# Patient Record
Sex: FEMALE | Race: WHITE
[De-identification: names, ages, dates, MRNs, and addresses within clinical notes are randomized per-mention and may not be internally consistent; named-entity substitution may affect disease eponyms.]

---

## 2017-07-13 NOTE — NUR
CALLED DR. GARLAND AND NOTIFIED HIM OF FENTANYL PATCH ON PT, ALSO CALLED FOR
CLARIFICATION OF 2 MEDS, NEW ORDERS RECEIVED AND TO BE CARRIED OUT.

## 2017-07-13 NOTE — NUR
PT.ASSESSED. PT.AWAKES TO ANSWER QUESTIONS AND IS LOCX3 TO ANSWERS, BUT EYES
IMMEDIATELY CLOSE AND SHE IS ASLEEP.  I FLUSHED,WASTED 10CC AND
LEONOR BLOOD FOR LAB FROM PICC LINE AND FLUSHED AGAIN BOTH LUMEN'S AND PT.WAS
AWAKE AND TALKING TO ME AND FELL ASLEEP DURING AND DIDN'T KNOW I DID IT. B/P
IS 88/46, , 90%02 ON 2L, 98.6T.,R22, CO2 57.8/GENIE OF RESPIRATORY
NOTIFIED  IS HERE AND AWARE OF V/S.   IS ORDERING O2@3L
HUMIDIFIED.

## 2017-07-14 NOTE — NUR
PT ALERT AND ORIENTED X3; SITTING ON SIDE OF BED; DRINKING COFFEE, WATCHING
TV, TALKACTIVE, DENIES PAIN AT THIS TIME; NS INFUSING AT 80 ML/HR WITHOUT
DIFFICULTY, NO SIGNS OF DISTRESS NOTED, SAFETY MEASURES, EXPLAINED PLAN OF
CARE AND MED SCHEDULE, VOICES UNDERSTANDING ENCOURAGED TO CALL IF NEEDED. WILL
CONTINUE TO MONITOR. PM ASSESSMENT COMPLETED AT THIS TIME SEE INTERVENTIONS
FOR DETAILS. CALL KLINE IS AT REACH.

## 2017-07-14 NOTE — NUR
PT JUST COMPLETED A SHOWER. IV SITE FLUSHABLE., NO DISTRESS NOTED. C/O LOOSE
STOOLS. ASSESSMENT IS COMPLETE.

## 2017-07-14 NOTE — NUR
PT. C/O HA 5/10, MEDICATED WITH ORDERED PRN MOTRIN, WILL REASSESS. DENIES
FURTHER NEED. CALL LIGHT IS IN REACH.

## 2017-07-14 NOTE — NUR
PT. RESTING IN BED WITH EYES CLOSED, NO DISTRESS NOTED. RESP EVEN AND
UNLABORED. CALL LIGHT IS IN REACH.

## 2017-07-15 NOTE — NUR
PT APPEARS TO BE SLEEPING WITH EYES CLOSED; AROUSES TO MINIMUM STIMULI; RESP
ARE EVEN AND UNLABORED; VOICES NO COMPLAINTS AT THIS TIME; NS INFUSING AT 80
ML/HR; DOUBLE LUMEN PICC LINE ON LUE IS FREE OF REDNESS OR EDEMA; NO DISTRESS
NOTED; WILL CONTINUE TO MONITOR.

## 2017-07-15 NOTE — NUR
PT RETURNED FROM RADIOLOGY VIA WC. PT UP TO RECLINER CHAIR WITH MINIMAL
ASSISTANCE. WILL CONTINUE TO MONITOR. CALL LIGHT IN REACH.

## 2017-07-15 NOTE — NUR
PT C/O HEADACHE, MEDICATED WITH MOTRIN PER EMAR, WILL CONTINUE TO REASSESS,
CALL BELL IS AT REACH, NO DISTRESS NOTED.

## 2017-07-15 NOTE — NUR
RECEIVED SHIFT REPORT FROM JONG DAILEY. PATIENT LAYING IN BED QUIETLY AND APPEARS
NOT TO BE IN ANY APPARENT ACUTE DISTRESS. NO VOICED COMPLAINTS AT THIS TIME.

## 2017-07-15 NOTE — NUR
REPORT RECIEVED FROM JONG CELAYA. PT SITTING UP IN BED. ASSESSMENT COMPLETED.
VSS. POC DISCUSSED WITH PT. IVF INFUSING AT PRESCRIBED RATE. WILL CONTINUE TO
MONITOR. CALL LIGHT IN REACH.

## 2017-07-16 NOTE — NUR
PATIENT LAYING IN BED WITH EYES CLOSED AND APPEARS TO BE ASLEEP. RESPIRATION
EVEN AND UNLABORED. NO APPARENT ACUTE DISTRESS NOTED.

## 2017-07-16 NOTE — NUR
RECEIVED SHIFT REPORT FROM JONG DAILEY. PATIENT LAYING IN BED AND APPEARS NOT TO
BE IN ANY APPARENT ACUTE DISTRESS OR DISCOMFORT. NO VOICED COMPLAINTS.

## 2017-07-16 NOTE — NUR
DR. GARLAND IN TO SEE PT AT THIS TIME. PLAN OF CARE UPDATED. PT INSTRUCTED ON
CPT, AMBULATING IN HALLWAYS AND REPEAT LABS. PT STATES UNDERSTANDING.

## 2017-07-17 NOTE — NUR
2ND UNIT COMPLETED: IV SITE IS FREE FROM REDNESS OR EDEMA. FAMILY HAS BEEN IN
TO VISIT WITH PT. NO DISTRESS NOTED. CONTINUE TO OSBERVE AND MONITOR.

## 2017-07-17 NOTE — NUR
PATIENT RESTING IN BED AT THIS TIME. C/O GENERALIZED ITCHING AND WAS MEDICATED
WITH BENEDRYL 25MG PO.  PATIENT C/O LEFT KNEE PAIN-10/10 AND WAS MEDICATED
WITH MOTRIN 800MG PO FOR PAIN.  PATIENT MEDICATED FOR ANXIETY AND SLEEP WITH
XANAX 1MG PO AS ORDERED.  SAFETY PRECAUTIONS REINFORCED. CALL LIGHT IN REACH.
WILL CONT TO MONITOR.

## 2017-07-17 NOTE — NUR
PT HELEN TO SIT UP IN THE CHAIR NO DISTRESS NOTED. IV SITE IS FREE FROM REDNESS
OR EDEMA. CONTINUE TO OSBERVE AND MONITOR.

## 2017-07-17 NOTE — NUR
ASSESSMENT IS COMPLETED: IV SITE IS FREE FROM REDNESS OR EDEMA. NEEDING
HEPARIN TO KEEP LINE CLEAR. NO DISTRESS NOTED. CONTINUE TO OBSERVE AND MONTOR.

## 2017-07-17 NOTE — NUR
PATIENT SITTING UP IN THE RECLINER WITH VISITORS AT BESIDE. PATIENT ALERT AND
ORIENTEDX3.  PATIENT WITH DOUBLE LUMEN PICC TO LEFT ARM WITH NS AT KVO RATE.
SITE APPEARS HEALTHY AT THIS TIME.  PATIENT WITH NO O2 AT THIS TIME.  SAFETY
PRECAUTIONS REVIEWED WITH PATIENT. CALL LIGHT IN REACH. WILL CONT TO MONITOR.

## 2017-07-18 NOTE — NUR
DISCHARGE INSTRUCTIONS GIVEN AND PT VERBALIZED UNDERSTANDING. IV SITE FLUSHED
WITH NS AND HEPARIN. CONTINUE TO OSBERVE AND MONITOR.

## 2017-07-18 NOTE — NUR
ASSESSMENT IS COMPLETED : IV SITE IS FREE FROM REDNESS OR EDEMA. ACCU CHECK
PER LAB WAS 64 AND THEN RECHECKED IT WAS 93. CONTINUE TO OSBERVE AND MONITOR.

## 2017-07-18 NOTE — NUR
PATIENT APPEARS SLEEPING AT THIS TIME WITH EYES CLOSED. IVF PATENT AND
INFUSING AT 20CC/HR.  CALL LIGHT IN REACH. WILL CONT TO MONITOR.

## 2017-08-18 NOTE — NUR
OOB TO BSC VOIDING CLEAR YELLOW URINE, THEN BACK TO BED.  ICE PACK APPLIED TO
LEFT KNEE.  CALL LIGHT IN REACH.

## 2017-08-18 NOTE — NUR
BED SIDE REPORT GIVEN BY RICHI FROM PACU.
PT ARRIVED ON UNIT IN BED, ALERT AND ORIENTED X 3, C/O PAIN TO L. KNEE @
12/10, O2 SATS IN LOW 80'S. O2 @ 2L VIA NC PLACED, SETTLED IN ROOM, SURGICAL
DRESSING TO L. KNEE CDI. CALL KLINE IN REACH.

## 2017-08-18 NOTE — NUR
P/O LEFT KNEE ARTHROPLASTY, OOB TO BSC WITH MINIMAL ASSISTANE VOIDING CLEAR
YELLOW URINE, THEN BACK TO BED.  SCD APPLIED TO RIGHT LEG.  C/O PAIN 10/10,
MEDICATED WITH PERCOCET 2TAB AT THIS TIME.  LR INFUSING TO LFA AT 100CC/HR.
ICE PACK TO LEFT KNEE.  ENCOURAGED TO USE CALL LIGHT FOR ASSISTNACE, WILL
CONTINUE TO MONITOR.

## 2017-08-19 NOTE — NUR
PT.MEDICATED FOR PAIN REPORTED 10/10, PT.IS UPRIGHT IN BED STARTING TO EAT
SUPPER TRAY AT THIS TIME; CALL LIGHT W/IN REACH

## 2017-08-19 NOTE — NUR
PATIENT RESTING IN BED AT THIS TIME.  PATIENT C/O SEVERE POST-OP PAIN-10/10.
PATIENT MEDICATED WITH DILAUDID 1MG IVP AS ORDERED FOR SEVERE PAIN.  PATIENT
IS ALERT AND ORIENTEDX3.  PATIENT UP TO THE BSC VOIDING IN SMALL AMTS.
PATIENT WITH DOUBLE LUMEN PICC TO LEFT UPPER ARM.  SITE APPEARS HEALTHY AT
THIS TIME.  GOOD BLOOD RETURN FROM BOTH PORTS.  IVF TO LEFT WRIST AREA AT
100CC/HR.  SITE APPEARS HEALTHY AT THIS TIME.  DRESSING TO LEFT KNEE INTACT
WITH ACE WRAP.  ICE PACK FOR COMFORT.  SAFETY PRECAUTIONS REINFORCED. CALL
LIGHT IN REACH. WILL CONT TO MONITOR.

## 2017-08-19 NOTE — NUR
PT.ASSISTED TO BSC,AMBULATED WELL USING WALKER AND PIVOT METHOD; PT.ASSISTED
BACK TO BED AND REPOSITIONED W/ICE PACKS IN PLACE; PROVIDED GINGER-HELEN AS
REQUESTED OVE ICE

## 2017-08-19 NOTE — NUR
OOB TO BSC WITH MINIMAL ASSISTANCE, VOIDING CLEAR YELLOW URINE.  BACK TO BED.
QUARTER SIZE BLOOD COMMING THROUGH DRESSING.  ICE PACK APPLIED.  WILL CONTINUE
TO MONITOR.

## 2017-08-19 NOTE — NUR
RADIOLOGY AT BEDSIDE FOR CXR TO CONFIRM PICC LINE PLACEMENT. WILL AWAIT FOR
RESULTS. #20 LFA AT THIS TIME REMAINS IN PLACE. CALL LIGHT IN REACH.

## 2017-08-19 NOTE — NUR
Patient was seen and treated today. Patient expressed that the left knee knee
is sore and painful 8/10. Patient also feels sleepy prior to start of
physical therapy session.
 
Physical Therapy Management:
1. Ankle pumps x 15 repetitions
2. Passive stretching of the left knee into full extension  x 5 minutes
3. Bilateral quad-setting x 5 seconds hold x 10 repetitions
4. Bilateral hamstring-setting x 5 seconds hold x 10 repetitions
5. Active range of motion of the right knee
   5.1 Right hip and knee flexion x 15 repetitions
6. Active-assistive range of motion of the left knee
   6.1 Left hip and knee flexion x 15 repetitions
 
Patient education on fall precautions discussed. Patient verbalized
understanding.
 
Patient was able to perform all exercises but was sore throughout physical
therapy session. Patient remained sleepy throughout physical therapy.

## 2017-08-19 NOTE — NUR
PATIENT WITH HS MEDS INCLUDING PERCOCET 10/325MG TABS 2 AND XANAX 1MG PO FOR
SLEEP.  RECIEVED VERBAL COMFIRMATION THAT PICC IS IN THE SVC-IVF HOOKED UP TO
THE PICC LINE AT 100CC/HR AS ORDERED.  IV SITE TO LEFT WRIST D/CARLITOS.  O2 VIA
NASAL CANNULA IN PLACE AT 2LPM.  CALL LIGHT IN REACH. WILL CONT TO MONITOR.

## 2017-08-19 NOTE — NUR
Patient was seen and treated today. Pateint left knee feels sore prior to
start of physical therapy session.
 
1. Ankle pumps x 15 repetitions
2. Bilateral quad setting x 5 seconds hold x 10 repetitions
3. Bilateral hamstring setting x 5 seconds x 10 repetitions
4. Active-assistve range of motion of the left lower extremity
   4.1 Hip and knee flexion x 15 repetitions
   4.2 Hip abduction x 15 repetitions
5. Active range of motion of the right lower extrmity
   5.1 Hip and knee flexion x 15 repetitions
   5.2 Hip abduction x 15 repetitions
6. Supine to sit and sit to supine with minimal assist and verbal cues for
   safety.
7. Sitting balance at the edge of the bed x 3-5 minutes
8. Sit to stand and stand to sit x 3-5 minutes
9. Bed to chair and chair bed transfer 2 repetitions
10. Gait training with rolling walker with minimal assist and with verbal cues
    for safety x 3-5 feet.
 
James was able to perform all therapeutic exercises but felt very tired after
physical therapy session. Fall precautions reviewed. Patient verbalized
understanding.

## 2017-08-19 NOTE — NUR
PT SITTING UP IN BED. REPORT RECIEVED FROM PATRIC SANDOVAL LPN. ASSESSMENT
COMPLETED. VSS. PT C/O LEFT KNEE PAIN. MEDICATED AT THIS TIME. I.S. AT BEDSIDE
AND PT ABLE TO DEMONSTRATE TO WRITER. POC DISCUSSED. ICE PACK TO LEFT KNEE.
WILL CONTINUE TO MONITOR. CALL LIGHT IN REACH.

## 2017-08-19 NOTE — NUR
PT MEDICATED FOR PAIN AT THIS TIME. FAMILY AT BEDSIDE. PT DENIES ANY NEEDS AT
THIS TIME. WILL CONTINUE TO MONITOR. CALL LIGHT IN REACH.

## 2017-08-20 NOTE — NUR
PATIENT UP IN CHAIR WITH MAX A OF 2 PER NSG.  HER BREAKFAST IS STILL IN FRONT
OF HER, WHILE SHE SLEEPS IN THE CHAIR.  WAITED 25 MIN FOR HER TO EAT AND UPON
RETURN SHE HAD NOT MOVED AND WAS STILL SLEEPING.  INCREASED VERBAL STIM TO
MAINTAIN EYES OPEN.  REC'D FUNCTIONAL ACTIVITY/GAIT TRAINING FOR SIT TO STAND
AND AMB WITH RW.  MOD A OF 2 SIT TO STAND. PATIENT UNABLE TO STAND ERECT. HAD
CNA FOLLOW WITH RECLINER DUE TO POOR SAFETY AWARENESS AND ALERTNESS.
INSTRUCTED TO MAINTAIN FOOT BETWEEN HANDS INSIDE OF WALKER. PATIENT DOES NOT
MOVE RIGHT LE AS NOT TO WB ON LEFT.  SHE FELT CHAIR BEHIND HER AND SAT WITH
ASSISTANCE, ABRUPTLY.  PATIENT WAS NOT ABLE TO FOLLOW DIRECTIONS FOR ADVANCING
R LE, USING UE WB'G, OR EVEN TO SLIDE R FOOT FORWARD.  THE PROPER SEQUENCE AND
TECHNIQUE WAS DEMONSTRATED FOR PATIENT TO PROVIDE LESS PRESSURE ON L LE AND
MAINTAIN GOOD BASE OF SUPPORT IN WALKER.  SHE VERBALIZES GOOD UNDERSTANDING OF
SAME.  SHE REQUESTS HER OATMEAL BE HEATED AND IS ALERT ENOUGH NOW TO EAT. WILL
AMB MORE AT P.M. TX LATER TODAY. AAROM IN SITTING AND PATIENT NOT RECLINED IN
ORDER TO STAY AWAKE AND EAT.

## 2017-08-20 NOTE — NUR
REASSESSED O2 LEVELS @90% ON 02@2L, PT.IS UPRIGHT IN RECLINER W/ICE PACK IN
PLACE POPLATEAL OF KNEE, PT.SLEEPING THROUGH SAT CHECK, BREAKFAST IS IN FRONT
OF HER AND I ALREADY AWOKE HER AND REMINDED HER OF HER BREAKFAST, SHE FELL
BACK TO SLEEP

## 2017-08-20 NOTE — NUR
PATIENT STATES NO RELIEF FROM DILAUDID-STILLL WITH PAIN 10/10 ON  PAIN SCALE.
PATIENT MEDICATED WITH PERCOCET 2TABS AS ORDERED.  PATIENT SEEMS TO BE DOZING
OFF INTERMOITANTLY BUT STILL C/O SEVERE PAIN.  PATIENT WITH TREMORS OF HANDS
WHEN HANDED HER WATER TO TAKE HER PILLS.  PATIENT WITH O2 VIA NASAL CANNULA IN
PLACE.  SCD'S IN PLACE.  ICE PACK TO LEFT KNEE.  DRESSING REMAINS CLEAN DRY
AND INTACT WITH ACE WRAP TO SECURE. SAFETY PRECAUTIONS REINFORCED.  CALL LIGHT
IN REACH. WILL CONT TO MONITOR.

## 2017-08-20 NOTE — NUR
PATIENT APPEARS SLEEPING AT THIS TIME IN NO ACUTE DISTRESS WITH O2 VIA NASAL
CANNULA IN PLACE.  CALL LIGHT IN REACH. WILL CONT TO MONITOR.

## 2017-08-20 NOTE — NUR
PATIENT RESTING IN BED AT THIS TIME.  AWAKE ALERT AND ORIENTEDX3.  PATIENT
WITH DRESSING TO LEFT KNEE INTACT WITH ACE WRAP.  SOME BRUISING NOTEDED BELOW
THE KNEE AND SEVERAL SMALL FLUID FILLED BLISTERS NOTED TO THE LEFT HIP/THIGH
AREA-LEFT NAEL.  PATIENT WITH DOUBLE LUMEN PICC TO LEFT UPPER ARM-SITE APPEARS
HEALTHY AT THIS TIME.  SCD IN USE TO RIGHT LE.  USING ICE TO SURGICAL SITE AS
ORDERED FOR COMFORT AND SWELLING.  ENCOURAGED CDB EXERCISE AND USE OF IS Q1H
WHILE AWAKE X10 REPS-DEMONSTRATES ABILITY TO PREFORM.  SAFETY PRECAUTIONS
REINFORCED. CALL LIGHT IN REACH. WILL CONT TO MONITOR.

## 2017-08-20 NOTE — NUR
PT.UP TO BSC, HR ELEVATED AND O2 SATS DROPPED TO 84%, PT.REPLACED ON O2, UP TO
 PT.PROVIDED GARRETT CARE AND REPOSITIONED IN RECLINER FOR BREAKFAST.HR BACK
DOWN AND O2 SATS AT 91%

## 2017-08-20 NOTE — NUR
PATIENT RESTING IN BED AT THIS TIME-C/O SEVERE POST-OP AND BACK PAIN-MEDICATED
WITH DILAUDID 1MG IVP.  PATIENT IS ASKING FOR PERCOCET AS WELL-EXPLAINED TO
THE PATIENT TO SEE IF THE DILAUDID WORKS FIRST-EDUCATED ON THE POSSIBLE SIDE
EFFECTS OF NARCOTICS.  SAFETY PRECAUTIONS REINFORCED.  CALL LIGHT IN REACH.
WILL CONT TO MONITOR,

## 2017-08-20 NOTE — NUR
PATIENT ASSISTED TO THE BSC TO VOID AND BACK TO BED.  PATIENT C/O POST-OP AND
BACK PAIN-10/10.  MEDICATED WITH DILAUDID 1MG AS ORDERED.  O2 VIA NASAL
CANNULA IN PLACE. CALL LIGHT IN REACH. WILL CONT TO MONITOR.

## 2017-08-20 NOTE — NUR
PATIENT PERFORMED SIT TO STAND TRANSFER AT BEDSIDE X 4 WITH MAX ASSIST FROM
THERAPIST. SHE WAS ABLE TO STAND PIVOT TO BEDSIDE COMMODE POSITIONED
PERPENDICULAR TO THE FOOT OF THE BED WITH MAX ASSIST AND MAX VERBAL CUES. SHE
WAS HAVING DIFFICULTY MOVING HER FEET TO COMPLETE THE STAND PIVOT MANEUVER.
PATIENT SAFELY SAT ON THE COMMODE AND TRANSFERRED BACK TO BED IN SHORT SITTING
POSITION. FINALLY, SHE WALKED SIDEWAYS TOWARDS THE HEAD OF BED IN TINY LITTLE
STEPS WITH MAX ASSIST FROM THERAPIST. SHE HAS GREAT DIFFICULTY STANDING USING
THE ROLLING WALKER TODAY. HER PAIN LEVEL WAS STATED AS 40/1O. SHE WAS UNABLE
TO TRANSFER AND WALK TO THE RECLINER. THERAPIST GAVE MAX ASSIST FOR SIT TO
SUPINE TRANSFER AND PATIENT SAFELY GOT INTO SUPINE POSITION.

## 2017-08-21 NOTE — NUR
Pt seen this pm for gait. She moved supine to and from sit with SBA. Gait with
RW 2 x15' with CGA, gait belt and non skid socks in place. Pt returned to bed
with call bell and phone in reach. Pt required cueing for safety skills.

## 2017-08-21 NOTE — NUR
PATIENT ASSISTED OOB TO BS TO VOID YELLOW URINE AND THE ASSISTED BACK TO BED.
PATIENT MEDICATED WITH PERCOCET 10/325MG PO FOR PAIN WITH RESTORIL 30 MG PO
FOR SLEEP.  PATIENT CONT TO BE UNHAPPY WITH CURRENT PAIN MANAGEMENT PLAN-WANTS
MORE PAIN MEDS.  PATIENT DOESN'T SEEM TO BE IN ANY SEVERE DISTRESS AT THIS
TIME EVEN THOUGH PATIENT STATES THAT HER PAIN LEVEL IS 10/10 ON PAIN SCALE.
ATTEMPT TO EDUCATE PATIENT REGUARDING POSSIBLE COMPLICATION OF EXCESSIVE USE
OF NARCOTIC WITH LITTLE SUCCESS.  SAFETY PRECAUTIONS REINFORCED. CALL LIGHT IN
REACH. WILL CONT TO MONITOR.

## 2017-08-21 NOTE — NUR
SITTING UP IN RECLINER AT THIS TIME CONVERSING VIA PHONE, UNHAPPY SHE IS NO
LONGER HAVING DILAUDID FOR PAIN AND STATES HER PAIN STAYS AT 10/10 OR SLIGHTLY
IMPROVED BY 1 POINT, NEEDS ADDRESSED, CALL BELL IN REACH.

## 2017-08-21 NOTE — NUR
PATIENT RESTING IN BED AT THIS TIME APPEARS SLEEPING WITH O2 VIA NASAL CANNULA
IN PLACE.  CALL LIGHT IN REACH. WILL CONT TO MONITOR.

## 2017-08-21 NOTE — NUR
PATIENT RESTING IN BED-ASSISTED TO BSC TO VOID.  PATIENT IS MOVING BETTER WITH
HER WALKER.  ASSISTED BACK TO BED.  PATIENT WITH PICC TO LEFT UPPER ARM-SITE
APPEARS HEALTHY AT THIS TIME.  DRESSING TO LEFT KNEE INTACT WITH ACE WRAP-SOME
SWELLING NOTED TO LLE.  SAFETY PRECAUTIONS REINFORCED. CALL LIGHT IN REACH.
WILL CONT TO MONITOR.

## 2017-08-21 NOTE — NUR
PATIENT ASSISTED TO BSC TO VOID CLEAR YELLOW URINE.  ASSISTED BACK TO BED.
PATIENT C/O SEVERE POST-OP AND BACK PAIN-10/10 ON PAIN SCALE.  PATIENT
MEDICATED WITH DILAUDID 1MG IVP.  MORNING LABS DRAWN FROM LEFT UPPER ARM PICC.
SAFETY PRECAUTIONS REINFORCED.  CALL LIGHT IN REACH.  WILL CONT TO MONITOR.

## 2017-08-21 NOTE — NUR
SHIFT CHANGE REPORT FROM RAI WAHL SLEEPING AT THIS TIME, BREATHING EVEN AND
NON-LABORED, NO SIGN DISCOMFORT AT THIS TIME, CALL BELL IN REACH.

## 2017-08-22 NOTE — NUR
SHIFT CHANGE REPORT FROM RAI WAHL SLEEPING COMFORTABLY, BREATHING EVEN AND
NON-LABORED, NO SIGN DISCOMFORT, CALL BELL IN REACH.

## 2017-08-22 NOTE — NUR
REPORT CALLED TO MICHELLE AT [facility], QUESTIONED ABOUT SCRIPTS FOR
NARCOTICS AND INFORMED MD SENT ONLY 2 SCRIPTS AND STATED PAIN MANAGEMENT
DOCTOR SHOULD ADDRESS OTHER NARCOTICS PT IS TAKING. INFORMED DR ANTONY IS
HER PAIN MANAGEMENT MD AND GIVEN CONTACT NUMBER.

## 2017-08-22 NOTE — NUR
IN BED HAVING MEAL AT THIS TIME, REQUESTED ASSIST TO BSC AND BACK TO BED TO
COMTINUE EATING, REFUSED TO SIT UP IN RECLINER FOR MEAL, CALL BELL IN REACH.

## 2017-08-22 NOTE — NUR
PATIENT APPEARS SLEEPING AT THIS TIME WITH O2 VIA NASAL CANNULA IN PLACE.
CALL LIGHT IN REACH. WILL CONT TOMONITOR.

## 2017-08-22 NOTE — NUR
REPORTS PAIN LEVEL @ 10/10 ALWAYS, ASSISTED TO BSC AND BACK TO BED, INFORMED
OF POST-OP SWELLING WHICH WAS CONCERNING THAT THIS IS A COMMON OCCURANCE AND
WILL RECEDE EVENTUALLY, CALL BELL IN REACH.

## 2017-08-22 NOTE — NUR
Discharge instructions given. Patient verbalizes understanding of same.
[facility] with
*Other. All belongings sent with pt.

## 2017-08-22 NOTE — NUR
PATIENT PERFORMED TOILET TRANSFER TODAY TO THE BEDSIDE COMMODE WITH MIN A AND
GAIT BELT IN PLACE. SHE AMBULATED WITH ROLLING WALKER
ABOUT 30 FEET X 2 OUT INTO THE HALLWAY WITH
CGA. SHE WAS ABLE TO GET BACK INTO THE RECLINER USING THE ROLLING WALKER AND
GAIT BELT. NO UNTOWARD INCIDENT HAPPENED.

## 2017-08-22 NOTE — NUR
Pt. seen this PM for gait training, gait belt and non skid socks applied. Pt.
found sitting at edge of bed and in agreement to participate. Sit to stand
with min. assist of 1. Pt. ambulated into hallway 1x45 feet using RW with CGA
of 1, pt. ambulated with short step on left. Verbal cues for posture
awareness. Pt. returned to resting in bed with mod. assist of 1 to bring left
LE up to bed. Pt. without complaints, call bell and bedside table left within
reach.

## 2017-08-22 NOTE — NUR
PATIENT UP TO BSC TO VOID AND THEN BACK TO BED.  PATIENT MEDICATED FOR 10/10
POST-OP PAIN WITH PERCOCET 10/325MG PO.  BLOOD DRAWN FOR AM LABS FROM PICC
LINE WITHOUT ANY DIFFICULTY.  CALL LIGHT IN REACH.  WILL CONT TO MONITOR.

## 2018-07-13 NOTE — NUR
RECEIVED PT AS A DIRECT ADMIT FROM DR. GARLAND'S OFFICE. VITALS SIGNS TAKEN. 02
VIA NASAL CANULA AT 3 LITERS WITH HUMIDIFER. ROOM ARRANGEMENT DISCUSSED. CALL
LIGHT EXPLAINATION GIVEN. NO RESP. DISTRESS NOTED. NO COMPLAINTS VOICED. PLAN
OF CARE DISCUSSED WITH PATIENT.

## 2018-07-13 NOTE — NUR
PT.ASSESSED. LUNG SOUNDS ARE CLEAR, ABD SOFT/NON-TENDER, NO NOTED EDEMA, SKIN
AND NEURO'S ARE INTACT. PT.DENIES ANY SOB AT THIS TIME. NO S/S OF DISTRESS
NOTED. CALL LIGHT W/IN REACH AND PT ENCOURAGED TO CALL IF ANY NEEDS ARISE.
JOSE IS UP FROM ED TO PLACE IV FOR PT.

## 2018-07-14 NOTE — NUR
MEDICATED PT W/ANTIBIOTIC IV THERAPY AND OTHER PM MEDICATIONS AS ORDERS
PROVIDE. PT.IS REQUESTING PAIN MEDICATION FOR "UNCOMFORTABLENESS" IN HER BACK.
I AM AWAITING PHARMACY TO VERIFY ORDER AND WILL MEDICATE AS SOON AS I CAN.
PT.DENIES ANY SOB AND REPORTS NON-PRODUCTIVE COUGH. UA HAS BEEN COLLECTED AND
SENT TO LAB. PT.AMBULATED TO [facility] W/OUT DIFFICULTY.

## 2018-07-14 NOTE — NUR
PT RESTING IN BED WITH EYES CLOSED, OFFERS NO COMPLAINTS AT THIS TIME. CALL
LIGHT IN REACH, WILL MONITOR.

## 2018-07-14 NOTE — NUR
PT IN [place] WATCHING TV, RESPIRATIONS EVEN AND UNLABORED ON O2 @3L VIA
NC.  DENIES PAIN AT THIS TIME, ADMITS TO PAIN TO RIGHT KNEE WHEN AMBULATING,
AND ADMITS TO RIGHT KNEE REPLACEMENT SCHEDULED IN ONE WEEK.  D5NS INFUSING TO
RFA AT 100CC/HR.  PO FLUIDS IN REACH, BSC AT BED SIDE.  ENCOURAGED TO USE CALL
LIGHT FOR ASSISTANCE.  WILL CONTINUE TO MONITOR.

## 2018-07-14 NOTE — NUR
OOB TO BSC WITH STANDBY ASSISTANCE, VOIDING 150ML CLEAR YELLOW URINE.  BACK TO
BED.  BIPAP APPLIED BY LYNDSEY HDEZ AT THIS TIME.  PT ASKING ABOUT TRAZODONE
STATES SHE USUALLY TAKE 200MG AT BED TIME, NOTIFIED TRAZODONE IS NOT ON MAR
BUT WILL CALL DR. GARLAND, DR. GARLAND STATES MAY HAVE SONATA TONIGHT AND WILL
SPEAK TO PT REGARDING TRAZODONE IN THE MORNING.

## 2018-07-14 NOTE — NUR
PT ARRIVED TO ICU BED# VIA W/C. PT ALERT AND ABLE TO MAKE ALL NEEDS KNOWN.
PERRL, O2@3LPM VIA NC, LS CLEAR THROUGHOUT. BSX4 ACTIVE, PT REPORTS PASSING
GAS. PT DENIES BURNING OR DISCOMFORT WITH URINATION. PT SKIN IS CDI. PT WITH
22G TO LAC SL, FLUSHES WITHOUT DIFFICULTY. 22 G TO RFA WITH INFUSING
AS ORDERED. PT DENIES SOB, CHEST PAIN OR DISTRESS AT THIS TIME. PT ORIENTED TO
ROOM, UNIT, CALL LIGHT AND SAFETY. BED IN LOWEST POSITION, CALL LIGHT IN
REACH. WILL MONITOR.

## 2018-07-14 NOTE — NUR
PT RESTING IN BED, SR ON TELEMETRY, IV INFUSING AS ORDERED TO RFA, NO S/S OF
INFILTRATION NOTED AT THIS TIME. O2@3LPM VIA NC, CALL LIGHT IN REACH, WILL
MONITOR.

## 2018-07-14 NOTE — NUR
PT ASSISTED TO BSC, VOIDED WITHOUT DIFFICULTY. PT DENIES SOB, CHEST PAIN OR
DISTRESS AT THIS TIME. PT REMAINS SR ON TELEMETRY. IV INFUSING AS ORDERED AT
[facility], NO S/S OF INFILTRATION NOTED AT SITE. PT STATED SHE WAS FEELING A
LITTLE NAUSEA, CRACKERS OFFERED AND TAKEN WITH SOME RESOLUTION. BED IN LOWEST
POSITION, CALL LIGHT IN REACH, WILL MONITOR.

## 2018-07-14 NOTE — NUR
PT IN [place] WATCHING TV, RESPIRATIONS EVEN AND UNLABORED ON O2 @3L VIA
NC.  DENIES PAIN AT THIS TIME, ADMITS TO PAIN TO RIGHT KNEE WHEN AMBULATING,
AND ADMITS TO RIGHT KNEE REPLACEMENT SCHEDULED IN ONE WEEK.  D5 1/2NS INFUSING
TO RFA AT 100CC/HR.  PO FLUIDS IN REACH, BSC AT BED SIDE.  ENCOURAGED TO USE
CALL LIGHT FOR ASSISTANCE, WILL CONTIUE TO MONITOR.

## 2018-07-15 NOTE — NUR
PT SEEN RESTING IN THE BED, NO DISTRESS.  HER BP DOES LOWER WITH DEEP SLEEP,
NO EVIDENCE OF DISTRESS OTHERWISE.

## 2018-07-15 NOTE — NUR
PT IN BED A/O X3, WATCHING TV, RESPIRATIONS EVEN AND UNLABORED ON O2 @2L VIA
NC, O2 SAT 98%.  C/O BACK AND RIGHT KNEE PAIN 5/10, ROXICODONE PROVIDED PER
MAR.  IV TO RFA IS HL, FLUSHES WELL.  PO FLUIDS IN REACH, WILL CONTINUE TO
MONITOR.

## 2018-07-15 NOTE — NUR
PT TO XRAY AND BACK VIA WHEELCHAIR.  DR GARLAND HAS SEEN PT, TO DC IVF AND KEEP
OVERNIGHT IN ICU ONE MORE NIGHT TO USE CPAP AGAIN.  LUNGS CLEAR, 2 LPM NC.

## 2018-07-15 NOTE — NUR
OOB TO BSC, VOIDING CLEAR YELLOW URINE, THEN BACK TO BED WITH NO ASSISTANCE.
LYNDSEY RT, PLACED PT OF BIPAP AT THIS TIME, O2 SAT 97%.

## 2018-07-15 NOTE — NUR
OOB TO BSC WITH ONE PERSON ASSISTANCE, VOIDING 150ML OF CLEAR YELLOW URINE,
THEN BACK TO BED, BIPAP IN PLACE, O2 SAT 98%.  CALL LIGHT IN REACH.

## 2018-07-15 NOTE — NUR
PT CALLED STAFF TO ROOM, STATES "I DON'T LIKE THIS" REFERING TO BIPAP, AND
REQUESTING TO KEEP IT OFF.  O2 @3L VIA NC APPLIED.  O2 SAT 94%

## 2018-07-16 NOTE — NUR
BIPAP REMOVED BY RT AT THIS TIME, PT VOICES HOW UNCOMFORTABLE SHE WAS DURING
THE NIGHT AND COULDN'T SLEEP WELL WITH BIPAP.  O2 @2L VIA NC APPLIED O2 SATS
96%, CALL LIGHT IN REACH.

## 2018-07-16 NOTE — NUR
PT SITTING UP IN BED WATCHING TV, PT VERBALIZES NO COMPLAINTS, PT A & O X3,
PERRL, HR 76, RESP. 20, BP 81/40, O2 95% ON 2L VIA NC, LUNG SOUNDS CLEAR IN
ALL FIELDS, STRONG RADIAL PULSES, WEAK PEDAL PULSES, ABD DISTENDED & SOFT,
ACTIVE BOWEL SOUNDS, 22G RFA IV, SALINE LOCKED, NO REDNESS OR DRAINAGE AT
SITE, AM ASSESSMENT COMPLETE, SEE INTERVENTIONS, SAFETY MEASURES REINFORCED,
CALL BELL WITHIN REACH

## 2018-07-16 NOTE — NUR
CALL PLACED TO DR GARLAND'S OFFICE, SPOKE WITH JULIANN THE SCHEDULE APPOINTMENTS
THE FOLLOW APPOINTMENTS WERE GIVEN
1) ANTONINO SCAN STRESS TEST 7/24/18 @ 1030
2) ECHO 7/26/18 @ 1230
3)FOLLOW UP WITH DR GARLAND 8/1/18 @ 0089

## 2018-07-16 NOTE — NUR
PT ASSISTED OUT OF BED TO THE RECLINER, PT AMBULATED WITH A SLOW STEADY GAIT,
REMINDED TO CALL FOR ASSISTANCE, CALL BELL WITHIN REACH

## 2018-10-08 NOTE — NUR
MEDICATED PT WITH DILAUDID FOR RIGHT KNEE PAIN 10/10. PT P- 81 AND O2 94% AT
2L/MIN. PT DENIES ANY OTHER NEEDS AT THIS TIME.  IN ROOM. CALL LIGHT IN
REACH.

## 2018-10-08 NOTE — NUR
HEATHER CAME FROM [place] VIA BED BY NIGHAT. PT IS SHAKING AND STATED IS DUE TO
SHE HAS PAIN ON RIGHT KNEE 10/10. PT IS A&O X3.  NIGHAT STATED THAT THEY
MEDICATED HER WITH PAIN MEDICATIONS LESS THAN AN HOUR AGO. ENCOURAGE PT TO
TAKE DEEP BREATHS. PT VERBALIZED UNDERSTADING.  RIGHT KNEE DRESSING IS CDI.
RIGHT LEG IS WARM TO THE TOUCH WITH PULSE.  ICE PACK APPLIED TO RIGHT KNEE.
SCD IN PLACE.  02 AT 2L/MIN VIA NC. PO FLUIDS AND ICE CHIPS PROVIDED.  SAFETY
PRECAUTIONS REINFROCED AND CALL LIGHT IN REACH.

## 2018-10-08 NOTE — NUR
MEDICATED PT WITH XNANX. ENCOURAGE PT TO USE INCENTIVE SPIROMETRY PT
VERBALIZED UNDERSTANDING. INTERNAL JUGULAR/LR 100ML/HR INFUSING WELL. CALL
LIGHT IN REACH.

## 2018-10-08 NOTE — NUR
PT IS MOANING AND STATED THAT HER PAIN IS 10/10 IN HER RIGHT KNEE. MEDICATED
PT WITH PERCOCET  SEE EMAR. REMOVED FENTAYLE 100MCG PATCH. PT STATED THAT
PATCH IS 4 DAYS OLD.  PT DENIES ANY OTHER NEEDS AT THIS TIME. CALL LIGHT IN
REACH.

## 2018-10-08 NOTE — NUR
PATIENT RESTING IN BED-DROWSY BUT STILL COMPLAINING OF SEVERE POST-OP PAIN TO
RIGHT KNEE.  IV SITE TO RIGHT IJ-TLC WITH IVF LR PATENT AND INFUSING AT
100CC/HR.  SITE APPEARS HELATHY AT THIS TIME.  RIGHT KNEE WITH DRESSING CDI
AND SECURED WITH ACE WRAP. ICE PACKS ON AND OFF AS ORDERED TO RIGHT KNEE.
ENCOURAGED USE OF IS Q1H WHILE AWAKE-NEEDS REINFORCEMENT.  SCD IN PLACE.  O2
VIA NASAL CANNULA IN PLACE AT 2LPM.  ENCOURAGED CDB EXERCISE-PATIENT DROWSY
AND NEEDS REINFORCEMENT SAFETY PRECAUTIONS REINFORCED.CALL LIGHT IN REACH.
WILL CONT TO MONITOR.

## 2018-10-09 NOTE — NUR
PATIENT MEDICATED FOR PAIN WITH PERCOCET 10/325MG PO TABS  WITH NO RELIEF.
STATES RIGHT KNEE PAIN IS 10/10 ON PAIN SCALE.  MEDICATED WITH DILAUDID 1MG
IVP FOR PAIN.,   RESTING IN BED.  DRESSING TO RIGHT KNEE INTACT AND SECURED
WITH ACE WRAP.USING ICE PACKS TO RIGHT KNEE THROUGHOUT THE NIGHT ON AND OFF.
IVF PATENT AND INFUSING VIA RIGHT IJ TLC-LR /HR.  STILL ONLY TAKING SIPS
OF PO FLUIDS. SAFETY PRECAUTIONS REINFORCED.  CALL LIGHT IN REACH. WILL CONT
TO MONITOR.

## 2018-10-09 NOTE — NUR
PATIENT RESTING IN BED AT THIS TIME-STATES THAT SHE IS HAVING SEVERE POST-OP
PAIN TO RIGHT KNEE-USING ICE PACKS ON AND OFF.  TOO EARLY FOR PERCOCET OR
DILAUDID-WAS JUST MEDICATED LESS THAN 2HOURS AGO.  PICC LINE TO LEFT UPPER ARM
INTACT.  PATIENT STATES THAT IT TOO PAINFUL TO GET OOB AND CONT TO USE BEDPAN
TO VOID.  ENCOURAGED INCREASED ACTIVITY UTILAZATION OF PHYSICAL THERAPY TO
PREVENT POST-OP COMPLICATIONS. ASKING FOR XANAX AND MEDICATED AS ORDERED.
ENCOURAGED USE OF IS-MUST BE REINFORCED TO USE.  SAFETY PRECAUTIONS
REINFORCED. CALL LIGHT IN REACH. WILL CONT TO MONITOR.

## 2018-10-09 NOTE — NUR
MEDICATED PT WITH PERCOCET 2 COMBO FOR PAIN IN RIGHT KNEE AND XANAX SEE EMAR.
PT IS SITTING IN RECLINER. X2 PERSON ASSIST PT TO BSC BUT PT STATED
UNABLE TO GET TO BSC. PT STATED WANTS TO GO BACK TO BED.  ASSISTED PT TO THE
BED.  PT DENIES ANY OTHER NEEDS AT THIS TIME. CALL LIGHT IN REACH.

## 2018-10-09 NOTE — NUR
LAB WORK DRAWN FROM RIGHT IJ-GOOD BLOOD RETURN.  FLUSHED PER PROTOCOL.  IVF LR
PATENT AND INFUSING AT 100CC/HR.  PATIENT IS MORE ALERT THIS MORNING. NOT AS
DROWSY AS LAST NIGHT.  RIGHT KNEE DRESSING REMAINS CDI AND SECURED WITH ACE
WRAP. ICE PACK TO THE RIGHT KNEE THROUGHOUT THE NIGHT.  SCD IN PLACE.
ENCOURAGED USE OF IS Q1H WHILE AWAKE. ABLE TO DEMONSTRATE PROPER USE OF THE
DEVICE.  O2 VIA NASAL CANNULA IN PLACE AT 2LPM.  SAFETY PRECAUTIONS
REINFORCED.  CALL LIGHT IN REACH. WILL CONT TO MONITOR.

## 2018-10-09 NOTE — NUR
Pt seen this am for treatment. She was in bed reported being sore but did not
give a pain level. Pt performed A/AAROM to R knee in supine and sitting.
Sitting knee ext approx -30 degrees. Supine to sit with SBA, sitting balance
on edge of bed was good. Pt attempted to stand with walker but was unable to.
Stood x2 with mod assist x1 and was able to maintain for 30 sec or so. She
performed stand pivot transfer with mod assist x 1 therapist. Some buckling of
knees were noted. Pt positioned in chair with legs elevated and heels off
loaded. Pt very concerned about IV in neck, nursing notified. Pt in chair with
phone and call bell in reach. Non skid socks and gait belt used during
treatment.

## 2018-10-09 NOTE — NUR
PT IS SLEEPING IN BED WITH NO S/S OF DISTRESS NOTED. SAMIR PICC APPEAR HEALTHY.
CALL LIGHT IN REACH.

## 2018-10-09 NOTE — NUR
REPORT RECEIVED BY CHARLIE. PT IS RESTING IN BED WITH NO S/S OF DISTRESS
NOTED. PT DENIES NEEDS AT THIS TIME. CALL LIGHT IN REACH.

## 2018-10-09 NOTE — NUR
PATIENT RESTING IN BED-C/O RIGHT KNEE PAIN. PATIENT MEDICATED WITH DILAUDID
1MG IVP PER PATIENT REQUEST.  IV SITE TO RIGHT IJ INTACT WITH IVF LR PATENT
AND INFUSING AT 100CC/HR.  SITE APPEARS HEALTHY AT THIS TIME.  VOIDING ON
BEDPAN THROUGHOUT THE NIGHT.  SAFETY PRECAUTIONS REINFORCED. CALL LIGHT IN
REACH. WILL CONT TO MONITOR.

## 2018-10-09 NOTE — NUR
ALEJANDRO CAME AND STATED THAT HE WILL TAKE R INTERNAL JUGULAR TOMORROW. STATED
THAT HE RECEIVED ORDERS FROM DR. GARLAND FOR A PICC LINE FOR PT.

## 2018-10-09 NOTE — NUR
MEDICATED PT WITH PRECOCET 2 COMBO  FOR PAIN 10/10 IN RIGHT KNEE SEE EMAR. ICE
PACK APPLIED TO RIGHT KNEE. PRUNE JUICE GIVEN. CALL LIGHT IN REACH.

## 2018-10-09 NOTE — NUR
ASSESSMENT DONE. RESPS ARE SHALLOW. PT IS A&O X3. MEDICATED PT WITH DILAUDID
SEE EMAR. RIGHT KNEE DCDI AND ICE PACK APPLIED. SCD IN PLACES. ENCOURAGE PT
TO USE INCENTIVE SPIROMETRY. PT VERBALIZED UNDERSTANDING. LR 100ML/HR INFUSING
WELL.  SAFETY PRECAUTIONS REINFORCED AND CALL LIGHT IN REACH.

## 2018-10-09 NOTE — NUR
PM. TX WAS ATTEMPTED, HOWEVER, PT FIRMLY REFUSED STATING SHE WAS IN "A LOT OF
PAIN" AT THE MOMENT AND THAT SHE ONLY WANTED TO TAKE SOME REST. ADVISED PT TO
DO THE EXERCISES AS TAUGHT BY THERAPIST WHEN SHE FEELS BETTER.

## 2018-10-09 NOTE — NUR
PATIENT RESTING IN BED WITH O2 VIA NASAL CANNULA IN PLACE.  C/O POST-OP RIGHT
KNEE PAIN-10/10.  MEDICATED WITH DILAUDID 1MG IVP FOR PAIN.  CALL LIGHT IN
REACH. WILL CONT TO MONITOR,

## 2018-10-10 NOTE — NUR
PT REQUESTING PAIN MEDS AT THIS TIME. PT STATES "SHE IS NOT GETTING ENOUGH
MEDS BECAUSE ITS NOT THE IV ONE". CORNELL SUNSHINE IN TO SPEAK WITH PT.

## 2018-10-10 NOTE — NUR
PT FALLING ASLEEP WHILE TRYING TO EAT SUPPER. AWOKEN WITH VERBAL STIMULI.
DRESSING TO R KNEE REMOVED. INCISION WELL APPROXIMATED, NO DRAINAGE, NO S/S OF
INFECTION, DERMA BOND INTACT. 1 CM FLUID FILLED BLISTER NOTED TO RIGHT LATERAL
LOWER KNEE AREA. INCISION LEFT OPEN TO AIR PER ORDER. PT TOLERATED WELL.
PT EDUCATED REGARDING ABOVE INFO.

## 2018-10-10 NOTE — NUR
PATIENT CALLED REQUESTING PAIN MEDS AGAIN.  ENCOURAGED  UISE OF PERCOCET BUT
PATIENT STATES THAT HER PAIN IS 10/10 AND SHE NEEDS THE DILAUDID-MEDICATED AS
ORDERED.  CONT TO USE ICE PACKS TO RIGHT KNEE ON AND OFF THROUGHOUT THE NIGHT.
SCD'S IN PLACE.  ENCOURAGED USE OF IS.  CALL LIGHT IN REACH. WILL CONT TO
MONITOR.

## 2018-10-10 NOTE — NUR
Pt seen this am for ther ex and gait training. AROM/AAROM ex preformed in
sitting and supine. Pt moved supine to sit with SBA of RLE. Sitting on edge of
bed without LOB noted. Pt moved sit to stand with min assist and was able to
ambulate 8' to  commode with CGA/min assist. Pt ambulated to chair x 8' with
same assist. She was left in room with CNA preparing to do am care. Pt was
able to flex knee approx 80 degrees.

## 2018-10-10 NOTE — NUR
PATIENT RESTING IN BED-CONT TO USE BEDPAN TO URINATE.C/O SEVERE POST-OP RIGHT
KNEE PAIN-10/10 ON PAIN SCALE.  MEDICATED WITH PERCOCET 10/325MG PO TABS 2.
CONT TO USE ICE TO RIGHT KNEE ON AND OFF THROUGHOUT THE NIGHT.  CALL LIGHT IN
REACH. WILL CONT. TO MONITOR.

## 2018-10-10 NOTE — NUR
PT GIVEN PAIN MEDS PER ORDER. OTHER DAILY MEDS GIVEN AT THIS TIME. PT STATES
PAIN IS AN 8. CALL KLINE IN REACH. WILL CONTINUE TO MONITOR.

## 2018-10-10 NOTE — NUR
PT UP TO CHAIR TO FOR LUNCH. PT FALLING ASLEEP WITH CHICKEN IN HER HAND. PT
AWOKEN TO VERBAL STIMULI. PT MEDICATED PER ORDER. CALL BELL IN REACH. WILL
CONTINUE TO MONITOR.

## 2018-10-10 NOTE — NUR
PT REQUESTING PAIN MEDS AT THIS TIME. MEDICATED PER ORDER. PT BACK IN BED.
CALL BELL IN REACH. WILL CONTINUE TO MONITOR.

## 2018-10-10 NOTE — NUR
PT CALLED ASKING FOR PAIN MEDICATION EDUCATED REGARDING D/C OF IV DILAUDID
THIS AM (PT HAS BEEN TOLD THIS MULTIPLE TIMES THIS SHIFT) ALSO EDUCATED
REGARDING LAST MEDICATED FOR PAIN AT 1512 FOR PAIN WTIH 2 PERCOCET 10/325MG
TABLETS, AND THAT SHE IS NOT DUE AT THIS TIME, PT ALSO DURING THIS
CONVERSATION IS DROWSY AND NODS OFF MULTIPLE TIMES REQUIRING LOUD VERBAL
STIMULI TO AROUSE AGAIN. VERBALIZES UNDERSTANDING.

## 2018-10-10 NOTE — NUR
APPEARS SLEEPING WITH EYES CLOSED AND O2 VIA NASAL CANNULA IN PLACE. CALL
LIGHT IN REACH.  WILL CONT TO MONITOR.

## 2018-10-10 NOTE — NUR
PT UP TO RR WITH MINIMAL ASSIST. VOIDED CLEAR YELLOW URINE. CALLIE DRAIN
EMPTIED 30cc OF SANGUINOUS DRANIGE. PT COMPLAINS OF BURNING WITH URINATION.
WILL LET MD KNOW AND COLLECT URINE. PT HAVING LITTLE PAIN BUT DOES NOT WANT A
PAIN PILL. CALL BELL IN REACH WILL CONTINUE TO MONITOR.

## 2018-10-10 NOTE — NUR
PT REMAINS DROWSY. UNABLE TO HOLD A CONVERSATION WITHOUT FALLING ASLEEP. PT
INSTRUCTED TO DRINK MAG CITRATE AS ORDERED AND USE IS. PT STATES "WE WANT TOO
MUCH". WRITER INFORMS PT THAT THESE THINGS ARE PERTINENT TO HER HEALING
PROCESS.

## 2018-10-10 NOTE — NUR
PT BARELY ABLE TO KEEP EYES OPEN AS SHE EATS HER BREAKFAST. AWAKES TO VERBAL
STIMULI. ASSESMENT COMPLETED AT THIS TIME (SEE INTERVENTIONS). LUNG SOUNDS
CLEAR. HEART SOUNDS REGULAR. SCANT EDEMA NOTED TO RIGHT KNEE. PT COMPLAINING
OF PAIN AT A 8. WILL MEDICATE FOR PAIN. CALL KLINE IN REACH WILL CONTINUE TO
MONITOR.

## 2018-10-10 NOTE — NUR
PT RESTING QUIETLY IN CHAIR. PICC FLUSHED PER PROTOCOL. PHYSICAL THERAPY IN TO
ASSIST PT BACK TO BED. PT TOLERATED WELL.

## 2018-10-10 NOTE — NUR
PATIENT RESTING IN BED WITH HOB ELEVATED AND O2 VIA NASAL CANNULA IN PLACE AT
2LPM-APPEARS SLEEPING WITH EYES CLOSED.  CALL LIGHT IN REACH. WILL CONT TO
MONITOR.

## 2018-10-10 NOTE — NUR
DR GARLAND IN TO SPEAK WITH PT ON PLAN OF CARE. PT VERBALIZES UNDERSTANDING.
WISHES TO GO TO REHAB IN [place].

## 2018-10-10 NOTE — NUR
PT ON THE PHONE AT THIS. OOB IN THE RECLINER. TOLERATINING MAG CITRATE WELL.
REQUESTING TO BE PUT BACK INTO BED. CALL BELL IN REACH. WILL CONTINUE TO
MONITOR.

## 2018-10-10 NOTE — NUR
AM. PT WAS SEEN FOR G.T SHE WAS SEEN RESTING IN THE RECLINER, STATES THAT SHE
JUST HAD PAIN MEDS. PT'S ALERTNESS WAS DIMINISHED BUT WAS ABLE TO COMPREHEND
AND CONVERSE. SHE STOOD UP FROM THE RECLINER WITH CGA AND CONSTANT VERBAL
CUES. SHE THEN REQUESTED TO GO TO THE TOILET. SHE AMB. AT LEAST 10 FT. WITH RW
AND CGA TO GET TO THE BATHROOM. VC WAS CONSTANTLY PROVIDED FOR PROPER HAND
PLACEMENT AND WB. SHE WAS THEN ASSISTED TO SIT BACK IN THE RECLINER, WITH RW,
CGA AND VCS. LEFT PT W/ CALL BELL BESIDE HER. NO ADVERSE RXNS NOTED OR
REPORTED.

## 2018-10-11 NOTE — NUR
PATIENT ASISSTED OOB TO BSC TO VOID, NO BM YET EVEN AFTER TAKING STOOL SOFTNER
AND MOST OF A BOTTLE OF MAG CITRATE. PATIENT ASKING FOR PAIN MEDS BUT TOO
EARLY FOR PERCOCET.  ASSISTED BACK TO BED AND MEDICATED WITH XANAX FOR
ANXIETY.  PATIENT WITH SHAKES AT TIMES.  RIGHT KNEE INCISION IS NAEL WITH
DERMABOND IN PLACE.  1 BLISTER NOTED TO THE MIDDLE OF THE INCISION-STILL FLUID
FILLED.  SOME SLIGHT SWELLING AND ECCYMOSIS NOTED TO RIGHT KNEE. USING ICE
PACK TO RIGHT KNEE ON AND OFF THROUGHOUT THE NIGHT.  ENCOURAGED USE OF
IS-DEMONSTRATES USE OF THE DEVICE.  CALL LIGHT IN REACH. WILL CONT TO MONITOR.

## 2018-10-11 NOTE — NUR
Pt seen this pm for bed exercise. She was with family member and they reported
she had gotten out of bed before lunch and just return to bed before
therapist got there. She stated she had use walker and went into BR and then
got into bed. Therapeutic ex and expections for rehab discussed with pt.

## 2018-10-11 NOTE — NUR
PATIENT APPEARS SLEEPING AT THIS TIME WITH O2 VIA NASAL CANNULA IN PLACE.
EYES CLOSED.  RESP ARE EVEN AND SHALLOW.  CALL LIGHT IN REACH.  WILL CONT TO
MONITOR.

## 2018-10-11 NOTE — NUR
FENTANYL PATCH X 2 TO LEFT SIDE OF CHEST REMOVED. PATCHES SENT TO PHARMACY,
RETRIEVED BY FOUZIA. NEW PATCHES X 2 APPLIED TO RIGHT SIDE OF CHEST. PERCOCET
PO 2 TABS ADMINISTERED FOR ACUTE RIGHT KNEE PAIN. ICE PACK APPLIED.

## 2018-10-11 NOTE — NUR
PT ASSISTED TO RESTROOM AND THEN TO CHAIR AT BEDSIDE FOR LUNCH. STAND BY
ASSIST AND WALKER ONLY NEEDED. PT TOLERATED ACTIVITY WELL.

## 2018-10-11 NOTE — NUR
PT ASSISTED TO AMBULATED TO RESTROOM USING WALKER AND 1X STANDBY ASSIST, PT
AMBULATED VERY WELL WITH MINIMAL ASSISTANCE. PT ASSISTED BACK INTO BED AND
POSITIONED FOR COMFORT. PT MEDICATED FOR PAIN REPORTED 10/10 AND PM
MEDICATIONS AS ORDERED. PT REMINDED OF IS USE AND ICE PACK APPLIED AGAIN TO
RIGHT KNEE. POSITIONING OF KNEE FOR STRAIGHTENING DISCUSSED W/PT, SHE IS
WANTING TO PLACE PILLOW UNDER KNEE TO KEEP IT SLIGHTLY BENT. I ASSISTED
POSITIONING WITH PILLOW UNDER FOOT TO COMFORTABLY MAINTAIN THE KNEE STRAIGHT.
PT REPORTS COMFORT WITH THIS POSITION.

## 2018-10-11 NOTE — NUR
PT REPORTS SEVERE PAIN TO RIGHT KNEE. PAIN MEDICATIONS AND SCHEDULE REVIEWED.
PERCOCET 2 TABS PO ADMINISTERED. WILL RE-EVAL FOR EFFECTIVENESS. PLAN OF CARE
DISCUSSED. REPORTING OF CONCERNS ENCOURAGED. CALL LIGHT REVIEWED AND IN REACH.
PT ASSISTED TO CHAIR FOR BREAKFAST W/ STANDBY ASSIST AND WALKER. TOLERATED
ACTIVITY WELL. BOWEL SOUNDS HYPOACTIVE. PT DENIES BM SINCE 10/8. PT STATES
THIS IS NOT UNCOMMON FOR HER. O2 @ 2L VIA NC. BREATH SOUNDS CLEAR AND
DIMINISHED IN BASES.

## 2018-10-11 NOTE — NUR
BEDSIDE REPORT RECEIVED FROM DAY NURSE, PT IS IN HIGH FOWLERS POSITION
WATCHING TV W/LIGHTS ON. I.S. IS AT BEDSIDE W/IN REACH, SCD IS ON UNAFFECTED
LEFT LEG. ICE PACK APPLIED TO RIGHT KNEE. PT IS LOCX3 AND DENIES ANY NEEDS AT
THIS TIME. SHE HAS BEEN ENCOURAGED TO CALL IF ANY NEEDS ARISE AND CALL LIGHT
IS IN HAND.

## 2018-10-11 NOTE — NUR
DR. GARLAND IN TO SEE PT. BLISTER TO EDGE OF DERMABOND DRESSING ON RIGHT KNEE
OPENED AND CX SWABBED BY DR. GARLAND. SPECIMEN SENT TO LAB. GAUZE DRESSING
APPLIED. PT TOLERATED WELL. NO COMPLAINTS. DISCHARGE TO REHAB WHEN BED
AVAILABLE DISCUSSED. PT STATES UNDERSTANDING.

## 2018-10-11 NOTE — NUR
Pt seen this am for treatment. She was sitting in chair stated she had been up
since 7:30 (920 when seen). She performed supine and sitting ex x 20 reps
each. Ambulated 1x 20', 1x25' and then 1'12' with RW and CGA. Pt required min
assist to move sit to stand and to assist RLE into bed. Verbal cues to stand
tall while ambulating. Knee flex was to 90 degree and ext approx -20 degrees.
Pt was cooperative with treatment. Pt left resting in bed with proper
positioning, compression on LLE, call bell/phone and tray in reach.
 Pt requested ice for her knee and coffee, nursing aware.

## 2018-10-11 NOTE — NUR
APPEARS SLEEPING WITH EYES CLOSED AND HOB ELEVATED.  O2 VIA NASAL CANNULA IN
PLACE.  RESP ARE SHALLOW AND EVEN.  CALL LIGHT IN REACH. WILL CONT TO MONITOR.

## 2018-10-11 NOTE — NUR
PATIENT C/O 10/10 POST-OP RIGHT KNEE PAIN-MEDICATED WITH PERCOCET 10/325MG
TABS 2 FOR POST-OP PAIN.  CALL LIGHT IN REACH. WILL CONT TO MONITOR.

## 2018-10-12 NOTE — NUR
The pt. was found resting in bed and agreeable to participate in therapeutic
exercises in bed. Pt. performed AAROM ankle PF/DF, AAROM knee
flexion/extension and AAROM hip abduction to the R LE 2x10 repetitions with
continual verbal encouragement. Pt. refuses ambulation as she is awaiting
transfer to facility. The pt. was left resting comfortably in bed without
questions or concerns. Call light and bedside table left within reach. Nurse
informed of same.

## 2018-10-12 NOTE — NUR
AM TX. PT WAS SEEN RESTING ON BED WITH NRSNG ON BEDSIDE PERFORMING DRESSING
CHANGE. PT THEN AGREED TO PARTICIPATE WITH PT. MOD. INDEP. GETTING OOB WITH
THE USE OF B UE IN SLIDING AND SCOOTING SELF ON EOB. PT THEN DID SIT TO STAND
WITH SBA, CGA AND VERBAL CUES ON HAND PLACEMENT. SHE THEN STARTED AMBULATING
FROM BED TO THE END OF THE ROOM THEN ACROSS THE OTHER ROOM ~30 FT. X 2. THIS
WAS THE FARTHEST DISTANCE PT HAD WALKED SINCE SHE WAS ADMITTED. PT DID NOT
HAVE ANY COMPLAINTS DURING AND AFTER AMB. SHE WENT BACK IN THE ROOM AND SAT IN
THE RECLINER WITH RW, CGA AND VERBAL CUES FOR FALL PREVENTION. NOTED
IMPROVEMENT ON STABILITY, BALANCE AND GAIT PATTERN. NO ADVERSE RXNS NOTED OR
REPORTED AT THE END OF TX.

## 2018-10-12 NOTE — NUR
PT MEDICATED FOR PAIN 10/10. PT IS WATCHING TV AND TALKATIVE. KNEE ICED
W/ICE PACK. PT DENIES ANY OTHER NEEDS. INCISION APPEARS HEALTHY AND DERMABOND
IS INTACT. DRESSING TO DISTAL INCISION CDI.

## 2018-10-12 NOTE — NUR
PT MEDICATED FOR PAIN, NO SIGNS OF DISTRESS NOTED, RESP EVEN AND UNLABORED.
CALL LIGHT IN REACH,CONTINUE TO MONITOR.

## 2018-10-12 NOTE — NUR
Discharge instructions given. Patient verbalizes understanding of same.
Discharged in stable condition [facility]
with
*Other. All belongings sent with pt.

## 2018-10-12 NOTE — NUR
PT ASSISTED TO BATHROOM WITH WALKER AND WRITER AS STAND BY. PT AMBULATED VERY
WELL. PT WAS ASKED IF SHE WOULD LIKE TO GET UP TO THE CHAIR FOR BREAKFAST. PT
STATED" NO THEY KEPT ME UP ALL NIGHT WITH CHECKING MY BLOOD PRESSURE AND IM
COLD I WOULD JUST LIKE TO GO BACK TO BED. PT PLACED BACK IN BED. BREAKFAST
TRAY IN FRONT OF PT. NO COMPLAINTS AT THIS TIME. CALL BELL IN REACH.

## 2018-10-12 NOTE — NUR
AIDE REPORTED PT BP LOW@84/55. PT WAS SLEEPING SOUNDLY, SHE DENIES DIZZINESS
BUT REPORTS "FEELING SLEEPY." I ASSISTED HER IN SELF POSITIONING IN BED,
OBSERVED USE OF IS AND HAD HER DRINK ICE WATER AND SOME GRAPE JUICE/PT
PREFERENCE. MANUAL BP ASSESSED FOLLOWING 95/65,HR 92.  WILL CONTINUE TO
MONITOR.

## 2019-03-26 NOTE — NUR
PT WATCHING TV AT THIS TIME. PT IS A&0 x3. ASSESMENT COMPLETED AT THIS TIME.
BRUSING NOTED TO LEFT THIGH FROM 2 WEEKS AGO AND SCABBED AREA FROM FALL 2 DAYS
AGO. PT INFORMED TO CALL TO NURSING STAFF TO USE THE RESTROOM FOR HER SAFETY.
PT VERBALIZES UNDERSTANDING. NO OTHER NEEDS AT THIS TIME. CALL BELL IN REACH.
WILL CONTINUE TO MONITOR.

## 2019-03-26 NOTE — NUR
PT TO ROOM VIA WC ACCOMPANIED BY VOLUNTEER; MODERATE ASSIST TO BED; PT A/O X4;
NO COMPLAINTS OF PAIN OR DISCOMFORT; PT C/O SOB AND PRODUCTIVE COUGH X2 WEEKS.
O2 2L VIA NC, LUNG SOUNDS CRACKLES IN THE BASES; PT ORIENTED TO ROOM AND CALL
SYSTEM; VS AND WT OBTAINED; CALL BELL WITHIN REACH; WILL CONTINUE TO MONITOR.

## 2019-03-27 NOTE — NUR
PT RESTING IN BED WITH EYES CLOSED. O2 @ 3L. RESP EVEN AND UNLABORED. CALL
KLINE IN REACH. WILL CONTINUE TO MONITOR.

## 2019-03-27 NOTE — NUR
PT C/O OF GENERALIZED PAIN 8/10 STATES IT HURTS FROM THE BOTTOM OF HER FEET TO
THE TOP OF HER HEAD. NO OTHER NEEDS AT THIS TIME. CALL BELL IN REACH. WILL
CONTINUE TO MONITOR.

## 2019-03-27 NOTE — NUR
PT AND SPOUSE NQUIRING ABOUT HOME MEDS SINCE THIS AM, DR GARLAND NOTIFIED OF
CONCERNS AND REMINDED PHARMACY DOES NOT ACCEPT "HOME MEDS AS BEFORE", PHARMACY
ALSO NOTIFIED OF HOME MEDS AND PT & SPOUSE REQUEST, STILL AWAITING PROFILING
OF MEDS AT THIS TIME.

## 2019-03-27 NOTE — NUR
PT RESTING IN BED WITH EYES CLOSED. NO DISTRESS NOTED. CALL LIGHT WITHIN
REACH. WILL CONTINUE TO MONITOR.

## 2019-03-27 NOTE — NUR
SHIFT CHANGE REPORT, PT SLEEPING BUT AROUSES TO VERBAL STIMULI, O2 @ 3L VIA
NC IN PLACE, NO C/O DISCOMFORT AT THIS TIME, CALL BELL IN REACH.

## 2019-03-27 NOTE — NUR
REPORT FROM TISHA SUNSHINE. PT RESTING IN BED. NO DISTRESS NOTED. IV SITE APPEARS
HEALTHY WITH IV FLUIDS INFUSING. PT C/O GENERALIZED PAIN. WILL MEDICATED AS
ORDERED. RESPIRATIONS EVEN AND UNLABORED 02 @ 3L/M VIA NC. DISCUSSED POC. PT
VERBALIZED UNDERSTANDING. CALL LIGHT WITHIN REACH. WILL CONTINUE TO MONITOR.

## 2019-03-28 NOTE — NUR
PT RESTING IN BED, NO SIGNS OF DISTRESS NOTED, RESP EVEN AND UNLABORED. PT
ALERT AND ORIENTED X3. PT HAS A FENTANYL PATCH TO R CHEST. DISCUSSED POC.
MEDICATED PER MAR. ASSESSMENT COMPLETED. CALL LIGHT IN REACH,CONTINUE TO
MONITOR.

## 2019-03-28 NOTE — NUR
DR. GARLAND AT BEDSIDE TO DISCUSS POC WITH PT.  PT IS ALERT BUT DORWSY. PT
DENIES ANY PAIN AT THIS TIME. #22 RW THAT APPEARS HEALTHY. SAFETY PRECAUTIONS
REINFORCED AND CALL LIGHT IN REACH.

## 2019-03-28 NOTE — NUR
PT RESTING IN BED WITH EYES CLOSED SNORING. NO S/S OF DISTRESS NOTED. CALL
LIGHT WITHIN REACH. WILL CONTINUE TO MONITOR.

## 2019-03-28 NOTE — NUR
PT IS SITTING IN RECLINER WITH NO S/S OF DISTRESS NOTED. PT DENIES ANY NEEDS
AT THIS TIME. CALL LIGHT IN REACH.

## 2019-03-28 NOTE — NUR
PT RESTING IN BED, REQUESTING SLEEPING PILL, PT MEDICATED PER MAR. CALL LIGHT
IN REACH,CONTINUE TO MONITOR.

## 2019-03-29 NOTE — NUR
PT RESTING IN BED WITH EYES CLOSED, NO SIGNS OF DISTRESS NOTED, RESP EVEN AND
UNLABORED. CALL LIGHT IN REACH,CONTINUE TO MONITOR.

## 2019-03-29 NOTE — NUR
PT IS IN LOW FOWLERS POSITION W/LIGHTS LOW AND TV ON. SHE IS WATCHING TV.
DENIES ANY PAIN OR NEEDS AT THIS TIME. POC AND MED SCHEDULE DISCUSSED AT THIS
TIME. CALL LIGHT AT BEDSIDE.

## 2019-03-29 NOTE — NUR
PT IS DROWSY. PT STATED SHE IS SLEEPY THAT SHE WOKE UP EARLY TODAY. PT
DENIES ANY NEEDS AT THIS TIME. CALL LIGHT IN REACH.

## 2019-03-29 NOTE — NUR
ASSESSMENT DONE. PT IS A&O X3. PT STATED PAIN IN BACK 5/10 BUT DENIES ANY PAIN
MEDICATION AT THIS TIME. #22 RW THAT APPEARS HEALTHY. SAFETY PRECAUTIONS
REINFORCED AND CALL LIGHT IN REACH.

## 2019-03-30 NOTE — NUR
PT MEDICATED FOR ANXIETY PER REQUEST. PT IN LOW FOWLERS PSOITIONS WATCHING TV
W/LIGHTS OUT. NO S/O DISTRESS. GRAPEJUICE PROVIDED PER REQUEST.

## 2019-03-30 NOTE — NUR
REPORT RECEIVED FROM JONG ADAMS; PT APPEARS TO BE SLEEPING WITH EYES CLOSED;
RESP EVEN AND UNLABORED; CALL KLINE IN REACH; WILL CONTINUE TO MONITOR.

## 2019-03-30 NOTE — NUR
D/C INSTRUCTIONS GIVEN TO PT; PHARMACY ; PT COMING TO HOME; FOLLOW UP
WITH DR GARLAND; VERBALIZE UNDERSTANDING; IV REMOVED, CATH INTACT; WAITING FOR
HER RIDE;

## 2019-03-30 NOTE — NUR
PT CALLED TO REPORT FEELING SOB, 02 IS OFF/PT ASSISTED IN REPLACING 02NC. AIDE
IS IN OBTAINING V/S, PT IS SITTING ON SIDE OF THE BED/ENCOURAGED TO DEEP
BREATHE THROUGH NOSE. RESPIRATORY CALLED FOR BREATHING TREATMENT.

## 2019-08-31 NOTE — NUR
PATIENT TRANSPORTED TO [facility] VIA STRETCHER WITH TELE AND O2 IN PLACE. MARYURI SUNSHINE AT BEDSIDE. CARE RELINQUISHED. BELONGINGS SENT WITH PATIENT, CANE, CLOTHING
AND PURSE

## 2019-08-31 NOTE — NUR
PATIENT REPORTS NON SYNCOPAL FALL X2 DAYS AGO WHILE ON THE TOILET. FELL ONTO
LEFT SIDE HITTING LEFT RIBS ON BATHTUB. PATIENT NOTED TO HAVE ECCHYMOSIS AND
PAIN TO LEFT RIBS AND LEFT HIP. ECCHYMOSIS NOTED UNDER LEFT EYE, STATES FALLING
WEEKS AGO. IS ALERT AND ORIENTED X4, LUNG SOUNDS CLEAR BILATERALLY. REPORTS
BEING ON HOME O2 WHEN NEEDED UPTO 4L/MIN. PATIENT PLACED ON 2L/MIN OF O2 VIA
NASAL CANNULA FOR O2 SAT OF 86%, O2 SAT 92% ON 2L OF O2.

## 2019-08-31 NOTE — NUR
PATIENT UP TO BSC TO VOID 200CC OF ORANGE COLORED URINE-PATIENT IS TAKING AZO
AT HOME. BLADDER SCAN IS 87CC AT THIS TIME. PATIENT CONT TO C/O BURNING AND
FREQUENCY-URINE CULTURE ALREADY DONE. PATIENT WITH IVF NS PATENT AND INFUSING
AT 100CC/HR AS ORDERED.  AZITHROMYCIN 500MG IVPB HUNG AS ORDERED.  PATIENT
MEDICATED WITH XANAX 0.5MG PO FOR ANXIETY, ROXICODONE 10MG PO FOR LEFT RIB AND
LOW BACK PAIN-6/10 ON PAIN SCALE.  SAFETY PRECAUTIONS REINFORCED. CALL LIGHT
IN REACH. WILL CONT TO MONITOR.

## 2019-08-31 NOTE — NUR
PATIENT ADMITTED FROM ER VIA STRETCHER WITH ER STAFF IN ATTENDANCE.  PATIENT
IS ABLE TO STAND AND WALK TO STANDING SCALE AND THEN TO BED.  PATIENT IS AWAKE
ALERT AND ORIENTEDX3. O2 VIA NASAL CANNULA IN PLACE. TELE MONITOR IN PLACE.
SALINE LOCK TO LEFT AC INTACT AND APPEARS HEALTHY AT THIS TIME. PATIENT WITH
DURGESIC PATCH TO RIGHT CHEST-PLACED 08/28/19 PER PATIENT. PATIENT IS BEING
ADMITTED FOR PNEUMONIA, FX LEFT 8TH RIB,PATIENT WITH HISTORY OF FALLS AT HOME.
FELL OFF TOILET ONTO BATHTUB 2 DAYS AGO.BRUISING NOTED TO LEFT SIDE. PATIENT
PROVIDED WITH DINNER TRAY.  ORIENTED TO ROOM AND SURROUDINGS.  INSTRUCTED ON
USE OF NURSE CALL LIGHT, TV REMOTE AND TELEPHONE. SAFETY PRECAUTIONS
REINFROCED. CALL LIGHT IN REACH. WILL CONT TO MONITOR.

## 2019-08-31 NOTE — NUR
PATIENT ASSISTED TO AND FROM BSC, URINE NOTED TO BE RUPINDER IN COLOR. STATES PAIN
A AZO FOR UTI SYMPTOMS.

## 2019-08-31 NOTE — NUR
PATIENT RESTING ON STRETCHER WITH EYES CLOSED O2 SAT 88%, O2 INCREASED TO
4L/MIN AND INSTRUCTED TO TAKE SLOW DEEP BREATHS. O2 SAT 95% AT THIS TIME.

## 2019-09-01 NOTE — NUR
PATIENT SITTING UP IN CHAIR AT THIS TIME WITH O2 VIA NASAL CANNULA IN PLACE AT
3LPM. PATIENT IS AWAKE ALERT AND ORIENTEDX3-PATIENT WITH NOTED TREMORS.
PATIENT C/O SEVERE LEFT RIB AND LOW BACK PAIN-9/10 ON PAIN SCALE. MEDICATED
WITH OXYCODONE 10MG PO GIVEN FOR PAIN. PATIENT WITH IV SITE TO LEFT AC WITH
IVF NS PATENT AND INFUSING AT 100CC/HR. TELE MONITOR IN PLACE. SAFETY
PRECAUTIONS REINFORCED. CALL LIGHT IN REACH. WILL CONT TO MONITOR.

## 2019-09-01 NOTE — NUR
PATIENT RESTING IN BED TALKING IN THE PHONE .  PATIENT STATED THAT THE PAIN
MEDICATION DID HELP SOME IT IS 6/10. PATIENT DENIES ANY NEEDS AT THIS TIME.
CALL LIGHT IN REACH.

## 2019-09-01 NOTE — NUR
APPEARS SLEEPING WITH O2 VIA NASAL CANNULA IN PLACE.  IVF PATENT AND INFUSING
AS ORDERED VIA LEFT AC SITE. TELE MONITOR IN PLACE. CALL LIGHT IN REACH. WILL
CONT TO MONITOR.

## 2019-09-01 NOTE — NUR
REASSESS PT PAIN . PT STATED PAIN IS 10/10 IN LEFT RIB. MEDICATED PT WITH
OXYCODOINE SEE  EMAR. CALL LIGHT IN REACH.

## 2019-09-01 NOTE — NUR
REPORT WAS RECEIVED FROM MAYRURI. PATIENT IS SITTING IN THE SIDE OF THE BED.
PATIENT IS A&O X3. TELE IN PLACE. O2 AT 3L VIA NC. IVF INFUSING WELL. PATIENT
HAS A DRY COUGH. PATIENT TAKE SHALLOW BREATHS. ENCOURAGE PATIENT TO TAKE DEEP
BREATHS. PATIENT STATED PAIN IN LEFT RIB IS 9/10. APPLIED HER FENTANYL
PATCH IN LEFT CHEST FOR HER PAIN. PATIENT STATED SHE WANTED ALL HER PAIN
MEDICATION BECAUSE THE PATCH IS NOT GOING TO HELP  HER.  TOLD PATIENT I WILL
FIRST REASESS HER PAIN THEN GAVE HER MORE PAIN MEDICATION IF NEEDED. SAFETY
PRECAUTIONS REINFORCED AND CALL LIGHT IN REACH.

## 2019-09-01 NOTE — NUR
PATIENT RESTING IN BED AT THIS TIME WITH O2 VIA NASAL CANNULA IN PLACE.
PATIENT CONT WITH TREMORS. MEDICATED WITH XANAX 0.5MG PO FOR ANXIETY, PATIENT
PROVIDED WITH CRANBERRY JUICE. IV SITE TO LEFT AC INTACT WITH IVF NS PATENT
AND INFUSING AS ORDERED.  TELE MONITOR IN PLACE. SAFETY PRECAUTIONS REFORCED.
CALL LIGHT IN REACH. WILL CONT TO MONITOR.

## 2019-09-01 NOTE — NUR
PATIENT ASSIST OOB TO BSC TO VOID 300CC OF ORANGE URINE.  ASSISTED BACK TO
BED. O2 VIA NASAL CANNULA IN PLACE. TELE MONITOR IN PLACE. IVF NS PATENT AND
INFUSING LEFT AC SITE AT 100CC/HR. SITE REMAINS HEALTHY AT THIS TIME. SAFETY
PRECAUTIONS REINFORCED. CALL LIGHT IN REACH. WILL CONT TO MONITOR.

## 2019-09-02 NOTE — NUR
PT AWAKE RESTING IN BED. RESP EVEN AND UNLABORED. HEPLOCK PATENT. PT
ASSESSMENT UNCHANGED. REQUESTING XANAX. MEDICATED WITH XANAX 0.5MG P.O FOR
SLIGHT ANXIETY. CALL BELL WITHIN REACH. TELE SR.

## 2019-09-02 NOTE — NUR
PT OOB IN RECLINER.  RESP EVEN AND UNLABORED. PT HAS BEEN USING INSENTIVE
SPIROMETRY. MEDICATED WITH ROXICODONE 10MG P.O FOR LEFT RIB CAGE DISCOMFORT.
PER DEE DEE KHAN MAY D/C IVF.  IV CONVERTED TO HEPLOCK.  TELE INTACT.  NO
INCREASE TO BRUISING OVER LEFT LATERAL BACK OR LEFT KNEE.  FREQUENT ROUNDS
MADE. CALL BELL WITHIN REACH.

## 2019-09-02 NOTE — NUR
PATIENT RESTING IN BED WITH O2 VIA NASAL CANNULA IN PLACE. TELE MONITOR IN
PLACE. PATIENT CONT TO HAVE MODERATE TREMORS-C/O LEFT RIB AND CHRONIC BACK
PAIN 9/10 ON PAIN SCALE.  MEDICATED WITH OXYCODONE 10MG PO FOR PAIN AND WITH
RESTORIL 15MG PO FOR SLEEP. PATIENT PROVIDED WITH CRANBERRRY JUICE PER PATIENT
REQUEST. SAFETY PRECAUTIONS REINFORCED. CALL LIGHT IN REACH. WILL CONT TO
MONITOR.

## 2019-09-02 NOTE — NUR
PATIENT RESTING IN BED WITH HOB ELEVATED-O2 VIA NASAL CANNULA IN PLACE. TELE
MONITOR IN PLACE. CALL LIGHT IN REACH. WILL CONT TO MONITOR.

## 2019-09-02 NOTE — NUR
PATIENT RESTING IN BED AT THIS TIME WITH O2 VIA NASAL CANNULA IN PLACE AT
3LPM. PATIENT IS AWAKE ALERT AND ORIENTEDX3. TELE MONITOR IN PLACE. SALINE
LOCK TO LEFT AC INTACT AND IS HEALTHY WILL GOOD BLOOD RETURN. REJI GODDARD AS
ORDERED. NO BM TODAY-PROVIDED PATIENT WITH WARM PRUNE JUICE FOR CONSTIPATION.
SAFETY PRECAUTIONS REINFORCED. CALL LIGHT IN REACH. WILL CONT TO MONITOR.

## 2019-09-02 NOTE — NUR
PATIENT RESTING IN BED WITH HOB ELEVATED AND O2 VIA NASAL CANNULA.  IVF NS
PATENT AND INFUSING AS ORDERED. VS TAKEN AND RECORDED. AFEBRILE. TELE MONITOR
IN PLACE. CALL LIGHT IN REACH. WILL CONT TO MONITOR.

## 2019-09-02 NOTE — NUR
PATIENT STATES THAT THE PRUNE JUICE UPSET HER STOMACH WAS HAVING SEVERE
EPIGASTRIC BURNING.  PROVIDED WITH GINGERALE.  MEDICATED WITH HS MEDS AS
ORDERED.  RESTING IN BED WITH O2 VIA NASAL CANNULA IN PLACE.  TELE MONITOR IN
PLACE. SAFETY PRECAUTIONS REINFORCED. CALL LIGHT IN REACH. WILL CONT TO
MONITOR.

## 2019-09-02 NOTE — NUR
RESTING IN RECLINER.  SUZANNE PATENT. TELE SR.  OFFERS NO COMPLAINTS. RESP
EVEN AND UNLABORED. CALL BELL WITHIN REACH.

## 2019-09-02 NOTE — NUR
PT AWAKE RESTING IN RECLINER EATING BREAKFAST. RESP EVEN AND UNLABORED. O2 N/C
ON AT 3L. O2 SAT 97%. LUNGS CLEAR BILAT. ABD SOFT AND NONDISTENDED WITH BOWEL
SOUNDS PRESENT. NO LOWER EXT EDEMA NOTED. PEDAL PULSES PALPATED BILAT. IV SITE
PATENT IN LEFT OUTER ASPECT OF A.C. WITH NSS AT 100CC/HR. TELE SR.  PT OFFERS
NO COMPLAINTS AT THIS TIME.  PT ENCOURAGED TO USE INSENTIVE SPIROMETRY Q 1HR
WHILE AWAKE. PT ALSO INSTRUCTED TO FREQUENTLY DO COUGHING AND DEEP BREATHING
EXERCISES.  PT STATES SHE UNDERSTANDS. FREQUENT ROUNDS MADE. CALL BELL WITHIN
REACH.

## 2019-09-03 NOTE — NUR
PT MEDICATED WITH ROXICODONE 10MG P.O FOR RIB DISCOMFORT. PHYSICAL THERAPY
WITH PT.  CALL KLINE WITHIN REACH.

## 2019-09-03 NOTE — NUR
O2 N/C REMOVED FOR 15 MIN.  O2 SAT 89%. RESP EVEN AND UNLABORED. NO DISTRESS
NOTED.  O2 N/C REAPPLIED AT 3L O2 SAT 94-95%.  PT STATES SHE IS REFUSING TO BE
TRANSFERRED TO ICU AND DOES NOT WANT TO WEAR HOSPITAL CPAP AND LAST TIME SHE
WAS HERE SHE COULD NOT TOLERATE IT.  PT REQUEST DR GARLAND BE CALLED AND
INFORMED SHE IS REFUSING TO GO TO ICU.

## 2019-09-03 NOTE — NUR
PT RETURNED FROM HAVING RT UPPER ARM PICC INSERTED.  RT UPPER ARM PICC SITE NO
REDNESS,BLEEDING OR SWELLING AT SITE. DRESSING IS CLEAN AND DRY AND DATE.
STRONG RADIAL PULSE PALPATED. PT DENIES ANY DISCOMFORT. CALL BELL WITHIN
REACH.

## 2019-09-03 NOTE — NUR
CALL PLACED TO DR GARLAND AND INFORMED HIM PT STATES SHE DOES NOT HAVE A HOME
CPAP THAT HE WANTED HER  TO BRING IN.  NEW ORDER RECEIVED TO TRANSFER
PT TO ICU FOR CPAP AND START SETTING AT 12 WITH SATURATION AND TO CHECK O2 SAT
ON R/A TO SEE IF PT DESATS.  ORDER RECEIVED WITH VERBAL READ BACK CONFIRMATION
AND CHARTED.  SARBJIT RESP THERAPY MADE AWARE OF NEW ORDERS RECEIVED.

## 2019-09-03 NOTE — NUR
PT RESTING IN RECLINER. PICC PATENT. PT ASSESSMENT UNCHANGED. FREQUENT ROUNDS
MADE. CALL BELL WITHIN REACH.

## 2019-09-03 NOTE — NUR
PT WOKE FOR BREAKFAST. PT IS ALERT AND ORIENTED X4. PT IS DROWSY THIS MORNING
STATES SHE DID NOT SLEEP LAST NIGHT .  RESP EVEN AND UNLABORED. O2 N/C ON AT
3L. LUNGS CLEAR IN UPPER LOBES WITH DIMINISHED BREATH SOUNDS IN MID TO LOWER
LOBES. ABD SOFT WITH BOWEL SOUNDS PRESENT. NO LOWER EXT EDEMA NOTED. PEDAL
PULSES PALPATED BILAT. TELE SB PER E.D.  BRUISE ON LEFT BACK AND LEFT KNEE.
HEPLOCK PATENT IN LEFT A.C.  PT DENIES ANY DISCOMFORT AT THIS TIME. INSENTIVE
SPIROMETRY AT BEDSIDE AND PT ENCOURAGED TO USE 10X/HR. CALL BELL WITHIN REACH.

## 2019-09-03 NOTE — NUR
Explained treatment plan to patient this morning, pt. in agreement to
participate in gait training and therapeutic exercises. Sit to stand to RW
done with SBA. Pt. ambulated 2x15 feet using RW with light CGA x1. Forward
flexed posture is noted of which pt. explains has been long standing. Verbal
cues required for pushing RW vs lifting. O2 sats remained in low 90's while
ambulating on room air. Pt. returned to sitting in recliner with pt.
re-applying O2 via NC for seated therapeutic exercises. Pt. instructed on and
performed seated heel and toe raises 2x20 repetitions, seated alternating knee
extensions 2x10 repetitions and seated marching 2x10 repetitions. Pt. left
resting in recliner with call light and bedside table within reach. AM-PAC
score of 16; SNF/IRF. However pt. stated she prefered home health physical
therapy due to difficulty obtaining transportation. Spoke with evaluating PT
regarding same.

## 2019-09-03 NOTE — NUR
PATIENT SITTING UP IN RECLINER AT THIS TIME-AWAKE ALERT AND ORIENTEDX3.
PATIENT ON CONTACT PRECAUTIONS FOR ESBL. USING O2 VIA NASAL CANNULA
PRN-PATIENT ON RA AT THIS TIME-IN NO ACUTE DISTRESS.  LUNG ARE DIMINISHED
THROUGHOUT. ENCOURAGED USE OF IS Q1H WHILE AWAKE AND DEEP BREATHING EXERCISES.
TELE MONITOR IN PLACE. DOUBLE PLICC TO RIGHT UPPER ARM-SITE APPEARS HEALTHY AT
THIS TIME. SAFETY PRECAUTIONS REINFORCED. CALL LIGHT IN REACH. WILL CONT TO
MONITOR.

## 2019-09-03 NOTE — NUR
RESTING IN BED-VOIDED 200CC OF YELLOW URINE-SPEC OBTAINED AND SENT TO LAB.
SAFETY PRECAUTIONS REINFORCED. CALL LIGHT IN REACH. WILL CONT TO MONITOR.

## 2019-09-03 NOTE — NUR
PT AWAKE RESTING IN BED. RESP EVEN AND UNLABORED. PICC PATENT. OFFERS NO
COMPLAINTS. CALL BELL WITHIN REACH.

## 2019-09-03 NOTE — NUR
PATIENT RESTING IN BED AT THIS TIME WITH O2 VIA NASAL CANNULA IN PLACE.
PATIENT C/O LEFT RIB AND LOW BACK PAIN-MEDICATED WITH ROXICODONE 10MG PO FOR
PAIN AND WITH XANAX FOR ANXIETY. PATIENT VOIDED 600CC OF RUPINDER URINE.
INSTRUCTED NEED FOR URINE SPEC WHEN ABLE TO PROVIDE. VERBALIZES UNDERSTANDING.
TELE MONITOR IN PLACE. SAFETY PRECAUTIONS REINFORCED. CALL LIGHT IN REACH.
WILL CONT TO MONITOR.

## 2019-09-03 NOTE — NUR
DR GARLAND HERE TO SEE PT.  DR GARLAND AWARE PT IS POSITIVE FOR ESBL AND DR GARLAND STATES HE IS PUTTING IN ORDERS FOR PICC LINE AND LABS FOR A.M.  CASE
MANAGEMENT PT IS GOING TO BE CHANGED TO IN PT STATUS.

## 2019-09-04 NOTE — NUR
PATIENT RESTING IN BED AT THIS TIME-IVPB MERRUM INFUSING AS ORDERED VIA RIGHT
UPPER ARM PICC-SITE IS HEALTHY WITH GOOD BLOOD RETURN. TELE MONITOR IN PLACE.
NO COMPLAINTS AT THIS TIME.  SAFETY PRECAUTIONS REINFORCED. CALL LIGHT IN
REACH. WILL CONT TO MONITOR.

## 2019-09-04 NOTE — NUR
DR. GARLAND AT [facility] TO ASSESS PATIENT BUT PATIENT IS DROWSY AT THIS TIME.
CALL LIGHT IN REACH.

## 2019-09-04 NOTE — NUR
PATIENT RESTING IN BED-LAB WORK DRAWN FROM RIGHT UPPER ARM PICC-FLUSHED PER
PROTOCOL WITH SALINE AND HEP. EKG BEING DONE.  CALL LIGHT IN REACH. WILL CONT
TO MONITOR.

## 2019-09-04 NOTE — NUR
PATIENT IS SITTING IN RECLINER EATING HER LUNCH. PATIENT STATED PAIN IN RIBS
8/10. TOLD PATIENT PAIN MEDICATION IS NOT DUE YET. PATIENT VERBALIZED
UNDERSTANDING. CALL LIGHT IN REACH.

## 2019-09-04 NOTE — NUR
ASSESSMENT DONE. PATIENT IS A&O X3. PATIENT IS SITTING IN RECLINER. HEATHER
STATED PAIN IN RIBS 8/10.  MEDICATED PATIENT WITH FENTANYL PATCH IN RIGHT
CHEST AND MAG CITRATE. 02 AT 3L VIA NC. TELE IN PLACE. CALL LIGHT IN REACH.

## 2019-09-04 NOTE — NUR
RESTING IN BED WITH EYES CLOSED. RESP EVEN AND UNLABORED. NO DISTRESS NOTED.
O2 N/C ON AT 3L. FREQUENT ROUNDS MADE. CALL BELL WITHIN REACH.

## 2019-09-04 NOTE — NUR
PT AWAKE RESTING IN BED. RESP EVEN AND UNLABORED. O2 N/C ON AT 3L. LUNGS
REVEAL DIMINISHED BREATH SOUNDS. ABD SOFT AND NONDISTENDED WITH BOWEL SOUNDS
PRESENT. PT HAS +1 BILAT LOWER EXT EDEMA NOTED. PEDAL PULSES PALPATED BILAT.
RT UPPER ARM PICC PATENT WITH DRESSING CLEAN AND DRY.  TELE INTACT. PT OFFERS
NO COMPLAINTS AT THIS TIME.  FREQUENT ROUNDS MADE. CALL BELL WITHIN REACH.

## 2019-09-04 NOTE — NUR
MEDICATED WITH RESTORIL 15MG P.O FOR SLEEP PER PTS REQUEST. RESP EVEN AND
UNLABORED. O2 N/C ON AT 3L. NO DISTRESS NOTED. PICC PATENT. FREQUENT ROUNDS
MADE. CALL BELL WITHIN REACH.

## 2019-09-04 NOTE — NUR
Pt was OOB in chair, c/o being very tried. She stated she would ambulated but
too tried to do much. Pt move to stand with SBA and ambulated 1x 40' in room
with RW. Posture was flexed with pt c/o of L rib pain increasing at end of
walk. 02 was in place during treatment. Pt moved to supine with min assist of
LEs. She reported doing LE ex and soing breathing ex indep. Pt left with call
bell/phone and bedside try in reach. O2 stats were 97% and decreased to 91%
after walking.

## 2019-09-05 NOTE — NUR
RESTING IN BED WITH EYES CLOSED. RESP EVEN AND UNLABORED. ASSESSMENT
UNCHANGED. CALL BELL WITHIN REACH.

## 2019-09-05 NOTE — NUR
Clinician entered patient room, pt laying supine and agreed to participate in
occupational therapy. Pt was able to sit edge of bed with VC's, manuevered RW
to bathroom. Pt was able to sit on toilet and complete thai-care. Pt required
Min A to stand from toilet and used RW to stand by sink to perform grooming.
Pt was able to wash her hands, wash her face and comb her hair with SBA. Pt
perfomed functional mobility to recliner required CGA.
 
AMPAC score 20 with recommendations for home with PT

## 2019-09-05 NOTE — NUR
REPORT FROM EVELYN DODD. PT SITTING UP IN RECLINER AT BEDSIDE. NO DISTRESS
NOTED. 02 VIA NC @ 2L/M. PT C/O PAIN LEFT RIB AREA AND GENERALIZED 10/10.
ENCOURAGED REPOSITIONING AND PROVIDED HEAT PACK. WILL MEDICATED WHEN NEXT DOSE
AVAILABLE. DISCUSSED POC. PT VERBALIZED UNDERSTANDING. CALL LIGHT WITHIN
REACH. WILL CONTINUE TO MONITOR.

## 2019-09-05 NOTE — NUR
PT OOB RESTING IN RECLINER, A&O X3 BUT DROWSY;VS OBTAINED AND ASSESSMENT
COMPLETED;PT DENIES ANY CURRENT PAIN OR DISCOMFORTS JUST REPORTS FEELING
"TIRED", PAIN SCALE AND REPORTING EDUCATED;RESPIRATIONS SHALLOW ON O2 @ 3L VIA
NC, DIMINISHED LUNG SOUNDS WITH NON-PRODUCTIVE COUGH NOTED AT TIMES;ABDOMEN
DISTENDED/SOFT ON PALPATION AND ACTIVE IN ALL 4 QUADRANTS;LAST BM 08/30,
OFFERED PT PRUNE JUICE BUT REFUSED AT THIS TIME;WEAK PEDAL PULSES WITH +2
EDEMA NOTED, ENCOURAGED ELEVATION;GENERALIZED BRUISING NOTED TO LEFT SIDE R/T
FALL PTA;TELE MONITORING IN PLACE;FRANKI DOUBLE LUMEN PICC LINE FLUSHED AND
PATENT, GOOD BLOOD RETURN NOTED;ABX HUNG;PT DENIES ANY ADDITIONAL NEEDS
AND IS ENCOURAGED TO CALL FOR ASSISTANCE IF NEEDED;FALL PRECAUTIONS
NOTED WITH CALL LIGHT IN REACH;WILL CONTINUE TO MONITOR

## 2019-09-05 NOTE — NUR
PT MEDICATED FOR ANXIETY UPON REQUEST. PT MILDLY SOB AT THIS TIME. 02 IN
PLACE. ENCOURAGED DEEP BREATHING AND RELAXATION TECHNIQUES. WILL CONTINUE TO
MONITOR.

## 2019-09-05 NOTE — NUR
REPORT RECEIVED FROM JONG PAIZ;PT OOB RESTING IN RECLINER;INTRODUCED SELF TO
PT AND POC DISCUSSED;PT DENIES ANY CURRENT PAIN OR NEEDS;RESPIRATIONS EVEN AND
UNLABORED,SHALLOW ON O2 @ 3L VIA NC;TELE MONITORING IN PLACE;ENCOURAGED PT TO
CALL FOR ASSISTANCE IF NEEDED;FALL PRECAUTIONS IN PLACE WITH CALL LIGHT IN
REACH;CONTACT PRECAUTIONS NOTED FOR ESBL;WILL CONTINUE TO MONITOR

## 2019-09-05 NOTE — NUR
RESTING IN BED WITH EYES CLOSED. ASSESSMENT UNCHANGED. RESP EVEN AND
UNLABORED. NO DISTRESS NOTED. O2 N/C ON AT 3L. PICC PATENT. TELE INTACT.
FREQUENT ROUNDS MADE. CALL BELL WITHIN REACH.

## 2019-09-05 NOTE — NUR
PT RESTING IN BED WITH EYES CLOSED. RESP EVEN AND UNLABORED. NO DISTRESS
NOTED.  FREQUENT ROUNDS MADE. CALL BELL WITHIN REACH.

## 2019-09-05 NOTE — NUR
PT MEDICATED WITH ROXICONDE 15 MG P.O FOR BACK DISCOMFORT.  RESP EVEN AND
UNLABORED. NO DISTRESS NOTED. O2 N/C ON AT 3L. TELE INTACT. FREQUENT ROUNDS
MADE. CALL BELL WITHIN REACH.

## 2019-09-05 NOTE — NUR
Pt seen this am for ther ex and gait training. She was OOb in chair, continues
to c/o fatigue. Pt performed 20 reps of LEs ex in sitting. Gait 2x 40' with RW
in room and O2 in place with SBA/CGA. IV and O2 line management. Gait
continues to be very flexed with reminders to stand erect. AMPAC  remains at
16 (unchanged). Pt left in chair with O2 in place, call bell/phone and bedside
tray in reach.

## 2019-09-05 NOTE — NUR
PT OOB RESTING IN RECLINER;RESPIRATIONS REMAIN EVEN AND UNLABORED,SHALLOW ON
O2 @ 3L VIA NC;PT REPORTS LEFT SIDE PAIN RATING 10/10 ON THE PAIN SCALE AND
REQUESTS PAIN MEDICATION, PT MEDICATED WITH PRN ROXICODONE 15MG PO;TELE
MONITORING IN PLACE;PT DENIES ANY ADDITIONAL NEEDS AT THIS TIME;WILL CONTINUE
TO MONITOR FOR EFFECTIVENESS

## 2019-09-05 NOTE — NUR
PT OOB SLEEPING IN RECLINER;NO S/S OF DISTRESS NOTED;RESPIRATIONS SHALLOW ON
O2 @ 3L VIA NC;TELE MONITORING IN PLACE;RIGHT UPPER ARM PICCLINE
PATENT;ASSESSMENT REMAINS UNCHANGED;FALL PRECAUTIONS REMAIN IN PLACE WITH CALL
LIGHT IN REACH;WILL CONTINUE TO MONITOR

## 2019-09-06 NOTE — NUR
REPORT RECEIVED FROM DAY NURSE, PT APPEARS TO BE SLEEPING WITH EYES CLOSED;
02@3L NC; CALL BELL IN REACH.

## 2019-09-06 NOTE — NUR
Pt was seen for gait training. She was able to stand up from sitting in the
recliner with SBA. Static standing balance was good. Then, she ambulated in
the room x 2 rounds with rolling walker and SBA. Pt's SPO2 on room air was
from 94% on resting to 89% after the activity. No SOB noted during and after
the activity. She returned to sit in the recliner, leg rest elevated. Call
bell on her lap.

## 2019-09-06 NOTE — NUR
PT MEDICATED FOR PAIN UPON REQUEST. PT ASSISTED BACK TO BED FROM RECLINER AT
THIS TIME. NO APPARENT DISTRESS NOTED. CALL LIGHT WITHIN REACH. WILL CONTINUE
TO MONITOR.

## 2019-09-06 NOTE — NUR
PT AMBULATED TO BATHROOM WITH WALKER WITH STAND BY ASSIST. NO DISTRESS NOTED.
ASSISTED BACK TO BED. NO CURRENT WANTS OR NEEDS. CALL LIGHT WITHIN REACH. WILL
CONTINUE TO MONITOR.

## 2019-09-06 NOTE — NUR
PT SITTING UP IN RECLINER; A/O X3; RESP EVEN AND UNLABORED ON ROOM AIR; TELE
IN PLACE; DOUBLE LUMEN PICC NOTED TO FRANKI, FLUSHED WELL; REDNESS NOTED TO LOWER
ABD; MD AWARE OF REDNESS; GENERALIZED EDEMA AND BRUSING NOTED; C/O OF PAIN
9/10; CALL KLINE IN REACH; SAFETY PRECAUTION REINFORCED;.

## 2019-09-06 NOTE — NUR
PT SITING UP IN RECLINER; D/C INSTRUCTIONS GIVEN TO PT, VERBALIZED
UNDERSTANDING; PT REFUSING HELP WITH DRESSING;

## 2019-09-06 NOTE — NUR
MEDICATED PT PER EMAR; ASSISTED PT TO RESTROOM, PT ENCOURAGE TO CALL FOR
ASSISTANCE; VERBALIZE UNDERSTANDING;

## 2019-09-06 NOTE — NUR
PT SITTING UP IN RECLINER WANTING TO KNOW IF SHE'S BEING D/C TODAY, EXPLAINED
TO PT OF WHY SHE'S NOT LEAVING TODAY; CLEAN GOWN PROVIDED TO PT; CALL BELL IN
REACH;.

## 2019-09-07 NOTE — NUR
PT RESTING IN BED WITH EYES CLOSED. NO S/S OF PAIN OR DISCOMFORT NOTED. NO
APPARENT DISTRESS. CALL LIGHT WITHIN REACH. WILL CONTINUE TO MONITOR.

## 2019-09-07 NOTE — NUR
IV ABT INFUSING WITHOUT DIFFICULTY. NO ADVERSE REACTION NOTED. CALL LIGHT
WITHIN REACH. WILL CONTINUE TO MONITOR.

## 2019-09-07 NOTE — NUR
PT RESTING IN RECLINER. CASE MANAGEMENT IS WORKING ON HOME HEALTH
ARRANGEMENTS. PT OFFERS NO COMPLAINTS.  BELONGINGS ALL PACKED FOR DISCHARGED.
FREQUENT ROUNDS MADE. CALL BELL WITHIN REACH.

## 2019-09-07 NOTE — NUR
REVIEWED ALL DISCHARGE INSTRUCTIONS WITH PT. PT IS SET UP WITH [facility] OUTPT IV
THERAPY TO COME IN ONCE A DAY FOR MERREM IV.  PT TO BE DISCHARGED HOME WITH
HOME HEALTH. TEACHING DONE REGARDING PICC LINE. PT TO BE DISCHARGED WITH PICC
LINE IN PLACE FOR DAILY IV THERAPY.  PICC DRESSING IS CLEAN AND DRY AND BOTH
LUMENS FLUSHED WITHOUT ANY DIFFICULTY.  PICC DRESSING CHANGE DUE ON TUESDAY.
PT AWARE TO CALL PCP FOR FOLLOW UP APPT.  PT INSTRUCTED ON DISCHARGE MEDS. PT
STATES SHE HAS NO QUESTIONS REGARDING D/C ORDERS .  PICC LINE CARD GIVEN TO PT
AND PT PLACED IN HER WALLET. SCRIPT GIVEN TO PT FOR IV MERREM QD. FAXED FACE
SHEET TO IV THERAPY WITH ORDER FOR IV MERREM QD.  PT INSTRUCTED TO CALL IV
THERAPY ABOUT 0800 A.M. TO CONFIRM COMING IN AT 1000 FOR IV THERAPY.  PT
STATES SHE UNDERSTANDS AND HAS NO QUESTIONS.

## 2019-09-07 NOTE — NUR
PT RESTING IN RECLINER. RESP EVEN AND UNLABORED. O2 N/C ON AT 3L. PICC PATENT.
OFFERS NO COMPLAINTS. CALL BELL WITHIN REACH.

## 2019-09-07 NOTE — NUR
DR GARLAND IN TO SPEAK WITH PT ABOUT D/C HOME TODAY WITH HOME HEALTH.  DR GARLAND INFORMED PT HOME HEALTH HAS TO BE ARRANGED AND CASE MANAGEMENT WILL
LOOK INTO HOME HEALTH ADMINISTERING HER ANTIBIOTIC DAILY.  PT DOES HAVE O2 AT
HOME.  POSSIBLE D/C HOME THIS AFTERNOON PER DR GARLAND.

## 2019-09-07 NOTE — NUR
PT DISCHARGED VIA WHEELCHAIR WITH HER . ALL BELONGINGS WITH PT. PT HAS
DISCHARGE INSTRUCTION. PT IS IN STABLE CONDITION AT TIME OF D/C.

## 2019-09-07 NOTE — NUR
PT AWAKE SITTING ON BSC EATING HER BREAKFAST.  DUE TO PT RECEIVING LAXATIVES
YESTERDAY PT IS HAVING FREQUENT BOWEL MOVEMENTS.  RESP EVEN AND UNLABORED. O2
N/C ON AT 3L. LUNGS CLEAR BILAT. ABD SOFT AND NONDISTENDED WITH EXCORIATION
NOTED TO ABD DUE TO RECEIVING HEP S.Q THAT HAS BEEN D/CARLITOS. NO LOWER EXT EDEMA
NOTED. PEDAL PULSES PALPATED BILAT. RT UPPER ARM DUAL LUMEN PICC IS PATENT
WITH DRESSING CLEAN AND DRY.  BOTH LUMENS FLUSHED WITHOUT ANY DIFFICULTY.
MEDICATED WITH ROXICODONE 15MG P.O FOR BACK DISCOMFORT.  BRUISE NOTED TO LEFT
MID BACK AREA AND LEFT KNEE.  FREQUENT ROUNDS MADE. CALL BELL WITHIN REACH.

## 2020-02-18 ENCOUNTER — HOSPITAL ENCOUNTER (EMERGENCY)
Age: 70
Discharge: ANOTHER HOSPITAL | End: 2020-02-18
Payer: MEDICARE

## 2020-02-18 DIAGNOSIS — Z96.651: ICD-10-CM

## 2020-02-18 DIAGNOSIS — J44.9: ICD-10-CM

## 2020-02-18 DIAGNOSIS — M97.11XA: ICD-10-CM
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
## 2020-02-18 DIAGNOSIS — E66.01: ICD-10-CM

## 2020-02-18 DIAGNOSIS — E03.9: ICD-10-CM

## 2020-02-18 DIAGNOSIS — I50.9: ICD-10-CM
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
## 2020-02-18 DIAGNOSIS — S72.491A: Primary | ICD-10-CM

## 2020-02-18 DIAGNOSIS — I25.10: ICD-10-CM

## 2020-02-18 LAB
ALBUMIN SERPL-MCNC: 3.2 G/DL (ref 3.2–5)
ALP SERPL-CCNC: 104 U/L (ref 38–126)
ANION GAP SERPL CALCULATED.3IONS-SCNC: 11 MMOL/L
APTT PPP: 28.4 SECONDS (ref 20–32.5)
AST SERPL-CCNC: 25 U/L (ref 9–36)
BASOPHILS NFR BLD AUTO: 0 % (ref 0–3)
BUN SERPL-MCNC: 16 MG/DL (ref 8–23)
BUN/CREAT SERPL: 25
CHLORIDE SERPL-SCNC: 102 MMOL/L (ref 95–108)
CO2 SERPL-SCNC: 28 MMOL/L (ref 22–30)
CREAT SERPL-MCNC: 0.6 MG/DL (ref 0.5–1)
EOSINOPHIL NFR BLD AUTO: 1 % (ref 0–8)
ERYTHROCYTE [DISTWIDTH] IN BLOOD BY AUTOMATED COUNT: 15.9 % (ref 11.5–15.5)
HCT VFR BLD AUTO: 34.9 % (ref 37–47)
HGB BLD-MCNC: 10.9 G/DL (ref 12–16)
IMM GRANULOCYTES NFR BLD: 0.5 % (ref 0–5)
INR PPP: 1.1 RATIO (ref 0.7–1.3)
LYMPHOCYTES NFR BLD: 5 % (ref 15–41)
MCH RBC QN AUTO: 26.4 PG  CALC (ref 26–32)
MCHC RBC AUTO-ENTMCNC: 31.2 G/L CALC (ref 32–36)
MCV RBC AUTO: 84.5 FL  CALC (ref 80–100)
MONOCYTES NFR BLD AUTO: 6 % (ref 2–13)
NEUTROPHILS # BLD AUTO: 13.9 THOU/UL (ref 2–7.15)
NEUTROPHILS NFR BLD AUTO: 88 % (ref 42–76)
PLATELET # BLD AUTO: 137 THOU/UL (ref 130–400)
POTASSIUM SERPL-SCNC: 4 MMOL/L (ref 3.5–5.1)
PROT SERPL-MCNC: 6.2 G/DL (ref 6.3–8.2)
PROTHROMBIN TIME: 11.3 SECONDS (ref 9–12.5)
RBC # BLD AUTO: 4.13 MILL/UL (ref 4.2–5.6)
SODIUM SERPL-SCNC: 138 MMOL/L (ref 137–146)

## 2020-03-16 ENCOUNTER — HOSPITAL ENCOUNTER (OUTPATIENT)
Dept: HOSPITAL 82 - ED | Age: 70
Setting detail: OBSERVATION
LOS: 2 days | Discharge: PSYCHIATRIC OR SUBSTANCE USE FACILITY | End: 2020-03-18
Attending: INTERNAL MEDICINE | Admitting: INTERNAL MEDICINE
Payer: MEDICARE

## 2020-03-16 VITALS — SYSTOLIC BLOOD PRESSURE: 120 MMHG | DIASTOLIC BLOOD PRESSURE: 64 MMHG

## 2020-03-16 VITALS — DIASTOLIC BLOOD PRESSURE: 49 MMHG | SYSTOLIC BLOOD PRESSURE: 87 MMHG

## 2020-03-16 VITALS — BODY MASS INDEX: 35.7 KG/M2 | HEIGHT: 62 IN | WEIGHT: 194.01 LBS

## 2020-03-16 DIAGNOSIS — I25.10: ICD-10-CM
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
## 2020-03-16 DIAGNOSIS — G47.33: ICD-10-CM
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
## 2020-03-16 DIAGNOSIS — Z96.651: ICD-10-CM

## 2020-03-16 DIAGNOSIS — K21.9: ICD-10-CM

## 2020-03-16 DIAGNOSIS — E66.01: ICD-10-CM

## 2020-03-16 DIAGNOSIS — S72.491D: ICD-10-CM

## 2020-03-16 DIAGNOSIS — M19.90: ICD-10-CM

## 2020-03-16 DIAGNOSIS — M97.11XD: ICD-10-CM

## 2020-03-16 DIAGNOSIS — S70.11XA: Primary | ICD-10-CM

## 2020-03-16 DIAGNOSIS — F41.9: ICD-10-CM

## 2020-03-16 DIAGNOSIS — G47.00: ICD-10-CM

## 2020-03-16 DIAGNOSIS — J44.9: ICD-10-CM

## 2020-03-16 DIAGNOSIS — E03.9: ICD-10-CM

## 2020-03-16 DIAGNOSIS — I10: ICD-10-CM

## 2020-03-16 PROCEDURE — G0378 HOSPITAL OBSERVATION PER HR: HCPCS

## 2020-03-16 NOTE — NUR
PT ARRIVED TO THE FLOOR VIA STRETCHER ACCOMPANIED BY ED STAFF. ALERT AND
ORIENTED. PT TRANSFERRED FROM STRETCHER TO BED. RESPIRATIONS EVEN AND
UNLABORED ON RA. LUNGS SOUND CLEAR. PEDAL PULSES STRONG. PT REPORTS PAIN IS
LESS THAN IT WAS RATING IT A 6/10 STATING THE MEDICATION SHE GOT IN THE ER
HELPED. PT ORIENTED TO ROOM AND CALL BELL SYSTEM. SAFETY PRECAUTIONS IN PLACE.
WILL CONTINUE TO MONITOR.

## 2020-03-16 NOTE — NUR
PT ASSESSED.  AO X 3.  SKIN PINK WARM AND DRY.  PT REPORTS UNABLE TO BEAR
WEIGHT ON RIGHT LEG.  COMPLETED REHAB BUT WAS UNABLE TO GET HERSELF TO THE
COMMODE TODAY BEARING WEIGHT ONLY ON THE LEFT LEG

## 2020-03-17 VITALS — SYSTOLIC BLOOD PRESSURE: 92 MMHG | DIASTOLIC BLOOD PRESSURE: 49 MMHG

## 2020-03-17 VITALS — SYSTOLIC BLOOD PRESSURE: 98 MMHG | DIASTOLIC BLOOD PRESSURE: 52 MMHG

## 2020-03-17 VITALS — DIASTOLIC BLOOD PRESSURE: 86 MMHG | SYSTOLIC BLOOD PRESSURE: 129 MMHG

## 2020-03-17 VITALS — DIASTOLIC BLOOD PRESSURE: 57 MMHG | SYSTOLIC BLOOD PRESSURE: 94 MMHG

## 2020-03-17 VITALS — DIASTOLIC BLOOD PRESSURE: 63 MMHG | SYSTOLIC BLOOD PRESSURE: 109 MMHG

## 2020-03-17 LAB
ANION GAP SERPL CALCULATED.3IONS-SCNC: 5 MMOL/L
BASOPHILS NFR BLD AUTO: 1 % (ref 0–3)
BILIRUB UR QL STRIP.AUTO: NEGATIVE
BUN SERPL-MCNC: 10 MG/DL (ref 8–23)
BUN/CREAT SERPL: 19
CHLORIDE SERPL-SCNC: 107 MMOL/L (ref 95–108)
CO2 SERPL-SCNC: 29 MMOL/L (ref 22–30)
COLOR UR AUTO: YELLOW
CREAT SERPL-MCNC: 0.5 MG/DL (ref 0.5–1)
EOSINOPHIL NFR BLD AUTO: 3 % (ref 0–8)
ERYTHROCYTE [DISTWIDTH] IN BLOOD BY AUTOMATED COUNT: 18.8 % (ref 11.5–15.5)
GLUCOSE UR STRIP.AUTO-MCNC: NEGATIVE MG/DL
HCT VFR BLD AUTO: 31.2 % (ref 37–47)
HGB BLD-MCNC: 9.4 G/DL (ref 12–16)
HGB UR QL STRIP.AUTO: NEGATIVE
IMM GRANULOCYTES NFR BLD: 0.5 % (ref 0–5)
KETONES UR STRIP.AUTO-MCNC: NEGATIVE MG/DL
LEUKOCYTE ESTERASE UR QL STRIP.AUTO: NEGATIVE
LYMPHOCYTES NFR BLD: 24 % (ref 15–41)
MCH RBC QN AUTO: 26 PG  CALC (ref 26–32)
MCHC RBC AUTO-ENTMCNC: 30.1 G/L CALC (ref 32–36)
MCV RBC AUTO: 86.4 FL  CALC (ref 80–100)
MONOCYTES NFR BLD AUTO: 12 % (ref 2–13)
NEUTROPHILS # BLD AUTO: 3.57 THOU/UL (ref 2–7.15)
NEUTROPHILS NFR BLD AUTO: 60 % (ref 42–76)
NITRITE UR QL STRIP.AUTO: NEGATIVE
PH UR STRIP.AUTO: 7 [PH] (ref 4.5–8)
PLATELET # BLD AUTO: 259 THOU/UL (ref 130–400)
POTASSIUM SERPL-SCNC: 3.7 MMOL/L (ref 3.5–5.1)
PROT UR QL STRIP.AUTO: NEGATIVE MG/DL
RBC # BLD AUTO: 3.61 MILL/UL (ref 4.2–5.6)
SODIUM SERPL-SCNC: 139 MMOL/L (ref 137–146)
SP GR UR STRIP.AUTO: 1.01
UROBILINOGEN UR QL STRIP.AUTO: 4 E.U./DL

## 2020-03-17 NOTE — NUR
PT AS BEFORE, NEEDED BEDPAN EARLIER TODAY, LARGE BM.  PT HELPFUL IN TURNING
HERSELF TO SIDE.  NO EVIDENCE OF DISTRESS, OCCASIONAL PAIN MED REQUEST.

## 2020-03-17 NOTE — NUR
REPORT RECEIVED FROM ALLEY. PT RESTING IN BED. NO S/S OF DISTRESS AT THIS TIME.
SAFETY PRECAUTIONS IN PLACE. WILL CONTINUE TO MONITOR.

## 2020-03-17 NOTE — NUR
PT RESTING IN BED. RESPIRATIONS EVEN AND UNLABORED. SAFETY PRECAUTIONS IN
PLACE. WILL CONTINUE TO MONITOR.

## 2020-03-17 NOTE — NUR
PT ALERT AND ORIENTED. RESPIRATIONS EVEN AND UNLABORED ON RA. VS OBTAINED AND
ASSESSMENT COMPLETED. LUNGS SOUND CLEAR. PEDAL PULSES ARE STRONG. PT ASSISTED
TO THE BSC AND BACK INTO BED. PT DENIES ANY FURTHER NEEDS AT THIS TIME. SAFETY
PRECAUTIONS IN PLACE. WILL CONTINUE TO MONITOR.

## 2020-03-17 NOTE — NUR
PT SEEN AWAKE, ALERT, ORIENTED X 3.  PT IS NON WEIGHT-BEARING TO RLE,
COMPLIANT WITH SAME.  PT TO BSC TODAY AS NEEDED WITH 2 PERSON ASSIST.  PT
MEDICATED FOR PAIN AS NEEDED.

## 2020-03-17 NOTE — NUR
PT RESTING IN BED, NO S/S OF DISTRESS AT THIS TIME. SAFETY PRECAUTIONS IN
PLACE. WILL CONTINUE TO MONITOR.

## 2020-03-18 VITALS — SYSTOLIC BLOOD PRESSURE: 118 MMHG | DIASTOLIC BLOOD PRESSURE: 70 MMHG

## 2020-03-18 NOTE — NUR
Discharge info given and reviewed with and she verbalize understanding.
IV D/C and end of cath intact. No New skin, Patient did not verbalize pain.
[facility] transport picked patient up via strecther
No issues at time of discharge but patient was concern about home she is going
to move around. Patient stated she could not afford Rehab and her  will
help her if needed.

## 2020-03-18 NOTE — NUR
PT RESTING IN BED, RESPIRATIONS EVEN AND UNLABORED ON O2 @ 2L VIA NC. NO S.S
OF DISTRESS AT THIS TIME. WILL CONTINUE TO MONITOR.

## 2020-03-18 NOTE — NUR
PATIENT PERFORMED LE STRENGTHENING EXERCISES IN SUPINE POSITION DONE FOR 10
REPS X 1 SET ON EACH LE. EXERCISES INCLUDE: SLR, HIP ABDUCTION, HEEL SLIDES.
ANKLE PUMPS X 20 REPS, QUADS MUSCLE SETTING X 6 SECS HOLD X 10 REPS X 1 SET.
TRANSFER/BALANCE TRAINING: SIT-TO-STAND AND STADING AS TOLERATED USING WALKER.
PATIENT TOLERATED 2 MINS OF STANDING, RLE NWB. PT INSTRUCTED PATIENT TO
PERFORM ALL LE STRENGTHENING EXERCISES 2X/DAY.
 
AMPAC = 14

## 2020-03-18 NOTE — NUR
Patient assesed from head to toe completed. Lung sounds clear. ABD soft and
nontender. Patient did expressed pain in lower back reated as a 7. No skin
issues not. No distress noted. will medicate patient for pain and contiune to
monitor patient.

## 2020-03-20 ENCOUNTER — HOSPITAL ENCOUNTER (OUTPATIENT)
Dept: HOSPITAL 82 - ED | Age: 70
Setting detail: OBSERVATION
LOS: 1 days | Discharge: PSYCHIATRIC OR SUBSTANCE USE FACILITY | End: 2020-03-21
Attending: INTERNAL MEDICINE | Admitting: INTERNAL MEDICINE
Payer: MEDICARE

## 2020-03-20 VITALS — HEIGHT: 62 IN | WEIGHT: 194.89 LBS | BODY MASS INDEX: 35.86 KG/M2

## 2020-03-20 VITALS — DIASTOLIC BLOOD PRESSURE: 73 MMHG | SYSTOLIC BLOOD PRESSURE: 131 MMHG

## 2020-03-20 DIAGNOSIS — J44.9: ICD-10-CM

## 2020-03-20 DIAGNOSIS — N39.0: ICD-10-CM

## 2020-03-20 DIAGNOSIS — R11.2: Primary | ICD-10-CM

## 2020-03-20 DIAGNOSIS — E87.6: ICD-10-CM

## 2020-03-20 DIAGNOSIS — F32.9: ICD-10-CM

## 2020-03-20 DIAGNOSIS — F41.9: ICD-10-CM

## 2020-03-20 DIAGNOSIS — E66.01: ICD-10-CM

## 2020-03-20 DIAGNOSIS — K21.9: ICD-10-CM

## 2020-03-20 DIAGNOSIS — I25.10: ICD-10-CM

## 2020-03-20 DIAGNOSIS — Z87.440: ICD-10-CM

## 2020-03-20 DIAGNOSIS — M54.5: ICD-10-CM

## 2020-03-20 DIAGNOSIS — Z87.11: ICD-10-CM

## 2020-03-20 DIAGNOSIS — B96.20: ICD-10-CM

## 2020-03-20 DIAGNOSIS — G89.29: ICD-10-CM

## 2020-03-20 DIAGNOSIS — E03.9: ICD-10-CM

## 2020-03-20 DIAGNOSIS — I10: ICD-10-CM

## 2020-03-20 LAB
ALBUMIN SERPL-MCNC: 3.1 G/DL (ref 3.2–5)
ALP SERPL-CCNC: 134 U/L (ref 38–126)
ANION GAP SERPL CALCULATED.3IONS-SCNC: 7 MMOL/L
AST SERPL-CCNC: 21 U/L (ref 9–36)
BACTERIA #/AREA URNS HPF: (no result) HPF
BASOPHILS NFR BLD AUTO: 0 % (ref 0–3)
BASOPHILS NFR BLD AUTO: 0 % (ref 0–3)
BILIRUB UR QL STRIP.AUTO: NEGATIVE
BUN SERPL-MCNC: 8 MG/DL (ref 8–23)
BUN/CREAT SERPL: 14
CHLORIDE SERPL-SCNC: 105 MMOL/L (ref 95–108)
CO2 SERPL-SCNC: 29 MMOL/L (ref 22–30)
COLOR UR AUTO: YELLOW
CREAT SERPL-MCNC: 0.6 MG/DL (ref 0.5–1)
EOSINOPHIL NFR BLD AUTO: 0 % (ref 0–8)
EOSINOPHIL NFR BLD AUTO: 0 % (ref 0–8)
ERYTHROCYTE [DISTWIDTH] IN BLOOD BY AUTOMATED COUNT: 18.6 % (ref 11.5–15.5)
ERYTHROCYTE [DISTWIDTH] IN BLOOD BY AUTOMATED COUNT: 18.7 % (ref 11.5–15.5)
GLUCOSE UR STRIP.AUTO-MCNC: NEGATIVE MG/DL
HCT VFR BLD AUTO: 34.1 % (ref 37–47)
HCT VFR BLD AUTO: 35.8 % (ref 37–47)
HGB BLD-MCNC: 10.5 G/DL (ref 12–16)
HGB BLD-MCNC: 11 G/DL (ref 12–16)
HGB UR QL STRIP.AUTO: (no result)
IMM GRANULOCYTES NFR BLD: 0.4 % (ref 0–5)
IMM GRANULOCYTES NFR BLD: 0.6 % (ref 0–5)
KETONES UR STRIP.AUTO-MCNC: NEGATIVE MG/DL
LEUKOCYTE ESTERASE UR QL STRIP.AUTO: (no result)
LIPASE SERPL-CCNC: 29 U/L (ref 23–300)
LYMPHOCYTES NFR BLD: 3 % (ref 15–41)
LYMPHOCYTES NFR BLD: 6 % (ref 15–41)
MCH RBC QN AUTO: 26.1 PG  CALC (ref 26–32)
MCH RBC QN AUTO: 26.4 PG  CALC (ref 26–32)
MCHC RBC AUTO-ENTMCNC: 30.7 G/DL CAL (ref 32–36)
MCHC RBC AUTO-ENTMCNC: 30.8 G/DL CAL (ref 32–36)
MCV RBC AUTO: 84.8 FL  CALC (ref 80–100)
MCV RBC AUTO: 85.9 FL  CALC (ref 80–100)
MONOCYTES NFR BLD AUTO: 5 % (ref 2–13)
MONOCYTES NFR BLD AUTO: 6 % (ref 2–13)
NEUTROPHILS # BLD AUTO: 13.6 THOU/UL (ref 2–7.15)
NEUTROPHILS # BLD AUTO: 16.48 THOU/UL (ref 2–7.15)
NEUTROPHILS NFR BLD AUTO: 88 % (ref 42–76)
NEUTROPHILS NFR BLD AUTO: 91 % (ref 42–76)
NITRITE UR QL STRIP.AUTO: NEGATIVE
PH UR STRIP.AUTO: 6.5 [PH] (ref 4.5–8)
PLATELET # BLD AUTO: 184 THOU/UL (ref 130–400)
PLATELET # BLD AUTO: 198 THOU/UL (ref 130–400)
POTASSIUM SERPL-SCNC: 3.3 MMOL/L (ref 3.5–5.1)
PROT SERPL-MCNC: 6.7 G/DL (ref 6.3–8.2)
PROT UR QL STRIP.AUTO: NEGATIVE MG/DL
RBC # BLD AUTO: 3.97 MILL/UL (ref 4.2–5.6)
RBC # BLD AUTO: 4.22 MILL/UL (ref 4.2–5.6)
RBC #/AREA URNS HPF: (no result) RBC/HPF (ref 0–5)
SODIUM SERPL-SCNC: 138 MMOL/L (ref 137–146)
SP GR UR STRIP.AUTO: 1.01
SQUAMOUS URNS QL MICRO: (no result) EPI/HPF
UROBILINOGEN UR QL STRIP.AUTO: 1 E.U./DL
WBC #/AREA URNS HPF: (no result) WBC/HPF (ref 0–5)

## 2020-03-20 PROCEDURE — G0378 HOSPITAL OBSERVATION PER HR: HCPCS

## 2020-03-20 NOTE — NUR
MD IN ROOM TO DISCUSS CLINICAL FINDINGS WITH PT. VERBALIZEWD UNDERSTANDING.
ALSO MAKE HERE AWARE OF ADMISSION.

## 2020-03-20 NOTE — NUR
Admission Note
 
Report Given to:         LEONARDO SUNSHINE
Transported by:           Wheelchair          X Stretcher
 
Transported with:        X Nurse     Transporter   X Patent IV    O2
                          Cardiac Monitor
 
Location:                 ICU      X MS2

## 2020-03-20 NOTE — NUR
PT STATES SHE WAS D/C RECENTLY FOR FROM HOSPITAL FOR UTI TREATMENT AND STATE
SHE HAS BEEN NAUSEOUS WITH DIARRHEA SINCE LEAVING.

## 2020-03-21 VITALS — SYSTOLIC BLOOD PRESSURE: 113 MMHG | DIASTOLIC BLOOD PRESSURE: 63 MMHG

## 2020-03-21 VITALS — DIASTOLIC BLOOD PRESSURE: 66 MMHG | SYSTOLIC BLOOD PRESSURE: 122 MMHG

## 2020-03-21 VITALS — SYSTOLIC BLOOD PRESSURE: 122 MMHG | DIASTOLIC BLOOD PRESSURE: 66 MMHG

## 2020-03-21 LAB
ALBUMIN SERPL-MCNC: 2.5 G/DL (ref 3.2–5)
ALP SERPL-CCNC: 105 U/L (ref 38–126)
ANION GAP SERPL CALCULATED.3IONS-SCNC: 8 MMOL/L
AST SERPL-CCNC: 17 U/L (ref 9–36)
BUN SERPL-MCNC: 7 MG/DL (ref 8–23)
BUN/CREAT SERPL: 14
CHLORIDE SERPL-SCNC: 105 MMOL/L (ref 95–108)
CO2 SERPL-SCNC: 28 MMOL/L (ref 22–30)
CREAT SERPL-MCNC: 0.5 MG/DL (ref 0.5–1)
POTASSIUM SERPL-SCNC: 3.3 MMOL/L (ref 3.5–5.1)
PROT SERPL-MCNC: 5.5 G/DL (ref 6.3–8.2)
SODIUM SERPL-SCNC: 137 MMOL/L (ref 137–146)

## 2020-03-21 NOTE — NUR
PT MEDICATED FOR SLEEP. DENIES ANY OTHER NEEDS. ASKING FOR XANAX, BUT I
EXPLAINED TO HER THAT SHE CANNOT HAVE PAIN, SLEEP AND ANXIETY PILLS ALL AT ONE
TIME. PT APPEARS RELAXED IN THE BED WITH THE TV ON.

## 2020-03-21 NOTE — NUR
PT MEDICATED AS ORDERS PROVIDE AND FOR ANXIETY PER REQUEST. PT APPEARED
RELAXED, LIGHTS OFF, TV ON LOW AND EYES CLOSED, BUT AS SOON AS I ENTERED SHE
REPORTED BEING SHAKY AND "NEEDING HER XANAX." PROVIDED AS ORDERS ALLOW.

## 2020-03-21 NOTE — NUR
PT MEDICATED AS ORDERS PROVIDE. PROVIDED SNACK/GINGERALE PER REQUEST. NO S/O
DISTRESS. PT DENIES N/V AT THIS TIME. LIGHTS ON LOW AND PT WATCHING TV.

## 2020-03-21 NOTE — NUR
PT RESTING IN BED, NO SIGNS OF DISTRESS NOTED, RESP EVEN AND UNLABORED. PT
ALERT AND ORIENTED X3, DISCUSSED POC, PT TEARFUL STATES SHE DOES NOT WANT TO
GO HOME, ASSESSMENT COMPLETED, CALL LIGHT IN REACH,CONTINUE TO MONITOR.

## 2020-05-28 NOTE — NUR
PATIENT RESTING IN BED-HS MEDS GIVEN AS ORDERED.  MEDICATED FOR POST-OP RIGHT
KNEE PAIN WITH PERCOCET 10/325MG PO TABS 2.  PATIENT CONT TO BE DROWSEY MOST
OF THE TIME.  CALL LIGHT IN REACH.  WILL CONT TO MONITOR. - - -

## 2020-08-20 ENCOUNTER — HOSPITAL ENCOUNTER (OUTPATIENT)
Dept: HOSPITAL 82 - ED | Age: 70
Setting detail: OBSERVATION
LOS: 1 days | Discharge: HOME HEALTH | End: 2020-08-21
Attending: INTERNAL MEDICINE | Admitting: INTERNAL MEDICINE
Payer: MEDICARE

## 2020-08-20 VITALS — HEIGHT: 62 IN | BODY MASS INDEX: 38.14 KG/M2 | WEIGHT: 207.23 LBS

## 2020-08-20 VITALS — SYSTOLIC BLOOD PRESSURE: 134 MMHG | DIASTOLIC BLOOD PRESSURE: 71 MMHG

## 2020-08-20 DIAGNOSIS — R00.1: ICD-10-CM

## 2020-08-20 DIAGNOSIS — M25.551: ICD-10-CM

## 2020-08-20 DIAGNOSIS — I95.9: Primary | ICD-10-CM

## 2020-08-20 DIAGNOSIS — Z87.11: ICD-10-CM

## 2020-08-20 DIAGNOSIS — J44.9: ICD-10-CM

## 2020-08-20 DIAGNOSIS — Z98.84: ICD-10-CM

## 2020-08-20 DIAGNOSIS — E03.9: ICD-10-CM

## 2020-08-20 DIAGNOSIS — Z96.653: ICD-10-CM

## 2020-08-20 DIAGNOSIS — I25.10: ICD-10-CM

## 2020-08-20 DIAGNOSIS — E66.01: ICD-10-CM

## 2020-08-20 DIAGNOSIS — I48.0: ICD-10-CM

## 2020-08-20 DIAGNOSIS — Z11.59: ICD-10-CM

## 2020-08-20 DIAGNOSIS — I11.0: ICD-10-CM

## 2020-08-20 DIAGNOSIS — K21.9: ICD-10-CM

## 2020-08-20 DIAGNOSIS — Z87.440: ICD-10-CM

## 2020-08-20 DIAGNOSIS — I50.9: ICD-10-CM

## 2020-08-20 DIAGNOSIS — Z79.01: ICD-10-CM

## 2020-08-20 LAB
ALBUMIN SERPL-MCNC: 3.4 G/DL (ref 3.2–5)
ALP SERPL-CCNC: 92 U/L (ref 38–126)
ANION GAP SERPL CALCULATED.3IONS-SCNC: 7 MMOL/L
AST SERPL-CCNC: 22 U/L (ref 9–36)
BASOPHILS NFR BLD AUTO: 1 % (ref 0–3)
BILIRUB UR QL STRIP.AUTO: NEGATIVE
BUN SERPL-MCNC: 15 MG/DL (ref 8–23)
BUN/CREAT SERPL: 19
CAOX CRY URNS QL MICRO: (no result) LPF
CHLORIDE SERPL-SCNC: 101 MMOL/L (ref 95–108)
CO2 SERPL-SCNC: 32 MMOL/L (ref 22–30)
COLOR UR AUTO: YELLOW
CREAT SERPL-MCNC: 0.8 MG/DL (ref 0.5–1)
EOSINOPHIL NFR BLD AUTO: 6 % (ref 0–8)
ERYTHROCYTE [DISTWIDTH] IN BLOOD BY AUTOMATED COUNT: 16.4 % (ref 11.5–15.5)
GLUCOSE UR STRIP.AUTO-MCNC: NEGATIVE MG/DL
HCT VFR BLD AUTO: 36.2 % (ref 37–47)
HGB BLD-MCNC: 10.9 G/DL (ref 12–16)
HGB UR QL STRIP.AUTO: (no result)
IMM GRANULOCYTES NFR BLD: 0.2 % (ref 0–5)
KETONES UR STRIP.AUTO-MCNC: NEGATIVE MG/DL
LEUKOCYTE ESTERASE UR QL STRIP.AUTO: (no result)
LYMPHOCYTES NFR BLD: 31 % (ref 15–41)
MCH RBC QN AUTO: 25.9 PG  CALC (ref 26–32)
MCHC RBC AUTO-ENTMCNC: 30.1 G/DL CAL (ref 32–36)
MCV RBC AUTO: 86 FL  CALC (ref 80–100)
MONOCYTES NFR BLD AUTO: 21 % (ref 2–13)
MYOGLOBIN SERPL-MCNC: 27 NG/ML (ref 0–62)
NEUTROPHILS # BLD AUTO: 1.67 THOU/UL (ref 2–7.15)
NEUTROPHILS NFR BLD AUTO: 41 % (ref 42–76)
NITRITE UR QL STRIP.AUTO: NEGATIVE
PH UR STRIP.AUTO: 6 [PH] (ref 4.5–8)
PLATELET # BLD AUTO: 116 THOU/UL (ref 130–400)
POTASSIUM SERPL-SCNC: 4.1 MMOL/L (ref 3.5–5.1)
PROT SERPL-MCNC: 6.7 G/DL (ref 6.3–8.2)
PROT UR QL STRIP.AUTO: NEGATIVE MG/DL
RBC # BLD AUTO: 4.21 MILL/UL (ref 4.2–5.6)
RBC #/AREA URNS HPF: (no result) RBC/HPF (ref 0–5)
SODIUM SERPL-SCNC: 136 MMOL/L (ref 137–146)
SP GR UR STRIP.AUTO: >=1.03
SQUAMOUS URNS QL MICRO: (no result) EPI/HPF
UROBILINOGEN UR QL STRIP.AUTO: 1 E.U./DL
WBC #/AREA URNS HPF: (no result) WBC/HPF (ref 0–5)

## 2020-08-20 PROCEDURE — G0378 HOSPITAL OBSERVATION PER HR: HCPCS

## 2020-08-21 VITALS — SYSTOLIC BLOOD PRESSURE: 115 MMHG | DIASTOLIC BLOOD PRESSURE: 59 MMHG

## 2020-08-21 VITALS — SYSTOLIC BLOOD PRESSURE: 121 MMHG | DIASTOLIC BLOOD PRESSURE: 71 MMHG

## 2020-08-21 VITALS — SYSTOLIC BLOOD PRESSURE: 109 MMHG | DIASTOLIC BLOOD PRESSURE: 80 MMHG

## 2020-12-05 ENCOUNTER — HOSPITAL ENCOUNTER (EMERGENCY)
Dept: HOSPITAL 82 - ED | Age: 70
Discharge: HOME | End: 2020-12-05
Payer: MEDICARE

## 2020-12-05 VITALS — SYSTOLIC BLOOD PRESSURE: 108 MMHG | DIASTOLIC BLOOD PRESSURE: 64 MMHG

## 2020-12-05 VITALS — WEIGHT: 220.46 LBS | HEIGHT: 62 IN | BODY MASS INDEX: 40.57 KG/M2

## 2020-12-05 DIAGNOSIS — M25.561: ICD-10-CM

## 2020-12-05 DIAGNOSIS — J11.1: Primary | ICD-10-CM

## 2020-12-05 DIAGNOSIS — I25.10: ICD-10-CM

## 2020-12-05 DIAGNOSIS — Z20.828: ICD-10-CM

## 2020-12-05 DIAGNOSIS — Z87.11: ICD-10-CM

## 2020-12-05 DIAGNOSIS — Z96.651: ICD-10-CM

## 2020-12-05 DIAGNOSIS — F32.9: ICD-10-CM

## 2020-12-05 DIAGNOSIS — E03.9: ICD-10-CM

## 2020-12-05 DIAGNOSIS — I50.9: ICD-10-CM

## 2020-12-05 DIAGNOSIS — Z98.84: ICD-10-CM

## 2020-12-05 DIAGNOSIS — N39.0: ICD-10-CM

## 2020-12-05 DIAGNOSIS — K21.9: ICD-10-CM

## 2020-12-05 DIAGNOSIS — J44.9: ICD-10-CM

## 2020-12-05 DIAGNOSIS — E66.01: ICD-10-CM

## 2020-12-05 LAB
ALBUMIN SERPL-MCNC: 3.7 G/DL (ref 3.2–5)
ALP SERPL-CCNC: 136 U/L (ref 38–126)
ANION GAP SERPL CALCULATED.3IONS-SCNC: 10 MMOL/L
AST SERPL-CCNC: 27 U/L (ref 9–36)
BASOPHILS NFR BLD AUTO: 0 % (ref 0–3)
BILIRUB UR QL STRIP.AUTO: NEGATIVE
BUN SERPL-MCNC: 18 MG/DL (ref 8–23)
BUN/CREAT SERPL: 18
CHLORIDE SERPL-SCNC: 101 MMOL/L (ref 95–108)
CO2 SERPL-SCNC: 33 MMOL/L (ref 22–30)
COLOR UR AUTO: YELLOW
CREAT SERPL-MCNC: 1 MG/DL (ref 0.5–1)
EOSINOPHIL NFR BLD AUTO: 1 % (ref 0–8)
ERYTHROCYTE [DISTWIDTH] IN BLOOD BY AUTOMATED COUNT: 15.2 % (ref 11.5–15.5)
GLUCOSE UR STRIP.AUTO-MCNC: NEGATIVE MG/DL
HCT VFR BLD AUTO: 42.2 % (ref 37–47)
HGB BLD-MCNC: 13.3 G/DL (ref 12–16)
HGB UR QL STRIP.AUTO: (no result)
IMM GRANULOCYTES NFR BLD: 0.1 % (ref 0–5)
KETONES UR STRIP.AUTO-MCNC: NEGATIVE MG/DL
LEUKOCYTE ESTERASE UR QL STRIP.AUTO: NEGATIVE
LYMPHOCYTES NFR BLD: 6 % (ref 15–41)
MCH RBC QN AUTO: 28.1 PG  CALC (ref 26–32)
MCHC RBC AUTO-ENTMCNC: 31.5 G/DL CAL (ref 32–36)
MCV RBC AUTO: 89.2 FL  CALC (ref 80–100)
MONOCYTES NFR BLD AUTO: 9 % (ref 2–13)
MYOGLOBIN SERPL-MCNC: 58 NG/ML (ref 0–62)
NEUTROPHILS # BLD AUTO: 5.77 THOU/UL (ref 2–7.15)
NEUTROPHILS NFR BLD AUTO: 83 % (ref 42–76)
NITRITE UR QL STRIP.AUTO: NEGATIVE
PH UR STRIP.AUTO: 6 [PH] (ref 4.5–8)
PLATELET # BLD AUTO: 94 THOU/UL (ref 130–400)
POTASSIUM SERPL-SCNC: 4.2 MMOL/L (ref 3.5–5.1)
PROT SERPL-MCNC: 7.3 G/DL (ref 6.3–8.2)
PROT UR QL STRIP.AUTO: NEGATIVE MG/DL
RBC # BLD AUTO: 4.73 MILL/UL (ref 4.2–5.6)
RBC #/AREA URNS HPF: (no result) RBC/HPF (ref 0–5)
SODIUM SERPL-SCNC: 140 MMOL/L (ref 137–146)
SP GR UR STRIP.AUTO: >=1.03
SQUAMOUS URNS QL MICRO: (no result) EPI/HPF
UROBILINOGEN UR QL STRIP.AUTO: 0.2 E.U./DL
WBC #/AREA URNS HPF: (no result) WBC/HPF (ref 0–5)

## 2021-03-16 ENCOUNTER — HOSPITAL ENCOUNTER (OUTPATIENT)
Dept: HOSPITAL 82 - ED | Age: 71
Setting detail: OBSERVATION
LOS: 3 days | Discharge: HOME HEALTH | End: 2021-03-19
Attending: INTERNAL MEDICINE | Admitting: INTERNAL MEDICINE
Payer: MEDICARE

## 2021-03-16 VITALS — SYSTOLIC BLOOD PRESSURE: 72 MMHG | DIASTOLIC BLOOD PRESSURE: 44 MMHG

## 2021-03-16 VITALS — WEIGHT: 205 LBS | HEIGHT: 62 IN | BODY MASS INDEX: 37.73 KG/M2

## 2021-03-16 VITALS — DIASTOLIC BLOOD PRESSURE: 56 MMHG | SYSTOLIC BLOOD PRESSURE: 78 MMHG

## 2021-03-16 VITALS — DIASTOLIC BLOOD PRESSURE: 49 MMHG | SYSTOLIC BLOOD PRESSURE: 82 MMHG

## 2021-03-16 VITALS — DIASTOLIC BLOOD PRESSURE: 51 MMHG | SYSTOLIC BLOOD PRESSURE: 84 MMHG

## 2021-03-16 VITALS — DIASTOLIC BLOOD PRESSURE: 52 MMHG | SYSTOLIC BLOOD PRESSURE: 90 MMHG

## 2021-03-16 VITALS — DIASTOLIC BLOOD PRESSURE: 57 MMHG | SYSTOLIC BLOOD PRESSURE: 94 MMHG

## 2021-03-16 VITALS — DIASTOLIC BLOOD PRESSURE: 57 MMHG | SYSTOLIC BLOOD PRESSURE: 89 MMHG

## 2021-03-16 VITALS — DIASTOLIC BLOOD PRESSURE: 54 MMHG | SYSTOLIC BLOOD PRESSURE: 98 MMHG

## 2021-03-16 DIAGNOSIS — Z79.01: ICD-10-CM

## 2021-03-16 DIAGNOSIS — T40.2X5A: ICD-10-CM

## 2021-03-16 DIAGNOSIS — G62.9: ICD-10-CM

## 2021-03-16 DIAGNOSIS — I50.32: ICD-10-CM

## 2021-03-16 DIAGNOSIS — Z98.84: ICD-10-CM

## 2021-03-16 DIAGNOSIS — S82.91XD: ICD-10-CM

## 2021-03-16 DIAGNOSIS — Z87.440: ICD-10-CM

## 2021-03-16 DIAGNOSIS — Z87.11: ICD-10-CM

## 2021-03-16 DIAGNOSIS — E16.2: ICD-10-CM

## 2021-03-16 DIAGNOSIS — J44.9: ICD-10-CM

## 2021-03-16 DIAGNOSIS — G92: ICD-10-CM

## 2021-03-16 DIAGNOSIS — K21.9: ICD-10-CM

## 2021-03-16 DIAGNOSIS — I48.91: ICD-10-CM
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
## 2021-03-16 DIAGNOSIS — I11.0: ICD-10-CM

## 2021-03-16 DIAGNOSIS — E66.01: ICD-10-CM

## 2021-03-16 DIAGNOSIS — I25.10: ICD-10-CM

## 2021-03-16 DIAGNOSIS — Z20.822: ICD-10-CM

## 2021-03-16 DIAGNOSIS — M54.9: ICD-10-CM

## 2021-03-16 DIAGNOSIS — F32.9: ICD-10-CM

## 2021-03-16 DIAGNOSIS — G89.29: ICD-10-CM

## 2021-03-16 DIAGNOSIS — E03.9: ICD-10-CM

## 2021-03-16 DIAGNOSIS — I95.2: Primary | ICD-10-CM

## 2021-03-16 LAB
ALBUMIN SERPL-MCNC: 3.5 G/DL (ref 3.2–5)
ALP SERPL-CCNC: 128 U/L (ref 38–126)
ANION GAP SERPL CALCULATED.3IONS-SCNC: 12 MMOL/L
APTT PPP: 30.8 SECONDS (ref 20–32.5)
AST SERPL-CCNC: 30 U/L (ref 9–36)
BASOPHILS NFR BLD AUTO: 0 % (ref 0–3)
BILIRUB UR QL STRIP.AUTO: NEGATIVE
BUN SERPL-MCNC: 27 MG/DL (ref 8–23)
BUN/CREAT SERPL: 18
CHLORIDE SERPL-SCNC: 99 MMOL/L (ref 95–108)
CO2 SERPL-SCNC: 31 MMOL/L (ref 22–30)
COLOR UR AUTO: YELLOW
CREAT SERPL-MCNC: 1.5 MG/DL (ref 0.5–1)
D DIMER PPP FEU-MCNC: 3.61 MG/L (ref 0.19–0.6)
EOSINOPHIL NFR BLD AUTO: 2 % (ref 0–8)
ERYTHROCYTE [DISTWIDTH] IN BLOOD BY AUTOMATED COUNT: 16.1 % (ref 11.5–15.5)
ETHANOL SERPL-MCNC: 0 MG/DL (ref 0–30)
GLUCOSE UR STRIP.AUTO-MCNC: NEGATIVE MG/DL
HCT VFR BLD AUTO: 38.2 % (ref 37–47)
HGB BLD-MCNC: 11.5 G/DL (ref 12–16)
HGB UR QL STRIP.AUTO: NEGATIVE
IMM GRANULOCYTES NFR BLD: 0.9 % (ref 0–5)
INR PPP: 1.2 RATIO (ref 0.7–1.3)
KETONES UR STRIP.AUTO-MCNC: NEGATIVE MG/DL
LEUKOCYTE ESTERASE UR QL STRIP.AUTO: NEGATIVE
LIPASE SERPL-CCNC: 24 U/L (ref 23–300)
LYMPHOCYTES NFR BLD: 34 % (ref 15–41)
MAGNESIUM SERPL-MCNC: 2 MG/DL (ref 1.6–2.3)
MCH RBC QN AUTO: 26.2 PG  CALC (ref 26–32)
MCHC RBC AUTO-ENTMCNC: 30.1 G/DL CAL (ref 32–36)
MCV RBC AUTO: 87 FL  CALC (ref 80–100)
MONOCYTES NFR BLD AUTO: 12 % (ref 2–13)
NEUTROPHILS # BLD AUTO: 3.38 THOU/UL (ref 2–7.15)
NEUTROPHILS NFR BLD AUTO: 50 % (ref 42–76)
NITRITE UR QL STRIP.AUTO: NEGATIVE
PH UR STRIP.AUTO: 5 [PH] (ref 4.5–8)
PLATELET # BLD AUTO: 164 THOU/UL (ref 130–400)
POTASSIUM SERPL-SCNC: 3.8 MMOL/L (ref 3.5–5.1)
PROT SERPL-MCNC: 7.1 G/DL (ref 6.3–8.2)
PROT UR QL STRIP.AUTO: NEGATIVE MG/DL
PROTHROMBIN TIME: 12.4 SECONDS (ref 9–12.5)
RBC # BLD AUTO: 4.39 MILL/UL (ref 4.2–5.6)
SODIUM SERPL-SCNC: 137 MMOL/L (ref 137–146)
SP GR UR STRIP.AUTO: 1.02
TSH SERPL DL<=0.05 MIU/L-ACNC: 1.03 UIU/ML (ref 0.47–4.68)
UROBILINOGEN UR QL STRIP.AUTO: 0.2 E.U./DL

## 2021-03-16 NOTE — NUR
PT TRANSPORTED FOR ADMISSION TO ICU, REPORT WAS PREVIOUSLY GIVEN BY JONG LUNA TO EVIE RN IN ICU. PT WAS TRANSPORTED VIA STRETCHER WITH CARDIAC
MONITORING AND PORTABLE OXYGEN @ 4L VIA NC. PT WAS TRANSFERRED TO ICU BED.

## 2021-03-16 NOTE — NUR
PT TO ROOM VIA EMS TRANSFER TO STRETCHER VIA STAFF/EMS Patient has decided she would like to do progesterone only OCP instead. Pharmacy queued. Alesha Guthrie RN

## 2021-03-16 NOTE — NUR
IN SUPINE POSITION, ALERT AND ORIENTED X3. RESPS EVEN AND UNLABORED ON O2 VIA
NC. PT REQUESTING LUNCH. MD NOTIFIED.

## 2021-03-16 NOTE — NUR
PT RESTING COMFORTABLY, REQUEST TO CALL HER  TO GIVE HIM AN UPDATE.
VSS, AFEBRILE, SB-SR ON MONITOR.  PT DENIES DISCOMFORT AT THIS TIME, AND HAS
NO OTHER NEEDS.

## 2021-03-16 NOTE — NUR
PT ARRIVED TO FLOOR VIA STRETCHER AT 1915
TRANSFERRED TO BED IN ROOM ICU6.  PATIENT
IS ALERT/ORIENTEDX4, ON 2L NC, SHE IS CURRENTLY BEDBOUD WITH A HARD CAST ON
RLE DUE TO A FRACTURE.  PUREWICK CATHETER IN PLACE, DRAINING CLEAR YELLOW
URINE.  PATIENT STATES PAIN IS COMFORTABLE 5/10 AT THIS TIME, NO SOB OR
DISTRESS OBSERVED.

## 2021-03-16 NOTE — NUR
PT SLEEPING AT THIS TIME. AWOKE PATIENT TO ADJUST BP CUFF.  PATIENT REMAINS
HYPOTENSIVE, SB-SR ON MONITOR WITH RR 9-13.  PT DENIES PAIN AT THIS TIME.  NS
INFUSING AT 75ML/HR.

## 2021-03-17 VITALS — SYSTOLIC BLOOD PRESSURE: 120 MMHG | DIASTOLIC BLOOD PRESSURE: 77 MMHG

## 2021-03-17 VITALS — SYSTOLIC BLOOD PRESSURE: 109 MMHG | DIASTOLIC BLOOD PRESSURE: 80 MMHG

## 2021-03-17 VITALS — SYSTOLIC BLOOD PRESSURE: 107 MMHG | DIASTOLIC BLOOD PRESSURE: 64 MMHG

## 2021-03-17 VITALS — SYSTOLIC BLOOD PRESSURE: 121 MMHG | DIASTOLIC BLOOD PRESSURE: 59 MMHG

## 2021-03-17 VITALS — SYSTOLIC BLOOD PRESSURE: 108 MMHG | DIASTOLIC BLOOD PRESSURE: 54 MMHG

## 2021-03-17 VITALS — SYSTOLIC BLOOD PRESSURE: 105 MMHG | DIASTOLIC BLOOD PRESSURE: 63 MMHG

## 2021-03-17 VITALS — DIASTOLIC BLOOD PRESSURE: 58 MMHG | SYSTOLIC BLOOD PRESSURE: 105 MMHG

## 2021-03-17 VITALS — DIASTOLIC BLOOD PRESSURE: 63 MMHG | SYSTOLIC BLOOD PRESSURE: 118 MMHG

## 2021-03-17 VITALS — DIASTOLIC BLOOD PRESSURE: 73 MMHG | SYSTOLIC BLOOD PRESSURE: 125 MMHG

## 2021-03-17 VITALS — SYSTOLIC BLOOD PRESSURE: 99 MMHG | DIASTOLIC BLOOD PRESSURE: 69 MMHG

## 2021-03-17 VITALS — DIASTOLIC BLOOD PRESSURE: 54 MMHG | SYSTOLIC BLOOD PRESSURE: 106 MMHG

## 2021-03-17 VITALS — SYSTOLIC BLOOD PRESSURE: 78 MMHG | DIASTOLIC BLOOD PRESSURE: 48 MMHG

## 2021-03-17 VITALS — DIASTOLIC BLOOD PRESSURE: 75 MMHG | SYSTOLIC BLOOD PRESSURE: 116 MMHG

## 2021-03-17 VITALS — SYSTOLIC BLOOD PRESSURE: 90 MMHG | DIASTOLIC BLOOD PRESSURE: 54 MMHG

## 2021-03-17 VITALS — DIASTOLIC BLOOD PRESSURE: 55 MMHG | SYSTOLIC BLOOD PRESSURE: 105 MMHG

## 2021-03-17 VITALS — SYSTOLIC BLOOD PRESSURE: 123 MMHG | DIASTOLIC BLOOD PRESSURE: 71 MMHG

## 2021-03-17 VITALS — SYSTOLIC BLOOD PRESSURE: 113 MMHG | DIASTOLIC BLOOD PRESSURE: 59 MMHG

## 2021-03-17 VITALS — DIASTOLIC BLOOD PRESSURE: 61 MMHG | SYSTOLIC BLOOD PRESSURE: 97 MMHG

## 2021-03-17 VITALS — DIASTOLIC BLOOD PRESSURE: 66 MMHG | SYSTOLIC BLOOD PRESSURE: 112 MMHG

## 2021-03-17 VITALS — SYSTOLIC BLOOD PRESSURE: 107 MMHG | DIASTOLIC BLOOD PRESSURE: 62 MMHG

## 2021-03-17 VITALS — SYSTOLIC BLOOD PRESSURE: 115 MMHG | DIASTOLIC BLOOD PRESSURE: 65 MMHG

## 2021-03-17 LAB
ANION GAP SERPL CALCULATED.3IONS-SCNC: 9 MMOL/L
BUN SERPL-MCNC: 19 MG/DL (ref 8–23)
BUN/CREAT SERPL: 20
CHLORIDE SERPL-SCNC: 102 MMOL/L (ref 95–108)
CHOLEST SERPL-MCNC: 108 MG/DL (ref 0–199)
CHOLEST/HDLC SERPL: 2.9 {RATIO}
CO2 SERPL-SCNC: 31 MMOL/L (ref 22–30)
CREAT SERPL-MCNC: 1 MG/DL (ref 0.5–1)
ERYTHROCYTE [DISTWIDTH] IN BLOOD BY AUTOMATED COUNT: 15.7 % (ref 11.5–15.5)
HCT VFR BLD AUTO: 35.1 % (ref 37–47)
HDLC SERPL-MCNC: 37 MG/DL (ref 40–?)
HGB BLD-MCNC: 10.3 G/DL (ref 12–16)
LDLC SERPL CALC-MCNC: 58 MG/DL
MAGNESIUM SERPL-MCNC: 1.8 MG/DL (ref 1.6–2.3)
MCH RBC QN AUTO: 25.8 PG  CALC (ref 26–32)
MCHC RBC AUTO-ENTMCNC: 29.3 G/DL CAL (ref 32–36)
MCV RBC AUTO: 88 FL  CALC (ref 80–100)
PLATELET # BLD AUTO: 155 THOU/UL (ref 130–400)
POTASSIUM SERPL-SCNC: 3.9 MMOL/L (ref 3.5–5.1)
RBC # BLD AUTO: 3.99 MILL/UL (ref 4.2–5.6)
SODIUM SERPL-SCNC: 138 MMOL/L (ref 137–146)
TRIGL SERPL-MCNC: 61 MG/DL (ref 30–149)
VLDLC SERPL CALC-MCNC: 12 MG/DL

## 2021-03-17 NOTE — NUR
PT STATES SHE NEEDS TO HAVE BM BUT AFTER PUTTING HER ON BEDPAN STATES SHE CANT
GET IT OUT.  ORDER FOR MILK OF MAG RECEIVED AND FAXED TO PHARMACY.

## 2021-03-17 NOTE — NUR
PT RESTING IN BED ALERT& APPROPRIATE.  STATES SHE IS "WORN OUT" FROM TRYING TO
PASS HARD STOOL TODAY, AND FEELS "MISERABLE".  VSS, 2LNC, SR ON MONITOR AT 84
AFEBRILE, DENIES PAIN AT THIS TIME.

## 2021-03-17 NOTE — NUR
PT WAITING FOR PT EVALUATION.  PLACED ON BEDPAN PER PT REQUEST.  REMAINS
ALERT/ORIENTED X3.   CALLED AND IS AWARE OF PT GOING TO [facility]
IF AVAILABLE.

## 2021-03-17 NOTE — NUR
PT UP WITH 2 ASSIST TO BEDSIDE COMMODE AFTER SOAP SUDS ENEMA GIVEN.  PT
TOLERATED TRANSFER TO BSC AND SITTING.  PT HAD LARGE BM.  RETURNED TO BED
DINNER WARMED AND STATES NO OTHER NEEDS AT THIS TIME.

## 2021-03-17 NOTE — NUR
CALLED AND SPOKE WITH CASE MANAGEMENT [facility], SHE STATES THAT WE NEED TO HAVE A PT EVALUATION FIRST.

## 2021-03-17 NOTE — NUR
[facility], THAT SHE FEELS TOO WEAK AND
UNSTEADY ON HER LEG WITH HAVING A CAST ON AND HER KNEES TO BE ABLE TO GO BACK
HOME.

## 2021-03-18 VITALS — DIASTOLIC BLOOD PRESSURE: 60 MMHG | SYSTOLIC BLOOD PRESSURE: 112 MMHG

## 2021-03-18 VITALS — SYSTOLIC BLOOD PRESSURE: 123 MMHG | DIASTOLIC BLOOD PRESSURE: 64 MMHG

## 2021-03-18 VITALS — SYSTOLIC BLOOD PRESSURE: 112 MMHG | DIASTOLIC BLOOD PRESSURE: 60 MMHG

## 2021-03-18 VITALS — SYSTOLIC BLOOD PRESSURE: 128 MMHG | DIASTOLIC BLOOD PRESSURE: 63 MMHG

## 2021-03-18 VITALS — DIASTOLIC BLOOD PRESSURE: 66 MMHG | SYSTOLIC BLOOD PRESSURE: 104 MMHG

## 2021-03-18 VITALS — DIASTOLIC BLOOD PRESSURE: 69 MMHG | SYSTOLIC BLOOD PRESSURE: 116 MMHG

## 2021-03-18 VITALS — SYSTOLIC BLOOD PRESSURE: 113 MMHG | DIASTOLIC BLOOD PRESSURE: 62 MMHG

## 2021-03-18 VITALS — SYSTOLIC BLOOD PRESSURE: 97 MMHG | DIASTOLIC BLOOD PRESSURE: 57 MMHG

## 2021-03-18 VITALS — SYSTOLIC BLOOD PRESSURE: 101 MMHG | DIASTOLIC BLOOD PRESSURE: 52 MMHG

## 2021-03-18 VITALS — DIASTOLIC BLOOD PRESSURE: 64 MMHG | SYSTOLIC BLOOD PRESSURE: 108 MMHG

## 2021-03-18 VITALS — DIASTOLIC BLOOD PRESSURE: 67 MMHG | SYSTOLIC BLOOD PRESSURE: 127 MMHG

## 2021-03-18 VITALS — DIASTOLIC BLOOD PRESSURE: 56 MMHG | SYSTOLIC BLOOD PRESSURE: 96 MMHG

## 2021-03-18 VITALS — DIASTOLIC BLOOD PRESSURE: 58 MMHG | SYSTOLIC BLOOD PRESSURE: 111 MMHG

## 2021-03-18 VITALS — DIASTOLIC BLOOD PRESSURE: 57 MMHG | SYSTOLIC BLOOD PRESSURE: 111 MMHG

## 2021-03-18 NOTE — NUR
PT RESTING QUIETLY AT THIS TIME, USING PORTABLE PHONE TO TALK TO FAMILY.  CASE
MANAGEMENT HAS NOT FOUND A PLACEMENT FOR HER YET AT THIS TIME.

## 2021-03-18 NOTE — NUR
PT HERE FOR EVALUATION AGAIN.  PT UNABLE TO PUT PRESSURE ON RIGHT LEG DUE TO
KNEE SURGERY AND FRACTURE TO RIGHT ANKLE

## 2021-03-18 NOTE — NUR
PT ALERT/ORIENTED X3, ASKING IF SHE WAS GOING TO BE ABLE TO GO TO NURSING HOME
TODAY.  PT EATING WELL, IV FLUIDS STILL INFUSING.

## 2021-03-18 NOTE — NUR
PT HAS BEEN HEAVY ON THE CALL LIGHT THROUGHOUT THIS SHIFT WITH C/O OF PAIN AND
WANTING "THE MEDICINES THAT SHE TAKES".  PATIENT HAS RECEIVED PRN PAIN
MEDICATION AS WELL AS ANXIETY MEDICATION, IN ADDITION TO A SLEEP AID.
ADMINISTRATION OF MEDICATIONS SPACED OUT APPROPRIATELY TO REDUCE RISK OF
ADVERSE SIDE EFFECTS.  EDUCATED PT THAT SOMETIMES HOME MEDICATIONS ARE NOT
ORDERED.
PTS BEHAVIOR IS OBSESSIVE IN NATURE REGARDING WHAT SHE HAS "AVAILABLE TO
TAKE".  OFFERED PT REPOSITIONING/TURNING MEASURES AS WELL AS APPLIED HEAT TO
ASSIST IN HER COMFORT.  PT DECLINED THOSE OPTIONS AT THIS TIME.

## 2021-03-18 NOTE — NUR
PT SLEEPING SOUNDLY AT THIS TIME. PT IV PUMP OCCLUDING FREQUENTLY.  PLACED 22G
IN RW FOR CONTINUOUS IV MAINTENANCE.

## 2021-03-18 NOTE — NUR
has had many requests, "call the doctor. i need more medicine". request
denied. pt said "i'm afraid i won't have enough for tonight. i'm going home
tomorrow." pt ressured.  requested purewick to be changed. said "it feels
funny." purewick changed. conts to ask for more meds. request denied.

## 2021-03-18 NOTE — NUR
requested this writer "talk to me about what i can have next." pt referring to
meds. xanax 1mg po given per request for anxiety. pt is NOT visibly anxious.
o2 cont per nc. cardiac monitor shows sinus rhythm hr 64. #22 rt wrist. ns
infusing @ 75cchr. po fluids taken well. purewick in place. fall precautions
cont.

## 2021-03-18 NOTE — NUR
PT LAYING IN BED, TALKING ON PHONE.  MARIELENA HAS APPROX 500ML OF URINE
OUTPUT.  PT ALERT/ORIENTED X3, DENIES ANY DISTRESS AT THIS TIME

## 2021-03-19 VITALS — SYSTOLIC BLOOD PRESSURE: 124 MMHG | DIASTOLIC BLOOD PRESSURE: 71 MMHG

## 2021-03-19 VITALS — DIASTOLIC BLOOD PRESSURE: 60 MMHG | SYSTOLIC BLOOD PRESSURE: 104 MMHG

## 2021-03-19 VITALS — DIASTOLIC BLOOD PRESSURE: 58 MMHG | SYSTOLIC BLOOD PRESSURE: 116 MMHG

## 2021-03-19 VITALS — DIASTOLIC BLOOD PRESSURE: 60 MMHG | SYSTOLIC BLOOD PRESSURE: 113 MMHG

## 2021-03-19 VITALS — DIASTOLIC BLOOD PRESSURE: 58 MMHG | SYSTOLIC BLOOD PRESSURE: 109 MMHG

## 2021-03-19 NOTE — NUR
Attempted to see patient- she is being discharged to rehab. She has her HEP
and in fact transferred with assist of CNA to wheelchair

## 2021-03-19 NOTE — NUR
PT HAS BEEN DISCHARGED TO HOME THIS MORNING.  DR REYES WROTE ORDERS FOR HOME
HEALTH, PT VERBALIZED UNDERSTANDING OF DC INSTRUCTIONS, TAKEN BY WHEELCHAIR TO
VEHICLE.  PT LEAVES [facility] IN STABLE CONDITION.

## 2021-03-19 NOTE — NUR
PT AWAKE, ALERT, ORIENTED X 3.  PT PROVIDED PAIN MEDICINE UPON HER REQUEST,
STATES THAT THE MEDICINES ORDERED ARE NOT ENOUGH TO COMPENSATE FOR HER LEVEL
OF DISCOMFORT.  PT STATES THAT SHE WANTS TO GO HOME, WILL TALK WITH DR REYES
WHEN HE ROUNDS.

## 2021-03-19 NOTE — NUR
pt called this writer. pt said "isn't there a doctor that will give me some
tylenol with codeine or something?' instructed pt no meds were due.

## 2021-05-12 ENCOUNTER — HOSPITAL ENCOUNTER (EMERGENCY)
Dept: HOSPITAL 82 - ED | Age: 71
Discharge: ANOTHER HOSPITAL | End: 2021-05-12
Payer: MEDICARE

## 2021-05-12 VITALS — HEIGHT: 62 IN | WEIGHT: 200.4 LBS | BODY MASS INDEX: 36.88 KG/M2

## 2021-05-12 VITALS — SYSTOLIC BLOOD PRESSURE: 110 MMHG | DIASTOLIC BLOOD PRESSURE: 82 MMHG

## 2021-05-12 DIAGNOSIS — E03.9: ICD-10-CM

## 2021-05-12 DIAGNOSIS — J44.9: ICD-10-CM

## 2021-05-12 DIAGNOSIS — K21.9: ICD-10-CM

## 2021-05-12 DIAGNOSIS — F32.9: ICD-10-CM

## 2021-05-12 DIAGNOSIS — R00.1: ICD-10-CM

## 2021-05-12 DIAGNOSIS — Z87.01: ICD-10-CM

## 2021-05-12 DIAGNOSIS — Z87.440: ICD-10-CM

## 2021-05-12 DIAGNOSIS — I50.9: ICD-10-CM

## 2021-05-12 DIAGNOSIS — E66.9: ICD-10-CM

## 2021-05-12 DIAGNOSIS — Z87.11: ICD-10-CM

## 2021-05-12 DIAGNOSIS — Z98.84: ICD-10-CM

## 2021-05-12 DIAGNOSIS — I95.9: Primary | ICD-10-CM

## 2021-05-12 DIAGNOSIS — I25.10: ICD-10-CM

## 2021-05-12 LAB
ALBUMIN SERPL-MCNC: 2.8 G/DL (ref 3.2–5)
ALP SERPL-CCNC: 78 U/L (ref 38–126)
ANION GAP SERPL CALCULATED.3IONS-SCNC: 7 MMOL/L
AST SERPL-CCNC: 17 U/L (ref 9–36)
BASOPHILS NFR BLD AUTO: 0 % (ref 0–3)
BILIRUB UR QL STRIP.AUTO: NEGATIVE
BUN SERPL-MCNC: 14 MG/DL (ref 8–23)
BUN/CREAT SERPL: 18
CHLORIDE SERPL-SCNC: 99 MMOL/L (ref 95–108)
CO2 SERPL-SCNC: 33 MMOL/L (ref 22–30)
COLOR UR AUTO: YELLOW
CREAT SERPL-MCNC: 0.8 MG/DL (ref 0.5–1)
EOSINOPHIL NFR BLD AUTO: 4 % (ref 0–8)
ERYTHROCYTE [DISTWIDTH] IN BLOOD BY AUTOMATED COUNT: 17.6 % (ref 11.5–15.5)
GLUCOSE UR STRIP.AUTO-MCNC: NEGATIVE MG/DL
HCT VFR BLD AUTO: 33.9 % (ref 37–47)
HGB BLD-MCNC: 10.1 G/DL (ref 12–16)
HGB UR QL STRIP.AUTO: NEGATIVE
IMM GRANULOCYTES NFR BLD: 0.4 % (ref 0–5)
KETONES UR STRIP.AUTO-MCNC: NEGATIVE MG/DL
LEUKOCYTE ESTERASE UR QL STRIP.AUTO: NEGATIVE
LYMPHOCYTES NFR BLD: 29 % (ref 15–41)
MCH RBC QN AUTO: 24.7 PG  CALC (ref 26–32)
MCHC RBC AUTO-ENTMCNC: 29.8 G/DL CAL (ref 32–36)
MCV RBC AUTO: 82.9 FL  CALC (ref 80–100)
MONOCYTES NFR BLD AUTO: 12 % (ref 2–13)
NEUTROPHILS # BLD AUTO: 3.03 THOU/UL (ref 2–7.15)
NEUTROPHILS NFR BLD AUTO: 55 % (ref 42–76)
NITRITE UR QL STRIP.AUTO: NEGATIVE
PH UR STRIP.AUTO: 5.5 [PH] (ref 4.5–8)
PLATELET # BLD AUTO: 194 THOU/UL (ref 130–400)
POTASSIUM SERPL-SCNC: 3.7 MMOL/L (ref 3.5–5.1)
PROT SERPL-MCNC: 5.8 G/DL (ref 6.3–8.2)
PROT UR QL STRIP.AUTO: NEGATIVE MG/DL
RBC # BLD AUTO: 4.09 MILL/UL (ref 4.2–5.6)
SODIUM SERPL-SCNC: 135 MMOL/L (ref 137–146)
SP GR UR STRIP.AUTO: 1.01
UROBILINOGEN UR QL STRIP.AUTO: 0.2 E.U./DL

## 2021-05-12 PROCEDURE — 02HV33Z INSERTION OF INFUSION DEVICE INTO SUPERIOR VENA CAVA, PERCUTANEOUS APPROACH: ICD-10-PCS | Performed by: FAMILY MEDICINE

## 2021-08-11 ENCOUNTER — HOSPITAL ENCOUNTER (EMERGENCY)
Dept: HOSPITAL 82 - ED | Age: 71
LOS: 1 days | Discharge: ANOTHER HOSPITAL | End: 2021-08-12
Payer: MEDICARE

## 2021-08-11 DIAGNOSIS — E03.9: ICD-10-CM

## 2021-08-11 DIAGNOSIS — Z20.822: ICD-10-CM

## 2021-08-11 DIAGNOSIS — Z98.84: ICD-10-CM

## 2021-08-11 DIAGNOSIS — J44.9: ICD-10-CM
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
## 2021-08-11 DIAGNOSIS — E66.01: ICD-10-CM

## 2021-08-11 DIAGNOSIS — K21.9: ICD-10-CM

## 2021-08-11 DIAGNOSIS — Z87.440: ICD-10-CM

## 2021-08-11 DIAGNOSIS — S82.301A: Primary | ICD-10-CM

## 2021-08-11 DIAGNOSIS — Z87.11: ICD-10-CM

## 2021-08-11 DIAGNOSIS — S82.831A: ICD-10-CM

## 2021-08-11 DIAGNOSIS — I25.10: ICD-10-CM

## 2021-08-11 DIAGNOSIS — F32.9: ICD-10-CM

## 2021-08-11 DIAGNOSIS — I50.9: ICD-10-CM

## 2021-08-11 DIAGNOSIS — Z96.651: ICD-10-CM

## 2021-08-11 PROCEDURE — 2W3QX1Z IMMOBILIZATION OF RIGHT LOWER LEG USING SPLINT: ICD-10-PCS | Performed by: FAMILY MEDICINE

## 2021-08-12 VITALS — DIASTOLIC BLOOD PRESSURE: 55 MMHG | SYSTOLIC BLOOD PRESSURE: 116 MMHG

## 2021-09-05 ENCOUNTER — HOSPITAL ENCOUNTER (EMERGENCY)
Dept: HOSPITAL 82 - ED | Age: 71
LOS: 1 days | Discharge: ANOTHER HOSPITAL | End: 2021-09-06
Payer: MEDICARE

## 2021-09-05 VITALS — BODY MASS INDEX: 34.89 KG/M2 | HEIGHT: 62 IN | WEIGHT: 189.6 LBS

## 2021-09-05 DIAGNOSIS — F32.9: ICD-10-CM

## 2021-09-05 DIAGNOSIS — Z87.440: ICD-10-CM

## 2021-09-05 DIAGNOSIS — Z87.01: ICD-10-CM

## 2021-09-05 DIAGNOSIS — I25.10: ICD-10-CM

## 2021-09-05 DIAGNOSIS — Z98.84: ICD-10-CM

## 2021-09-05 DIAGNOSIS — J18.9: Primary | ICD-10-CM

## 2021-09-05 DIAGNOSIS — Z99.81: ICD-10-CM

## 2021-09-05 DIAGNOSIS — J96.90: ICD-10-CM

## 2021-09-05 DIAGNOSIS — E03.9: ICD-10-CM

## 2021-09-05 DIAGNOSIS — F11.20: ICD-10-CM

## 2021-09-05 DIAGNOSIS — E66.01: ICD-10-CM

## 2021-09-05 DIAGNOSIS — I50.9: ICD-10-CM

## 2021-09-05 DIAGNOSIS — J44.0: ICD-10-CM

## 2021-09-05 DIAGNOSIS — G89.29: ICD-10-CM

## 2021-09-05 DIAGNOSIS — Z20.822: ICD-10-CM

## 2021-09-05 DIAGNOSIS — R79.89: ICD-10-CM

## 2021-09-05 DIAGNOSIS — K21.9: ICD-10-CM

## 2021-09-05 DIAGNOSIS — I95.9: ICD-10-CM

## 2021-09-05 DIAGNOSIS — Z87.11: ICD-10-CM

## 2021-09-05 LAB
ALBUMIN SERPL-MCNC: 3.3 G/DL (ref 3.2–5)
ALP SERPL-CCNC: 112 U/L (ref 38–126)
AMYLASE SERPL-CCNC: 42 U/L (ref 30–110)
ANION GAP SERPL CALCULATED.3IONS-SCNC: 14 MMOL/L
APTT PPP: 31.5 SECONDS (ref 20–32.5)
AST SERPL-CCNC: 30 U/L (ref 9–36)
BASOPHILS NFR BLD AUTO: 1 % (ref 0–3)
BUN SERPL-MCNC: 24 MG/DL (ref 8–23)
BUN/CREAT SERPL: 16
CHLORIDE SERPL-SCNC: 94 MMOL/L (ref 95–108)
CO2 SERPL-SCNC: 24 MMOL/L (ref 22–30)
CREAT SERPL-MCNC: 1.5 MG/DL (ref 0.5–1)
D DIMER PPP FEU-MCNC: 2.19 MG/L (ref 0.19–0.6)
EOSINOPHIL NFR BLD AUTO: 0 % (ref 0–8)
ERYTHROCYTE [DISTWIDTH] IN BLOOD BY AUTOMATED COUNT: 19.1 % (ref 11.5–15.5)
ETHANOL SERPL-MCNC: 0 MG/DL (ref 0–30)
HCT VFR BLD AUTO: 36.1 % (ref 37–47)
HGB BLD-MCNC: 10.7 G/DL (ref 12–16)
IMM GRANULOCYTES NFR BLD: 0.5 % (ref 0–5)
INR PPP: 1.4 RATIO (ref 0.7–1.3)
LIPASE SERPL-CCNC: 13 U/L (ref 23–300)
LYMPHOCYTES NFR BLD: 16 % (ref 15–41)
MCH RBC QN AUTO: 27.3 PG  CALC (ref 26–32)
MCHC RBC AUTO-ENTMCNC: 29.6 G/DL CAL (ref 32–36)
MCV RBC AUTO: 92.1 FL  CALC (ref 80–100)
MONOCYTES NFR BLD AUTO: 3 % (ref 2–13)
NEUTROPHILS # BLD AUTO: 1.76 THOU/UL (ref 2–7.15)
NEUTROPHILS NFR BLD AUTO: 79 % (ref 42–76)
PLATELET # BLD AUTO: 208 THOU/UL (ref 130–400)
POTASSIUM SERPL-SCNC: 4.3 MMOL/L (ref 3.5–5.1)
PROT SERPL-MCNC: 6.6 G/DL (ref 6.3–8.2)
PROTHROMBIN TIME: 14.8 SECONDS (ref 9–12.5)
RBC # BLD AUTO: 3.92 MILL/UL (ref 4.2–5.6)
SODIUM SERPL-SCNC: 128 MMOL/L (ref 137–146)
TSH SERPL DL<=0.05 MIU/L-ACNC: 0.59 UIU/ML (ref 0.47–4.68)

## 2021-09-05 PROCEDURE — 0T9B70Z DRAINAGE OF BLADDER WITH DRAINAGE DEVICE, VIA NATURAL OR ARTIFICIAL OPENING: ICD-10-PCS | Performed by: EMERGENCY MEDICINE

## 2021-09-05 PROCEDURE — 5A09357 ASSISTANCE WITH RESPIRATORY VENTILATION, LESS THAN 24 CONSECUTIVE HOURS, CONTINUOUS POSITIVE AIRWAY PRESSURE: ICD-10-PCS | Performed by: EMERGENCY MEDICINE

## 2021-09-06 VITALS — SYSTOLIC BLOOD PRESSURE: 94 MMHG | DIASTOLIC BLOOD PRESSURE: 47 MMHG

## 2022-02-07 ENCOUNTER — HOSPITAL ENCOUNTER (INPATIENT)
Dept: HOSPITAL 82 - ED | Age: 72
LOS: 5 days | Discharge: HOME HEALTH | DRG: 917 | End: 2022-02-12
Attending: STUDENT IN AN ORGANIZED HEALTH CARE EDUCATION/TRAINING PROGRAM | Admitting: STUDENT IN AN ORGANIZED HEALTH CARE EDUCATION/TRAINING PROGRAM
Payer: MEDICARE

## 2022-02-07 VITALS — BODY MASS INDEX: 33.67 KG/M2 | HEIGHT: 62 IN | WEIGHT: 182.98 LBS

## 2022-02-07 DIAGNOSIS — F11.288: ICD-10-CM

## 2022-02-07 DIAGNOSIS — T42.4X1A: ICD-10-CM

## 2022-02-07 DIAGNOSIS — T45.0X1A: ICD-10-CM

## 2022-02-07 DIAGNOSIS — I48.0: ICD-10-CM

## 2022-02-07 DIAGNOSIS — K21.9: ICD-10-CM

## 2022-02-07 DIAGNOSIS — Z91.51: ICD-10-CM

## 2022-02-07 DIAGNOSIS — M54.9: ICD-10-CM

## 2022-02-07 DIAGNOSIS — E87.6: ICD-10-CM

## 2022-02-07 DIAGNOSIS — Z20.822: ICD-10-CM

## 2022-02-07 DIAGNOSIS — F13.24: ICD-10-CM

## 2022-02-07 DIAGNOSIS — T40.601A: Primary | ICD-10-CM

## 2022-02-07 DIAGNOSIS — E03.9: ICD-10-CM

## 2022-02-07 DIAGNOSIS — J44.9: ICD-10-CM

## 2022-02-07 DIAGNOSIS — F32.A: ICD-10-CM

## 2022-02-07 DIAGNOSIS — J69.0: ICD-10-CM

## 2022-02-07 DIAGNOSIS — I11.0: ICD-10-CM

## 2022-02-07 DIAGNOSIS — G89.29: ICD-10-CM

## 2022-02-07 DIAGNOSIS — E87.2: ICD-10-CM

## 2022-02-07 DIAGNOSIS — F13.280: ICD-10-CM

## 2022-02-07 DIAGNOSIS — G92.8: ICD-10-CM

## 2022-02-07 DIAGNOSIS — F11.24: ICD-10-CM

## 2022-02-07 DIAGNOSIS — I25.10: ICD-10-CM

## 2022-02-07 DIAGNOSIS — J96.90: ICD-10-CM

## 2022-02-07 DIAGNOSIS — Z87.440: ICD-10-CM

## 2022-02-07 DIAGNOSIS — Z91.81: ICD-10-CM

## 2022-02-07 DIAGNOSIS — Z87.11: ICD-10-CM

## 2022-02-07 DIAGNOSIS — Z98.84: ICD-10-CM

## 2022-02-07 DIAGNOSIS — E66.01: ICD-10-CM

## 2022-02-07 DIAGNOSIS — I50.9: ICD-10-CM

## 2022-02-07 PROCEDURE — S0164 INJECTION PANTROPRAZOLE: HCPCS

## 2022-02-08 VITALS — SYSTOLIC BLOOD PRESSURE: 144 MMHG | DIASTOLIC BLOOD PRESSURE: 86 MMHG

## 2022-02-08 VITALS — DIASTOLIC BLOOD PRESSURE: 58 MMHG | SYSTOLIC BLOOD PRESSURE: 114 MMHG

## 2022-02-08 VITALS — SYSTOLIC BLOOD PRESSURE: 140 MMHG | DIASTOLIC BLOOD PRESSURE: 82 MMHG

## 2022-02-08 VITALS — SYSTOLIC BLOOD PRESSURE: 146 MMHG | DIASTOLIC BLOOD PRESSURE: 56 MMHG

## 2022-02-08 VITALS — DIASTOLIC BLOOD PRESSURE: 78 MMHG | SYSTOLIC BLOOD PRESSURE: 135 MMHG

## 2022-02-08 VITALS — DIASTOLIC BLOOD PRESSURE: 78 MMHG | SYSTOLIC BLOOD PRESSURE: 125 MMHG

## 2022-02-08 VITALS — DIASTOLIC BLOOD PRESSURE: 67 MMHG | SYSTOLIC BLOOD PRESSURE: 116 MMHG

## 2022-02-08 VITALS — DIASTOLIC BLOOD PRESSURE: 80 MMHG | SYSTOLIC BLOOD PRESSURE: 148 MMHG

## 2022-02-08 VITALS — SYSTOLIC BLOOD PRESSURE: 108 MMHG | DIASTOLIC BLOOD PRESSURE: 65 MMHG

## 2022-02-08 VITALS — DIASTOLIC BLOOD PRESSURE: 77 MMHG | SYSTOLIC BLOOD PRESSURE: 111 MMHG

## 2022-02-08 VITALS — DIASTOLIC BLOOD PRESSURE: 62 MMHG | SYSTOLIC BLOOD PRESSURE: 113 MMHG

## 2022-02-08 VITALS — SYSTOLIC BLOOD PRESSURE: 120 MMHG | DIASTOLIC BLOOD PRESSURE: 75 MMHG

## 2022-02-08 VITALS — SYSTOLIC BLOOD PRESSURE: 95 MMHG | DIASTOLIC BLOOD PRESSURE: 57 MMHG

## 2022-02-08 VITALS — DIASTOLIC BLOOD PRESSURE: 78 MMHG | SYSTOLIC BLOOD PRESSURE: 120 MMHG

## 2022-02-08 VITALS — SYSTOLIC BLOOD PRESSURE: 106 MMHG | DIASTOLIC BLOOD PRESSURE: 51 MMHG

## 2022-02-08 VITALS — SYSTOLIC BLOOD PRESSURE: 133 MMHG | DIASTOLIC BLOOD PRESSURE: 81 MMHG

## 2022-02-08 VITALS — DIASTOLIC BLOOD PRESSURE: 66 MMHG | SYSTOLIC BLOOD PRESSURE: 132 MMHG

## 2022-02-08 LAB
ALBUMIN SERPL-MCNC: 2.9 G/DL (ref 3.2–5)
ALBUMIN SERPL-MCNC: 3.6 G/DL (ref 3.2–5)
ALP SERPL-CCNC: 69 U/L (ref 38–126)
ALP SERPL-CCNC: 75 U/L (ref 38–126)
ANION GAP SERPL CALCULATED.3IONS-SCNC: 12 MMOL/L
ANION GAP SERPL CALCULATED.3IONS-SCNC: 15 MMOL/L
APTT PPP: 29.7 SECONDS (ref 20–32.5)
AST SERPL-CCNC: 23 U/L (ref 9–36)
AST SERPL-CCNC: 28 U/L (ref 9–36)
BASOPHILS NFR BLD AUTO: 0 % (ref 0–3)
BASOPHILS NFR BLD AUTO: 1 % (ref 0–3)
BILIRUB UR QL STRIP.AUTO: NEGATIVE
BUN SERPL-MCNC: 11 MG/DL (ref 8–23)
BUN SERPL-MCNC: 12 MG/DL (ref 8–23)
BUN/CREAT SERPL: 14
BUN/CREAT SERPL: 14
CHLORIDE SERPL-SCNC: 100 MMOL/L (ref 95–108)
CHLORIDE SERPL-SCNC: 103 MMOL/L (ref 95–108)
CO2 SERPL-SCNC: 24 MMOL/L (ref 22–30)
CO2 SERPL-SCNC: 24 MMOL/L (ref 22–30)
COLOR UR AUTO: YELLOW
CREAT SERPL-MCNC: 0.8 MG/DL (ref 0.5–1)
CREAT SERPL-MCNC: 0.8 MG/DL (ref 0.5–1)
EOSINOPHIL NFR BLD AUTO: 1 % (ref 0–8)
EOSINOPHIL NFR BLD AUTO: 2 % (ref 0–8)
ERYTHROCYTE [DISTWIDTH] IN BLOOD BY AUTOMATED COUNT: 16.4 % (ref 11.5–15.5)
ERYTHROCYTE [DISTWIDTH] IN BLOOD BY AUTOMATED COUNT: 16.5 % (ref 11.5–15.5)
ETHANOL SERPL-MCNC: 0 MG/DL (ref 0–30)
GLUCOSE UR STRIP.AUTO-MCNC: NEGATIVE MG/DL
HCT VFR BLD AUTO: 39.1 % (ref 37–47)
HCT VFR BLD AUTO: 41.8 % (ref 37–47)
HGB BLD-MCNC: 12.1 G/DL (ref 12–16)
HGB BLD-MCNC: 12.8 G/DL (ref 12–16)
HGB UR QL STRIP.AUTO: NEGATIVE
IMM GRANULOCYTES NFR BLD: 0.5 % (ref 0–5)
IMM GRANULOCYTES NFR BLD: 0.8 % (ref 0–5)
INR PPP: 1.1 RATIO (ref 0.7–1.3)
KETONES UR STRIP.AUTO-MCNC: NEGATIVE MG/DL
LEUKOCYTE ESTERASE UR QL STRIP.AUTO: NEGATIVE
LIPASE SERPL-CCNC: 49 U/L (ref 23–300)
LYMPHOCYTES NFR BLD: 20 % (ref 15–41)
LYMPHOCYTES NFR BLD: 22 % (ref 15–41)
MAGNESIUM SERPL-MCNC: 1.8 MG/DL (ref 1.6–2.3)
MCH RBC QN AUTO: 26.2 PG  CALC (ref 26–32)
MCH RBC QN AUTO: 26.3 PG  CALC (ref 26–32)
MCHC RBC AUTO-ENTMCNC: 30.6 G/DL CAL (ref 32–36)
MCHC RBC AUTO-ENTMCNC: 30.9 G/DL CAL (ref 32–36)
MCV RBC AUTO: 85 FL  CALC (ref 80–100)
MCV RBC AUTO: 85.5 FL  CALC (ref 80–100)
MONOCYTES NFR BLD AUTO: 10 % (ref 2–13)
MONOCYTES NFR BLD AUTO: 13 % (ref 2–13)
NEUTROPHILS # BLD AUTO: 5.15 THOU/UL (ref 2–7.15)
NEUTROPHILS # BLD AUTO: 6.21 THOU/UL (ref 2–7.15)
NEUTROPHILS NFR BLD AUTO: 62 % (ref 42–76)
NEUTROPHILS NFR BLD AUTO: 68 % (ref 42–76)
NITRITE UR QL STRIP.AUTO: NEGATIVE
PH UR STRIP.AUTO: 6 [PH] (ref 4.5–8)
PLATELET # BLD AUTO: 205 THOU/UL (ref 130–400)
PLATELET # BLD AUTO: 287 THOU/UL (ref 130–400)
POTASSIUM SERPL-SCNC: 3.1 MMOL/L (ref 3.5–5.1)
POTASSIUM SERPL-SCNC: 4 MMOL/L (ref 3.5–5.1)
PROT SERPL-MCNC: 6.2 G/DL (ref 6.3–8.2)
PROT SERPL-MCNC: 7.5 G/DL (ref 6.3–8.2)
PROT UR QL STRIP.AUTO: NEGATIVE MG/DL
PROTHROMBIN TIME: 11.7 SECONDS (ref 9–12.5)
RBC # BLD AUTO: 4.6 MILL/UL (ref 4.2–5.6)
RBC # BLD AUTO: 4.89 MILL/UL (ref 4.2–5.6)
SODIUM SERPL-SCNC: 135 MMOL/L (ref 137–146)
SODIUM SERPL-SCNC: 136 MMOL/L (ref 137–146)
SP GR UR STRIP.AUTO: 1.02
UROBILINOGEN UR QL STRIP.AUTO: 0.2 E.U./DL

## 2022-02-08 PROCEDURE — 5A1935Z RESPIRATORY VENTILATION, LESS THAN 24 CONSECUTIVE HOURS: ICD-10-PCS | Performed by: EMERGENCY MEDICINE

## 2022-02-08 PROCEDURE — 0T9B70Z DRAINAGE OF BLADDER WITH DRAINAGE DEVICE, VIA NATURAL OR ARTIFICIAL OPENING: ICD-10-PCS | Performed by: EMERGENCY MEDICINE

## 2022-02-08 NOTE — NUR
PT SEEN SEDATED AND VENTILATED, ATIVAN DRIP FOR SEDATION.  PT WITH OCCASIONAL
MOVEMENT IN BED, MORE WITH ACTIVITY IN ROOM.  NO DISTRESS.

## 2022-02-08 NOTE — NUR
71 yr old white female admitted icu8 per stretcher from er. pt remains
intubated. eyes open. assisted x4 to bed. bed weight obtained. cardiac monitor
shows sinus rhythm. #20 lt hand saline lock. #22 rt hand ativan gtt infusing
well. posada cath in place. urine yellow. bilat wrist restraints cont. bilat
scds applied. #16 og placed & attached to lis. history obtained per er & old
record. fall precautions initiated.

## 2022-02-08 NOTE — NUR
awake. eating supper. denies resp difficulty. cardiac monitor shows sinus
rhythm hr 82. #22 rt hand saline lock. #20 lt hand ns infusing @ 125cchr.
posada cath in place. urine clear yellow. fall precautions cont.

## 2022-02-08 NOTE — NUR
PT MET CRITERIA FOR EXTUBATION. ORDER GIVEN BY DR. MOULTON
TO EXTUBATE PATIENT. PT EXTUBATED TO ROOM AIR WITH SATS OF 93%. RT TO CONTINUE
TO EVALUATE NEED FOR O2 THERAPY. PT TOLERATED EXTUBATION WELL. VITAL SIGNS ARE
STABLE, AND PATIENT WAS ABLE TO TELL US HER NAME AND DATE OF BIRTH POST
EXTUBATION.

## 2022-02-08 NOTE — NUR
PT HAS BEEN EXTUBATED SUCCESSFULLY, SEEN DROWSY IN THE BED WITH SATS SEEN MID
90s.  PT ROUSES TO VOICE, OTHERWISE IS SLEEPING.

## 2022-02-09 VITALS — SYSTOLIC BLOOD PRESSURE: 132 MMHG | DIASTOLIC BLOOD PRESSURE: 67 MMHG

## 2022-02-09 VITALS — SYSTOLIC BLOOD PRESSURE: 134 MMHG | DIASTOLIC BLOOD PRESSURE: 67 MMHG

## 2022-02-09 VITALS — SYSTOLIC BLOOD PRESSURE: 133 MMHG | DIASTOLIC BLOOD PRESSURE: 63 MMHG

## 2022-02-09 VITALS — DIASTOLIC BLOOD PRESSURE: 67 MMHG | SYSTOLIC BLOOD PRESSURE: 125 MMHG

## 2022-02-09 VITALS — SYSTOLIC BLOOD PRESSURE: 115 MMHG | DIASTOLIC BLOOD PRESSURE: 76 MMHG

## 2022-02-09 VITALS — SYSTOLIC BLOOD PRESSURE: 119 MMHG | DIASTOLIC BLOOD PRESSURE: 61 MMHG

## 2022-02-09 VITALS — SYSTOLIC BLOOD PRESSURE: 146 MMHG | DIASTOLIC BLOOD PRESSURE: 68 MMHG

## 2022-02-09 VITALS — DIASTOLIC BLOOD PRESSURE: 71 MMHG | SYSTOLIC BLOOD PRESSURE: 124 MMHG

## 2022-02-09 VITALS — SYSTOLIC BLOOD PRESSURE: 132 MMHG | DIASTOLIC BLOOD PRESSURE: 65 MMHG

## 2022-02-09 VITALS — SYSTOLIC BLOOD PRESSURE: 118 MMHG | DIASTOLIC BLOOD PRESSURE: 58 MMHG

## 2022-02-09 VITALS — SYSTOLIC BLOOD PRESSURE: 104 MMHG | DIASTOLIC BLOOD PRESSURE: 67 MMHG

## 2022-02-09 VITALS — DIASTOLIC BLOOD PRESSURE: 67 MMHG | SYSTOLIC BLOOD PRESSURE: 110 MMHG

## 2022-02-09 LAB
ANION GAP SERPL CALCULATED.3IONS-SCNC: 12 MMOL/L
BUN SERPL-MCNC: 8 MG/DL (ref 8–23)
BUN/CREAT SERPL: 11
CHLORIDE SERPL-SCNC: 111 MMOL/L (ref 95–108)
CO2 SERPL-SCNC: 20 MMOL/L (ref 22–30)
CREAT SERPL-MCNC: 0.8 MG/DL (ref 0.5–1)
ERYTHROCYTE [DISTWIDTH] IN BLOOD BY AUTOMATED COUNT: 16.8 % (ref 11.5–15.5)
HCT VFR BLD AUTO: 39.9 % (ref 37–47)
HGB BLD-MCNC: 11.7 G/DL (ref 12–16)
MAGNESIUM SERPL-MCNC: 1.5 MG/DL (ref 1.6–2.3)
MCH RBC QN AUTO: 26.1 PG  CALC (ref 26–32)
MCHC RBC AUTO-ENTMCNC: 29.3 G/DL CAL (ref 32–36)
MCV RBC AUTO: 89.1 FL  CALC (ref 80–100)
PLATELET # BLD AUTO: 192 THOU/UL (ref 130–400)
POTASSIUM SERPL-SCNC: 4 MMOL/L (ref 3.5–5.1)
RBC # BLD AUTO: 4.48 MILL/UL (ref 4.2–5.6)
SODIUM SERPL-SCNC: 139 MMOL/L (ref 137–146)

## 2022-02-09 NOTE — NUR
RECEIVE REPORT FROM [facility] ICU. PATIENT ALERT AND ORIENTED. VERY ANXIOUS IS
OBSERVED. EDUCATED PATIENT ABOUT MEDICATIONS AND NURSING CARE. PATIENT AND
FAMILY REFER UNDERSTAND

## 2022-02-09 NOTE — NUR
PT SEEN AWAKE, ALERT, ORIENTED.  LUNGS CLEAR, RA.  PT APPEARS ANXIOUS THIS
MORNING, ENCOURAGEMENT PROVIDED AS NEEDED.

## 2022-02-09 NOTE — NUR
PT CONTINUES WITH ANXIETY, PROVIDED XANAX AND PERCOCET AS REQUESTED.  PT
WORRIED ABOUT GOING TO REHAB.   CAMILO HAS CALLED AND WAS UPDATED.

## 2022-02-10 VITALS — SYSTOLIC BLOOD PRESSURE: 165 MMHG | DIASTOLIC BLOOD PRESSURE: 89 MMHG

## 2022-02-10 VITALS — SYSTOLIC BLOOD PRESSURE: 132 MMHG | DIASTOLIC BLOOD PRESSURE: 69 MMHG

## 2022-02-10 VITALS — DIASTOLIC BLOOD PRESSURE: 72 MMHG | SYSTOLIC BLOOD PRESSURE: 148 MMHG

## 2022-02-10 VITALS — SYSTOLIC BLOOD PRESSURE: 139 MMHG | DIASTOLIC BLOOD PRESSURE: 67 MMHG

## 2022-02-10 VITALS — DIASTOLIC BLOOD PRESSURE: 87 MMHG | SYSTOLIC BLOOD PRESSURE: 143 MMHG

## 2022-02-10 LAB
ANION GAP SERPL CALCULATED.3IONS-SCNC: 10 MMOL/L
BUN SERPL-MCNC: 4 MG/DL (ref 8–23)
BUN/CREAT SERPL: 6
CHLORIDE SERPL-SCNC: 112 MMOL/L (ref 95–108)
CO2 SERPL-SCNC: 21 MMOL/L (ref 22–30)
CREAT SERPL-MCNC: 0.7 MG/DL (ref 0.5–1)
ERYTHROCYTE [DISTWIDTH] IN BLOOD BY AUTOMATED COUNT: 16.9 % (ref 11.5–15.5)
HCT VFR BLD AUTO: 34.7 % (ref 37–47)
HGB BLD-MCNC: 10.6 G/DL (ref 12–16)
MAGNESIUM SERPL-MCNC: 1.9 MG/DL (ref 1.6–2.3)
MCH RBC QN AUTO: 26.4 PG  CALC (ref 26–32)
MCHC RBC AUTO-ENTMCNC: 30.5 G/DL CAL (ref 32–36)
MCV RBC AUTO: 86.3 FL  CALC (ref 80–100)
PLATELET # BLD AUTO: 184 THOU/UL (ref 130–400)
POTASSIUM SERPL-SCNC: 2.7 MMOL/L (ref 3.5–5.1)
RBC # BLD AUTO: 4.02 MILL/UL (ref 4.2–5.6)
SODIUM SERPL-SCNC: 140 MMOL/L (ref 137–146)

## 2022-02-10 NOTE — NUR
Blood culture results called to [facility] 1/4 vials growing gram (+) cocci.
No new orders at this time.
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
## 2022-02-10 NOTE — NUR
Patient supine as entered room. Agreed to participate in session.  supine>sit
independent.  Sit>stand independent but all energy exercted throough UE.
unsteady on feet, patient has no confidence when standing exerted all enrgy
through UE.  with vebal cues and explanation patient relieve stress through UE
and tsken 2 steps.  Stand>sit independent, sit>supine independant.  as in
semoi-fowlers position patient executed short arc quads, heel slides, hip
abduction and ankle pumps. [facility] 6 score of 13

## 2022-02-10 NOTE — NUR
Patient was sitting up in bed, with lunch in front of her, but she
communicated she was not hungry. Faye presents with barriers secondary to
poor mental health. She was AOx4, able to complete simple self care tasks, but
is perseverating on returning home to pack bags before going to nursing home.
She consistently repeated how confused she was.

## 2022-02-10 NOTE — NUR
MD PRESCRIBED ADRIANA, PT CONTENT, MEDICATED AT THIS TIME, VOICES NO NEEDS OR
COMPLAINTS AT THIS TIME, CALL LIGHT IN REACH,CONTINUE TO MONITOR.

## 2022-02-11 VITALS — DIASTOLIC BLOOD PRESSURE: 66 MMHG | SYSTOLIC BLOOD PRESSURE: 140 MMHG

## 2022-02-11 VITALS — SYSTOLIC BLOOD PRESSURE: 149 MMHG | DIASTOLIC BLOOD PRESSURE: 75 MMHG

## 2022-02-11 VITALS — DIASTOLIC BLOOD PRESSURE: 86 MMHG | SYSTOLIC BLOOD PRESSURE: 169 MMHG

## 2022-02-11 VITALS — DIASTOLIC BLOOD PRESSURE: 92 MMHG | SYSTOLIC BLOOD PRESSURE: 156 MMHG

## 2022-02-11 VITALS — SYSTOLIC BLOOD PRESSURE: 162 MMHG | DIASTOLIC BLOOD PRESSURE: 77 MMHG

## 2022-02-11 LAB
ANION GAP SERPL CALCULATED.3IONS-SCNC: 7 MMOL/L
BUN SERPL-MCNC: 3 MG/DL (ref 8–23)
BUN/CREAT SERPL: 5
CHLORIDE SERPL-SCNC: 113 MMOL/L (ref 95–108)
CO2 SERPL-SCNC: 22 MMOL/L (ref 22–30)
CREAT SERPL-MCNC: 0.6 MG/DL (ref 0.5–1)
ERYTHROCYTE [DISTWIDTH] IN BLOOD BY AUTOMATED COUNT: 17.1 % (ref 11.5–15.5)
HCT VFR BLD AUTO: 33.9 % (ref 37–47)
HGB BLD-MCNC: 10.1 G/DL (ref 12–16)
MAGNESIUM SERPL-MCNC: 1.7 MG/DL (ref 1.6–2.3)
MCH RBC QN AUTO: 26.2 PG  CALC (ref 26–32)
MCHC RBC AUTO-ENTMCNC: 29.8 G/DL CAL (ref 32–36)
MCV RBC AUTO: 88.1 FL  CALC (ref 80–100)
PLATELET # BLD AUTO: 174 THOU/UL (ref 130–400)
POTASSIUM SERPL-SCNC: 3.3 MMOL/L (ref 3.5–5.1)
RBC # BLD AUTO: 3.85 MILL/UL (ref 4.2–5.6)
SODIUM SERPL-SCNC: 139 MMOL/L (ref 137–146)

## 2022-02-11 NOTE — NUR
PT RESTING IN BED WATCHING TV, PT C/O "IMMENSE PAIN" TO LOWER BACK. NO SIGNS
OF DISTRESS NOTED, RESP EVEN AND UNLABORED. CNA AT BEDSIDE REMOVING DINNER
TRAY. OFFERED PT WARM PACKS, PT AGREED. WARM COMPRESSESS APPLIED TO LOWER
BACK, REPOSITIONED WITH PILLOWS. PT STATES SHE TAKES STRONGER PAIN MEDICATIONS
THAN PRESCRIBED. WILL NOTIFY MD, ASSESSMENT REVIEW COMPLETED, CALL LIGHT IN
REACH,CONTINUE TO MONITOR.

## 2022-02-11 NOTE — NUR
PT RESTING WITH EYES CLOSED, EASILY AROUSED TO VERBALIZED UNDERSTANDING.
VOICES NO NEEDS OR COMPLAINTS AT THIS TIME, IV ANTIBIOTIC HUNG, CALL LIGHT IN
REACH,CONTINUE TO MONITOR.

## 2022-02-11 NOTE — NUR
Subjective:  Patient states that she doen't know what is going to happen to
her when she leaves the hospital.
 
Objective:  Patient did the following:
                  Patient did seated B LE AROM exercises:  hip flexion, hip
                  adduction and abduction, hamstring curls, knee extension,
                  gluteal squeezes, and ankle pumps for 2 x 10 reps with 1 to
                  2 rest periods.
 
                  Patient also did bed mobility, transfers, and gait ADLs with
                  min A x 1 covering approximately 10 feet x 2 reps around the
                  room on level surfaces.
 
Assessment:  Patient continues to receive min A x 1 with increased functional
weight bearing ADLs secondary to muscle weakness, decreased endurance, and
inconsistency with proper body mechanics.
 
Plan:  Patient to continue with PT intervention focusing on strengthening
exercises, practice of dynamic balance, and practice fo proper body mechanics
to help patient get back to near PLOF.  Am Pac score at 11 points, discharge
recommendation will be extended care facility placement.

## 2022-02-11 NOTE — NUR
PT RESTING IN BED, MEDICATED WITH ADDITIONAL PERCOCET APPROVED BY MD, PT VERY
THANKFUL AND SHOWING GRATITUDE, NO SIGNS OF DISTRESS NOTED, RESP EVEN AND
UNLABORED. CALL LIGHT IN REACH,CONTINUE TO MONITOR.

## 2022-02-11 NOTE — NUR
REPORT RECEIVE THIS MORNING FROM SYLVIE SUNSHINE. PATIENT STABLE. NO DISTRESS AT
THIS TIME. REPORT PAIN MEDICATIONS IS ADMINISTER. GIORGIO MARTINEZ IS DONE FOR
FALL PRECAUTIONS AND PATIENT SATISFACTION.

## 2022-02-11 NOTE — NUR
PT RESTING IN BED WITH EYES CLOSED, EASILY AROUSED TO VERBAL STIMULI,
PHLEBOTOMIST AT BEDSIDE, CALL LIGHT IN REACH,CONTINUE TO MONITOR.

## 2022-02-12 VITALS — SYSTOLIC BLOOD PRESSURE: 175 MMHG | DIASTOLIC BLOOD PRESSURE: 98 MMHG

## 2022-02-12 VITALS — SYSTOLIC BLOOD PRESSURE: 139 MMHG | DIASTOLIC BLOOD PRESSURE: 66 MMHG

## 2022-02-12 VITALS — DIASTOLIC BLOOD PRESSURE: 70 MMHG | SYSTOLIC BLOOD PRESSURE: 127 MMHG

## 2022-02-12 LAB
ANION GAP SERPL CALCULATED.3IONS-SCNC: 10 MMOL/L
BUN SERPL-MCNC: 3 MG/DL (ref 8–23)
BUN/CREAT SERPL: 5
C DIFF TOX A+B STL QL: NEGATIVE
CHLORIDE SERPL-SCNC: 112 MMOL/L (ref 95–108)
CO2 SERPL-SCNC: 23 MMOL/L (ref 22–30)
CREAT SERPL-MCNC: 0.6 MG/DL (ref 0.5–1)
ERYTHROCYTE [DISTWIDTH] IN BLOOD BY AUTOMATED COUNT: 17.1 % (ref 11.5–15.5)
HCT VFR BLD AUTO: 38.2 % (ref 37–47)
HGB BLD-MCNC: 11.4 G/DL (ref 12–16)
MAGNESIUM SERPL-MCNC: 1.7 MG/DL (ref 1.6–2.3)
MCH RBC QN AUTO: 26.1 PG  CALC (ref 26–32)
MCHC RBC AUTO-ENTMCNC: 29.8 G/DL CAL (ref 32–36)
MCV RBC AUTO: 87.6 FL  CALC (ref 80–100)
PLATELET # BLD AUTO: 179 THOU/UL (ref 130–400)
POTASSIUM SERPL-SCNC: 3.7 MMOL/L (ref 3.5–5.1)
RBC # BLD AUTO: 4.36 MILL/UL (ref 4.2–5.6)
SODIUM SERPL-SCNC: 141 MMOL/L (ref 137–146)

## 2022-02-12 NOTE — NUR
PT IN BED; CUMMINS REMOVED PER . ORDERS. CATHETER INTACT UPON REMOVAL WITH 800
CC OF CLEAR, YELLOW URINE, PT TOLERATED WELL. PT C/O DIARRHEA, SAMPLE
COLLECTED PER DR'S ORDERS. CALL LIGHT WITHIN REACH.

## 2022-02-12 NOTE — NUR
PT RESTING IN BED WATCHING TV, NO SIGNS OF DISTRESS NOTED, RESP EVEN AND
UNLABORED. PT VOICES NO NEEDS OR COMPLAINTS AT THIS TIME, GINGERALE PROVIDED.
CALL LIGHT IN REACH,CONTINUE TO MONITOR.

## 2022-02-12 NOTE — NUR
PT SITTING IN BED. PT GOT HOME MEDS FROM PHARMACY. NO DISTRESS NOTED. PT
STATES SHE IS READY TO GO HOME. IV REMOVED #20G ON LEFT WRIST; CATHETER INTACT
UPON REMOVAL; PT TOLERATED WELL.

## 2022-02-12 NOTE — NUR
PT CALLED REQUESTING A BLANKET AND C/O PAIN TO HER BACK, PT MEDICATED PER MAR,
NO SIGNS OF DISTRESS NOTED, RESP EVEN AND UNLABORED. CALL LIGHT IN
REACH,CONTINUE TO MONITOR.

## 2022-02-12 NOTE — NUR
PT RESTING IN BED EASILY AROUSED TO VERBAL STIMULI, IV ZOSYN HUNG, VOICES NO
NEEDS OR COMPLAINTS AT THIS TIME, CALL LIGHT IN REACH,CONTINUE TO MONITOR.

## 2022-02-12 NOTE — NUR
PT SITTING IN BED IN HIGH BHATT'S POSITION. EVEN AND UNLABORED RESPIRATIONS
UPON AUSCULTATION. ACTIVE BOWEL SOUNDS X4 QUADRANTS. TELEMETRY IN PLACE. CUMMINS
CATHETER IN PLACE WITH CLEAR, YELLOW URINE. PT MEDICATION SENT TO PHARMACY;
PILLS COUNTED WITH PT. SAFETY PRECAUTIONS IN PLACE. CALL LIGHT WITHIN REACH.

## 2022-02-17 ENCOUNTER — HOSPITAL ENCOUNTER (INPATIENT)
Dept: HOSPITAL 82 - ED | Age: 72
LOS: 6 days | Discharge: NURSING, REHAB OR LONG-TERM CARE | DRG: 178 | End: 2022-02-23
Attending: STUDENT IN AN ORGANIZED HEALTH CARE EDUCATION/TRAINING PROGRAM | Admitting: STUDENT IN AN ORGANIZED HEALTH CARE EDUCATION/TRAINING PROGRAM
Payer: MEDICARE

## 2022-02-17 VITALS — SYSTOLIC BLOOD PRESSURE: 96 MMHG | DIASTOLIC BLOOD PRESSURE: 49 MMHG

## 2022-02-17 VITALS — DIASTOLIC BLOOD PRESSURE: 52 MMHG | SYSTOLIC BLOOD PRESSURE: 98 MMHG

## 2022-02-17 VITALS — SYSTOLIC BLOOD PRESSURE: 124 MMHG | DIASTOLIC BLOOD PRESSURE: 105 MMHG

## 2022-02-17 VITALS — DIASTOLIC BLOOD PRESSURE: 52 MMHG | SYSTOLIC BLOOD PRESSURE: 105 MMHG

## 2022-02-17 VITALS — DIASTOLIC BLOOD PRESSURE: 55 MMHG | SYSTOLIC BLOOD PRESSURE: 91 MMHG

## 2022-02-17 VITALS — SYSTOLIC BLOOD PRESSURE: 87 MMHG | DIASTOLIC BLOOD PRESSURE: 44 MMHG

## 2022-02-17 VITALS — DIASTOLIC BLOOD PRESSURE: 57 MMHG | SYSTOLIC BLOOD PRESSURE: 101 MMHG

## 2022-02-17 VITALS — SYSTOLIC BLOOD PRESSURE: 94 MMHG | DIASTOLIC BLOOD PRESSURE: 48 MMHG

## 2022-02-17 VITALS — DIASTOLIC BLOOD PRESSURE: 60 MMHG | SYSTOLIC BLOOD PRESSURE: 113 MMHG

## 2022-02-17 VITALS — BODY MASS INDEX: 36.51 KG/M2 | WEIGHT: 198.42 LBS | HEIGHT: 62 IN

## 2022-02-17 VITALS — SYSTOLIC BLOOD PRESSURE: 98 MMHG | DIASTOLIC BLOOD PRESSURE: 55 MMHG

## 2022-02-17 VITALS — SYSTOLIC BLOOD PRESSURE: 93 MMHG | DIASTOLIC BLOOD PRESSURE: 46 MMHG

## 2022-02-17 VITALS — DIASTOLIC BLOOD PRESSURE: 55 MMHG | SYSTOLIC BLOOD PRESSURE: 98 MMHG

## 2022-02-17 VITALS — DIASTOLIC BLOOD PRESSURE: 96 MMHG | SYSTOLIC BLOOD PRESSURE: 116 MMHG

## 2022-02-17 VITALS — SYSTOLIC BLOOD PRESSURE: 91 MMHG | DIASTOLIC BLOOD PRESSURE: 71 MMHG

## 2022-02-17 VITALS — DIASTOLIC BLOOD PRESSURE: 69 MMHG | SYSTOLIC BLOOD PRESSURE: 93 MMHG

## 2022-02-17 VITALS — SYSTOLIC BLOOD PRESSURE: 134 MMHG | DIASTOLIC BLOOD PRESSURE: 56 MMHG

## 2022-02-17 VITALS — DIASTOLIC BLOOD PRESSURE: 59 MMHG | SYSTOLIC BLOOD PRESSURE: 117 MMHG

## 2022-02-17 VITALS — SYSTOLIC BLOOD PRESSURE: 92 MMHG | DIASTOLIC BLOOD PRESSURE: 50 MMHG

## 2022-02-17 VITALS — DIASTOLIC BLOOD PRESSURE: 57 MMHG | SYSTOLIC BLOOD PRESSURE: 92 MMHG

## 2022-02-17 VITALS — DIASTOLIC BLOOD PRESSURE: 51 MMHG | SYSTOLIC BLOOD PRESSURE: 95 MMHG

## 2022-02-17 VITALS — SYSTOLIC BLOOD PRESSURE: 91 MMHG | DIASTOLIC BLOOD PRESSURE: 49 MMHG

## 2022-02-17 VITALS — SYSTOLIC BLOOD PRESSURE: 117 MMHG | DIASTOLIC BLOOD PRESSURE: 69 MMHG

## 2022-02-17 VITALS — SYSTOLIC BLOOD PRESSURE: 110 MMHG | DIASTOLIC BLOOD PRESSURE: 63 MMHG

## 2022-02-17 VITALS — SYSTOLIC BLOOD PRESSURE: 112 MMHG | DIASTOLIC BLOOD PRESSURE: 78 MMHG

## 2022-02-17 DIAGNOSIS — J69.0: Primary | ICD-10-CM

## 2022-02-17 DIAGNOSIS — Z20.822: ICD-10-CM

## 2022-02-17 DIAGNOSIS — K21.9: ICD-10-CM

## 2022-02-17 DIAGNOSIS — Z87.440: ICD-10-CM

## 2022-02-17 DIAGNOSIS — F41.9: ICD-10-CM

## 2022-02-17 DIAGNOSIS — F13.20: ICD-10-CM

## 2022-02-17 DIAGNOSIS — F32.A: ICD-10-CM

## 2022-02-17 DIAGNOSIS — E03.9: ICD-10-CM

## 2022-02-17 DIAGNOSIS — I25.10: ICD-10-CM

## 2022-02-17 DIAGNOSIS — I10: ICD-10-CM

## 2022-02-17 DIAGNOSIS — G47.30: ICD-10-CM

## 2022-02-17 DIAGNOSIS — Z87.11: ICD-10-CM

## 2022-02-17 DIAGNOSIS — I95.9: ICD-10-CM

## 2022-02-17 DIAGNOSIS — G62.9: ICD-10-CM

## 2022-02-17 DIAGNOSIS — F11.20: ICD-10-CM

## 2022-02-17 DIAGNOSIS — J44.9: ICD-10-CM

## 2022-02-17 DIAGNOSIS — G89.29: ICD-10-CM

## 2022-02-17 DIAGNOSIS — I47.1: ICD-10-CM

## 2022-02-17 DIAGNOSIS — Z98.84: ICD-10-CM

## 2022-02-17 DIAGNOSIS — I48.0: ICD-10-CM

## 2022-02-17 DIAGNOSIS — R09.02: ICD-10-CM

## 2022-02-17 DIAGNOSIS — E66.01: ICD-10-CM

## 2022-02-17 LAB
ALBUMIN SERPL-MCNC: 3.3 G/DL (ref 3.2–5)
ALP SERPL-CCNC: 66 U/L (ref 38–126)
ANION GAP SERPL CALCULATED.3IONS-SCNC: 15 MMOL/L
AST SERPL-CCNC: 23 U/L (ref 9–36)
BASOPHILS NFR BLD AUTO: 1 % (ref 0–3)
BILIRUB UR QL STRIP.AUTO: NEGATIVE
BUN SERPL-MCNC: 12 MG/DL (ref 8–23)
BUN/CREAT SERPL: 17
CHLORIDE SERPL-SCNC: 98 MMOL/L (ref 95–108)
CO2 SERPL-SCNC: 26 MMOL/L (ref 22–30)
COLOR UR AUTO: YELLOW
CREAT SERPL-MCNC: 0.7 MG/DL (ref 0.5–1)
EOSINOPHIL NFR BLD AUTO: 0 % (ref 0–8)
ERYTHROCYTE [DISTWIDTH] IN BLOOD BY AUTOMATED COUNT: 17.4 % (ref 11.5–15.5)
GLUCOSE UR STRIP.AUTO-MCNC: NEGATIVE MG/DL
HCT VFR BLD AUTO: 36.6 % (ref 37–47)
HGB BLD-MCNC: 10.9 G/DL (ref 12–16)
HGB UR QL STRIP.AUTO: NEGATIVE
IMM GRANULOCYTES NFR BLD: 0 % (ref 0–5)
KETONES UR STRIP.AUTO-MCNC: NEGATIVE MG/DL
LEUKOCYTE ESTERASE UR QL STRIP.AUTO: NEGATIVE
LYMPHOCYTES NFR BLD: 6 % (ref 15–41)
MCH RBC QN AUTO: 26.5 PG  CALC (ref 26–32)
MCHC RBC AUTO-ENTMCNC: 29.8 G/DL CAL (ref 32–36)
MCV RBC AUTO: 88.8 FL  CALC (ref 80–100)
MONOCYTES NFR BLD AUTO: 3 % (ref 2–13)
NEUTROPHILS # BLD AUTO: 2.18 THOU/UL (ref 2–7.15)
NEUTROPHILS NFR BLD AUTO: 90 % (ref 42–76)
NITRITE UR QL STRIP.AUTO: NEGATIVE
PH UR STRIP.AUTO: 6 [PH] (ref 4.5–8)
PLATELET # BLD AUTO: 131 THOU/UL (ref 130–400)
POTASSIUM SERPL-SCNC: 3.6 MMOL/L (ref 3.5–5.1)
PROT SERPL-MCNC: 6.7 G/DL (ref 6.3–8.2)
PROT UR QL STRIP.AUTO: NEGATIVE MG/DL
RBC # BLD AUTO: 4.12 MILL/UL (ref 4.2–5.6)
SODIUM SERPL-SCNC: 135 MMOL/L (ref 137–146)
SP GR UR STRIP.AUTO: 1.02
UROBILINOGEN UR QL STRIP.AUTO: 1 E.U./DL

## 2022-02-17 PROCEDURE — 0T9B70Z DRAINAGE OF BLADDER WITH DRAINAGE DEVICE, VIA NATURAL OR ARTIFICIAL OPENING: ICD-10-PCS | Performed by: EMERGENCY MEDICINE

## 2022-02-17 PROCEDURE — 02HV33Z INSERTION OF INFUSION DEVICE INTO SUPERIOR VENA CAVA, PERCUTANEOUS APPROACH: ICD-10-PCS | Performed by: EMERGENCY MEDICINE

## 2022-02-18 VITALS — DIASTOLIC BLOOD PRESSURE: 66 MMHG | SYSTOLIC BLOOD PRESSURE: 148 MMHG

## 2022-02-18 VITALS — SYSTOLIC BLOOD PRESSURE: 118 MMHG | DIASTOLIC BLOOD PRESSURE: 74 MMHG

## 2022-02-18 VITALS — SYSTOLIC BLOOD PRESSURE: 142 MMHG | DIASTOLIC BLOOD PRESSURE: 75 MMHG

## 2022-02-18 VITALS — DIASTOLIC BLOOD PRESSURE: 77 MMHG | SYSTOLIC BLOOD PRESSURE: 146 MMHG

## 2022-02-18 VITALS — DIASTOLIC BLOOD PRESSURE: 68 MMHG | SYSTOLIC BLOOD PRESSURE: 140 MMHG

## 2022-02-18 VITALS — SYSTOLIC BLOOD PRESSURE: 142 MMHG | DIASTOLIC BLOOD PRESSURE: 81 MMHG

## 2022-02-18 VITALS — SYSTOLIC BLOOD PRESSURE: 112 MMHG | DIASTOLIC BLOOD PRESSURE: 67 MMHG

## 2022-02-18 LAB
ANION GAP SERPL CALCULATED.3IONS-SCNC: 11 MMOL/L
BUN SERPL-MCNC: 14 MG/DL (ref 8–23)
BUN/CREAT SERPL: 25
CHLORIDE SERPL-SCNC: 102 MMOL/L (ref 95–108)
CO2 SERPL-SCNC: 28 MMOL/L (ref 22–30)
CREAT SERPL-MCNC: 0.6 MG/DL (ref 0.5–1)
ERYTHROCYTE [DISTWIDTH] IN BLOOD BY AUTOMATED COUNT: 17.6 % (ref 11.5–15.5)
HCT VFR BLD AUTO: 31.5 % (ref 37–47)
HGB BLD-MCNC: 9.5 G/DL (ref 12–16)
MAGNESIUM SERPL-MCNC: 1.6 MG/DL (ref 1.6–2.3)
MCH RBC QN AUTO: 26.3 PG  CALC (ref 26–32)
MCHC RBC AUTO-ENTMCNC: 30.2 G/DL CAL (ref 32–36)
MCV RBC AUTO: 87.3 FL  CALC (ref 80–100)
PLATELET # BLD AUTO: 116 THOU/UL (ref 130–400)
POTASSIUM SERPL-SCNC: 4.2 MMOL/L (ref 3.5–5.1)
RBC # BLD AUTO: 3.61 MILL/UL (ref 4.2–5.6)
SODIUM SERPL-SCNC: 137 MMOL/L (ref 137–146)

## 2022-02-19 VITALS — DIASTOLIC BLOOD PRESSURE: 65 MMHG | SYSTOLIC BLOOD PRESSURE: 165 MMHG

## 2022-02-19 VITALS — DIASTOLIC BLOOD PRESSURE: 83 MMHG | SYSTOLIC BLOOD PRESSURE: 163 MMHG

## 2022-02-19 VITALS — DIASTOLIC BLOOD PRESSURE: 80 MMHG | SYSTOLIC BLOOD PRESSURE: 149 MMHG

## 2022-02-19 VITALS — SYSTOLIC BLOOD PRESSURE: 148 MMHG | DIASTOLIC BLOOD PRESSURE: 81 MMHG

## 2022-02-19 LAB
ANION GAP SERPL CALCULATED.3IONS-SCNC: 8 MMOL/L
BUN SERPL-MCNC: 14 MG/DL (ref 8–23)
BUN/CREAT SERPL: 24
CHLORIDE SERPL-SCNC: 107 MMOL/L (ref 95–108)
CO2 SERPL-SCNC: 28 MMOL/L (ref 22–30)
CREAT SERPL-MCNC: 0.6 MG/DL (ref 0.5–1)
ERYTHROCYTE [DISTWIDTH] IN BLOOD BY AUTOMATED COUNT: 18.2 % (ref 11.5–15.5)
HCT VFR BLD AUTO: 30.3 % (ref 37–47)
HGB BLD-MCNC: 9.1 G/DL (ref 12–16)
MCH RBC QN AUTO: 26.4 PG  CALC (ref 26–32)
MCHC RBC AUTO-ENTMCNC: 30 G/DL CAL (ref 32–36)
MCV RBC AUTO: 87.8 FL  CALC (ref 80–100)
PLATELET # BLD AUTO: 119 THOU/UL (ref 130–400)
POTASSIUM SERPL-SCNC: 3.4 MMOL/L (ref 3.5–5.1)
RBC # BLD AUTO: 3.45 MILL/UL (ref 4.2–5.6)
SODIUM SERPL-SCNC: 140 MMOL/L (ref 137–146)

## 2022-02-20 VITALS — DIASTOLIC BLOOD PRESSURE: 70 MMHG | SYSTOLIC BLOOD PRESSURE: 114 MMHG

## 2022-02-20 VITALS — DIASTOLIC BLOOD PRESSURE: 79 MMHG | SYSTOLIC BLOOD PRESSURE: 125 MMHG

## 2022-02-20 VITALS — DIASTOLIC BLOOD PRESSURE: 87 MMHG | SYSTOLIC BLOOD PRESSURE: 161 MMHG

## 2022-02-20 VITALS — SYSTOLIC BLOOD PRESSURE: 156 MMHG | DIASTOLIC BLOOD PRESSURE: 79 MMHG

## 2022-02-20 VITALS — SYSTOLIC BLOOD PRESSURE: 159 MMHG | DIASTOLIC BLOOD PRESSURE: 91 MMHG

## 2022-02-20 VITALS — SYSTOLIC BLOOD PRESSURE: 142 MMHG | DIASTOLIC BLOOD PRESSURE: 82 MMHG

## 2022-02-20 VITALS — DIASTOLIC BLOOD PRESSURE: 88 MMHG | SYSTOLIC BLOOD PRESSURE: 121 MMHG

## 2022-02-20 VITALS — DIASTOLIC BLOOD PRESSURE: 72 MMHG | SYSTOLIC BLOOD PRESSURE: 108 MMHG

## 2022-02-20 VITALS — DIASTOLIC BLOOD PRESSURE: 68 MMHG | SYSTOLIC BLOOD PRESSURE: 120 MMHG

## 2022-02-20 VITALS — SYSTOLIC BLOOD PRESSURE: 117 MMHG | DIASTOLIC BLOOD PRESSURE: 73 MMHG

## 2022-02-20 VITALS — DIASTOLIC BLOOD PRESSURE: 91 MMHG | SYSTOLIC BLOOD PRESSURE: 137 MMHG

## 2022-02-20 LAB
ANION GAP SERPL CALCULATED.3IONS-SCNC: 9 MMOL/L
BUN SERPL-MCNC: 10 MG/DL (ref 8–23)
BUN/CREAT SERPL: 16
CHLORIDE SERPL-SCNC: 107 MMOL/L (ref 95–108)
CO2 SERPL-SCNC: 29 MMOL/L (ref 22–30)
CREAT SERPL-MCNC: 0.6 MG/DL (ref 0.5–1)
ERYTHROCYTE [DISTWIDTH] IN BLOOD BY AUTOMATED COUNT: 17.7 % (ref 11.5–15.5)
HCT VFR BLD AUTO: 33.1 % (ref 37–47)
HGB BLD-MCNC: 10 G/DL (ref 12–16)
MAGNESIUM SERPL-MCNC: 1.8 MG/DL (ref 1.6–2.3)
MCH RBC QN AUTO: 26.7 PG  CALC (ref 26–32)
MCHC RBC AUTO-ENTMCNC: 30.2 G/DL CAL (ref 32–36)
MCV RBC AUTO: 88.3 FL  CALC (ref 80–100)
PLATELET # BLD AUTO: 181 THOU/UL (ref 130–400)
POTASSIUM SERPL-SCNC: 4.7 MMOL/L (ref 3.5–5.1)
RBC # BLD AUTO: 3.75 MILL/UL (ref 4.2–5.6)
SODIUM SERPL-SCNC: 140 MMOL/L (ref 137–146)

## 2022-02-21 VITALS — SYSTOLIC BLOOD PRESSURE: 152 MMHG | DIASTOLIC BLOOD PRESSURE: 81 MMHG

## 2022-02-21 VITALS — SYSTOLIC BLOOD PRESSURE: 154 MMHG | DIASTOLIC BLOOD PRESSURE: 69 MMHG

## 2022-02-21 VITALS — SYSTOLIC BLOOD PRESSURE: 160 MMHG | DIASTOLIC BLOOD PRESSURE: 86 MMHG

## 2022-02-21 VITALS — SYSTOLIC BLOOD PRESSURE: 164 MMHG | DIASTOLIC BLOOD PRESSURE: 81 MMHG

## 2022-02-21 VITALS — DIASTOLIC BLOOD PRESSURE: 94 MMHG | SYSTOLIC BLOOD PRESSURE: 153 MMHG

## 2022-02-21 VITALS — SYSTOLIC BLOOD PRESSURE: 136 MMHG | DIASTOLIC BLOOD PRESSURE: 69 MMHG

## 2022-02-21 LAB
ANION GAP SERPL CALCULATED.3IONS-SCNC: 8 MMOL/L
BUN SERPL-MCNC: 7 MG/DL (ref 8–23)
BUN/CREAT SERPL: 12
CHLORIDE SERPL-SCNC: 107 MMOL/L (ref 95–108)
CO2 SERPL-SCNC: 31 MMOL/L (ref 22–30)
CREAT SERPL-MCNC: 0.6 MG/DL (ref 0.5–1)
ERYTHROCYTE [DISTWIDTH] IN BLOOD BY AUTOMATED COUNT: 17.4 % (ref 11.5–15.5)
HCT VFR BLD AUTO: 32.9 % (ref 37–47)
HGB BLD-MCNC: 9.8 G/DL (ref 12–16)
MAGNESIUM SERPL-MCNC: 1.8 MG/DL (ref 1.6–2.3)
MCH RBC QN AUTO: 26.4 PG  CALC (ref 26–32)
MCHC RBC AUTO-ENTMCNC: 29.8 G/DL CAL (ref 32–36)
MCV RBC AUTO: 88.7 FL  CALC (ref 80–100)
PLATELET # BLD AUTO: 178 THOU/UL (ref 130–400)
POTASSIUM SERPL-SCNC: 3.6 MMOL/L (ref 3.5–5.1)
RBC # BLD AUTO: 3.71 MILL/UL (ref 4.2–5.6)
SODIUM SERPL-SCNC: 142 MMOL/L (ref 137–146)

## 2022-02-22 VITALS — DIASTOLIC BLOOD PRESSURE: 52 MMHG | SYSTOLIC BLOOD PRESSURE: 154 MMHG

## 2022-02-22 VITALS — DIASTOLIC BLOOD PRESSURE: 65 MMHG | SYSTOLIC BLOOD PRESSURE: 152 MMHG

## 2022-02-22 VITALS — DIASTOLIC BLOOD PRESSURE: 83 MMHG | SYSTOLIC BLOOD PRESSURE: 163 MMHG

## 2022-02-22 VITALS — SYSTOLIC BLOOD PRESSURE: 155 MMHG | DIASTOLIC BLOOD PRESSURE: 85 MMHG

## 2022-02-22 VITALS — DIASTOLIC BLOOD PRESSURE: 75 MMHG | SYSTOLIC BLOOD PRESSURE: 119 MMHG

## 2022-02-22 VITALS — DIASTOLIC BLOOD PRESSURE: 94 MMHG | SYSTOLIC BLOOD PRESSURE: 149 MMHG

## 2022-02-22 LAB
ANION GAP SERPL CALCULATED.3IONS-SCNC: 8 MMOL/L
BUN SERPL-MCNC: 8 MG/DL (ref 8–23)
BUN/CREAT SERPL: 12
CHLORIDE SERPL-SCNC: 104 MMOL/L (ref 95–108)
CO2 SERPL-SCNC: 32 MMOL/L (ref 22–30)
CREAT SERPL-MCNC: 0.6 MG/DL (ref 0.5–1)
ERYTHROCYTE [DISTWIDTH] IN BLOOD BY AUTOMATED COUNT: 17.3 % (ref 11.5–15.5)
HCT VFR BLD AUTO: 30.5 % (ref 37–47)
HGB BLD-MCNC: 9.1 G/DL (ref 12–16)
MCH RBC QN AUTO: 26.1 PG  CALC (ref 26–32)
MCHC RBC AUTO-ENTMCNC: 29.8 G/DL CAL (ref 32–36)
MCV RBC AUTO: 87.6 FL  CALC (ref 80–100)
PLATELET # BLD AUTO: 166 THOU/UL (ref 130–400)
POTASSIUM SERPL-SCNC: 3.8 MMOL/L (ref 3.5–5.1)
RBC # BLD AUTO: 3.48 MILL/UL (ref 4.2–5.6)
SODIUM SERPL-SCNC: 140 MMOL/L (ref 137–146)

## 2022-02-23 VITALS — SYSTOLIC BLOOD PRESSURE: 95 MMHG | DIASTOLIC BLOOD PRESSURE: 60 MMHG

## 2022-02-23 VITALS — DIASTOLIC BLOOD PRESSURE: 82 MMHG | SYSTOLIC BLOOD PRESSURE: 132 MMHG

## 2022-02-23 VITALS — DIASTOLIC BLOOD PRESSURE: 71 MMHG | SYSTOLIC BLOOD PRESSURE: 148 MMHG

## 2022-02-23 VITALS — DIASTOLIC BLOOD PRESSURE: 68 MMHG | SYSTOLIC BLOOD PRESSURE: 106 MMHG

## 2022-04-30 ENCOUNTER — HOSPITAL ENCOUNTER (INPATIENT)
Dept: HOSPITAL 82 - ED | Age: 72
LOS: 3 days | Discharge: HOME | DRG: 917 | End: 2022-05-03
Attending: INTERNAL MEDICINE | Admitting: STUDENT IN AN ORGANIZED HEALTH CARE EDUCATION/TRAINING PROGRAM
Payer: MEDICARE

## 2022-04-30 VITALS — SYSTOLIC BLOOD PRESSURE: 121 MMHG | DIASTOLIC BLOOD PRESSURE: 74 MMHG

## 2022-04-30 VITALS — DIASTOLIC BLOOD PRESSURE: 40 MMHG | SYSTOLIC BLOOD PRESSURE: 75 MMHG

## 2022-04-30 VITALS — DIASTOLIC BLOOD PRESSURE: 70 MMHG | SYSTOLIC BLOOD PRESSURE: 132 MMHG

## 2022-04-30 VITALS — SYSTOLIC BLOOD PRESSURE: 82 MMHG | DIASTOLIC BLOOD PRESSURE: 47 MMHG

## 2022-04-30 VITALS — DIASTOLIC BLOOD PRESSURE: 75 MMHG | SYSTOLIC BLOOD PRESSURE: 131 MMHG

## 2022-04-30 VITALS — DIASTOLIC BLOOD PRESSURE: 78 MMHG | SYSTOLIC BLOOD PRESSURE: 141 MMHG

## 2022-04-30 VITALS — SYSTOLIC BLOOD PRESSURE: 163 MMHG | DIASTOLIC BLOOD PRESSURE: 88 MMHG

## 2022-04-30 VITALS — SYSTOLIC BLOOD PRESSURE: 146 MMHG | DIASTOLIC BLOOD PRESSURE: 80 MMHG

## 2022-04-30 VITALS — DIASTOLIC BLOOD PRESSURE: 75 MMHG | SYSTOLIC BLOOD PRESSURE: 139 MMHG

## 2022-04-30 VITALS — SYSTOLIC BLOOD PRESSURE: 118 MMHG | DIASTOLIC BLOOD PRESSURE: 75 MMHG

## 2022-04-30 VITALS — SYSTOLIC BLOOD PRESSURE: 127 MMHG | DIASTOLIC BLOOD PRESSURE: 75 MMHG

## 2022-04-30 VITALS — DIASTOLIC BLOOD PRESSURE: 36 MMHG | SYSTOLIC BLOOD PRESSURE: 63 MMHG

## 2022-04-30 VITALS — SYSTOLIC BLOOD PRESSURE: 66 MMHG | DIASTOLIC BLOOD PRESSURE: 37 MMHG

## 2022-04-30 VITALS — DIASTOLIC BLOOD PRESSURE: 74 MMHG | SYSTOLIC BLOOD PRESSURE: 123 MMHG

## 2022-04-30 VITALS — DIASTOLIC BLOOD PRESSURE: 74 MMHG | SYSTOLIC BLOOD PRESSURE: 142 MMHG

## 2022-04-30 VITALS — DIASTOLIC BLOOD PRESSURE: 75 MMHG | SYSTOLIC BLOOD PRESSURE: 120 MMHG

## 2022-04-30 VITALS — DIASTOLIC BLOOD PRESSURE: 70 MMHG | SYSTOLIC BLOOD PRESSURE: 113 MMHG

## 2022-04-30 VITALS — SYSTOLIC BLOOD PRESSURE: 169 MMHG | DIASTOLIC BLOOD PRESSURE: 85 MMHG

## 2022-04-30 VITALS — DIASTOLIC BLOOD PRESSURE: 66 MMHG | SYSTOLIC BLOOD PRESSURE: 111 MMHG

## 2022-04-30 VITALS — SYSTOLIC BLOOD PRESSURE: 115 MMHG | DIASTOLIC BLOOD PRESSURE: 75 MMHG

## 2022-04-30 VITALS — SYSTOLIC BLOOD PRESSURE: 108 MMHG | DIASTOLIC BLOOD PRESSURE: 61 MMHG

## 2022-04-30 VITALS — DIASTOLIC BLOOD PRESSURE: 74 MMHG | SYSTOLIC BLOOD PRESSURE: 140 MMHG

## 2022-04-30 VITALS — DIASTOLIC BLOOD PRESSURE: 63 MMHG | SYSTOLIC BLOOD PRESSURE: 109 MMHG

## 2022-04-30 VITALS — DIASTOLIC BLOOD PRESSURE: 81 MMHG | SYSTOLIC BLOOD PRESSURE: 143 MMHG

## 2022-04-30 VITALS — DIASTOLIC BLOOD PRESSURE: 75 MMHG | SYSTOLIC BLOOD PRESSURE: 134 MMHG

## 2022-04-30 VITALS — DIASTOLIC BLOOD PRESSURE: 78 MMHG | SYSTOLIC BLOOD PRESSURE: 133 MMHG

## 2022-04-30 VITALS — DIASTOLIC BLOOD PRESSURE: 104 MMHG | SYSTOLIC BLOOD PRESSURE: 184 MMHG

## 2022-04-30 VITALS — SYSTOLIC BLOOD PRESSURE: 54 MMHG | DIASTOLIC BLOOD PRESSURE: 33 MMHG

## 2022-04-30 VITALS — SYSTOLIC BLOOD PRESSURE: 131 MMHG | DIASTOLIC BLOOD PRESSURE: 75 MMHG

## 2022-04-30 VITALS — DIASTOLIC BLOOD PRESSURE: 89 MMHG | SYSTOLIC BLOOD PRESSURE: 183 MMHG

## 2022-04-30 VITALS — DIASTOLIC BLOOD PRESSURE: 32 MMHG | SYSTOLIC BLOOD PRESSURE: 63 MMHG

## 2022-04-30 VITALS — SYSTOLIC BLOOD PRESSURE: 146 MMHG | DIASTOLIC BLOOD PRESSURE: 77 MMHG

## 2022-04-30 VITALS — DIASTOLIC BLOOD PRESSURE: 116 MMHG | SYSTOLIC BLOOD PRESSURE: 148 MMHG

## 2022-04-30 VITALS — DIASTOLIC BLOOD PRESSURE: 73 MMHG | SYSTOLIC BLOOD PRESSURE: 130 MMHG

## 2022-04-30 VITALS — DIASTOLIC BLOOD PRESSURE: 74 MMHG | SYSTOLIC BLOOD PRESSURE: 98 MMHG

## 2022-04-30 VITALS — WEIGHT: 194.01 LBS | HEIGHT: 62 IN | BODY MASS INDEX: 35.7 KG/M2

## 2022-04-30 VITALS — SYSTOLIC BLOOD PRESSURE: 111 MMHG | DIASTOLIC BLOOD PRESSURE: 67 MMHG

## 2022-04-30 VITALS — SYSTOLIC BLOOD PRESSURE: 123 MMHG | DIASTOLIC BLOOD PRESSURE: 74 MMHG

## 2022-04-30 VITALS — DIASTOLIC BLOOD PRESSURE: 70 MMHG | SYSTOLIC BLOOD PRESSURE: 125 MMHG

## 2022-04-30 VITALS — DIASTOLIC BLOOD PRESSURE: 77 MMHG | SYSTOLIC BLOOD PRESSURE: 124 MMHG

## 2022-04-30 VITALS — SYSTOLIC BLOOD PRESSURE: 133 MMHG | DIASTOLIC BLOOD PRESSURE: 73 MMHG

## 2022-04-30 VITALS — DIASTOLIC BLOOD PRESSURE: 72 MMHG | SYSTOLIC BLOOD PRESSURE: 131 MMHG

## 2022-04-30 VITALS — DIASTOLIC BLOOD PRESSURE: 42 MMHG | SYSTOLIC BLOOD PRESSURE: 76 MMHG

## 2022-04-30 VITALS — SYSTOLIC BLOOD PRESSURE: 84 MMHG | DIASTOLIC BLOOD PRESSURE: 50 MMHG

## 2022-04-30 VITALS — DIASTOLIC BLOOD PRESSURE: 79 MMHG | SYSTOLIC BLOOD PRESSURE: 120 MMHG

## 2022-04-30 VITALS — SYSTOLIC BLOOD PRESSURE: 128 MMHG | DIASTOLIC BLOOD PRESSURE: 74 MMHG

## 2022-04-30 VITALS — SYSTOLIC BLOOD PRESSURE: 125 MMHG | DIASTOLIC BLOOD PRESSURE: 70 MMHG

## 2022-04-30 VITALS — DIASTOLIC BLOOD PRESSURE: 107 MMHG | SYSTOLIC BLOOD PRESSURE: 131 MMHG

## 2022-04-30 VITALS — SYSTOLIC BLOOD PRESSURE: 168 MMHG | DIASTOLIC BLOOD PRESSURE: 145 MMHG

## 2022-04-30 VITALS — DIASTOLIC BLOOD PRESSURE: 73 MMHG | SYSTOLIC BLOOD PRESSURE: 110 MMHG

## 2022-04-30 VITALS — DIASTOLIC BLOOD PRESSURE: 76 MMHG | SYSTOLIC BLOOD PRESSURE: 129 MMHG

## 2022-04-30 VITALS — SYSTOLIC BLOOD PRESSURE: 143 MMHG | DIASTOLIC BLOOD PRESSURE: 80 MMHG

## 2022-04-30 VITALS — DIASTOLIC BLOOD PRESSURE: 70 MMHG | SYSTOLIC BLOOD PRESSURE: 129 MMHG

## 2022-04-30 VITALS — SYSTOLIC BLOOD PRESSURE: 110 MMHG | DIASTOLIC BLOOD PRESSURE: 61 MMHG

## 2022-04-30 VITALS — DIASTOLIC BLOOD PRESSURE: 62 MMHG | SYSTOLIC BLOOD PRESSURE: 111 MMHG

## 2022-04-30 VITALS — DIASTOLIC BLOOD PRESSURE: 91 MMHG | SYSTOLIC BLOOD PRESSURE: 156 MMHG

## 2022-04-30 VITALS — SYSTOLIC BLOOD PRESSURE: 127 MMHG | DIASTOLIC BLOOD PRESSURE: 71 MMHG

## 2022-04-30 VITALS — SYSTOLIC BLOOD PRESSURE: 109 MMHG | DIASTOLIC BLOOD PRESSURE: 77 MMHG

## 2022-04-30 VITALS — DIASTOLIC BLOOD PRESSURE: 84 MMHG | SYSTOLIC BLOOD PRESSURE: 154 MMHG

## 2022-04-30 VITALS — SYSTOLIC BLOOD PRESSURE: 128 MMHG | DIASTOLIC BLOOD PRESSURE: 76 MMHG

## 2022-04-30 VITALS — DIASTOLIC BLOOD PRESSURE: 79 MMHG | SYSTOLIC BLOOD PRESSURE: 140 MMHG

## 2022-04-30 VITALS — SYSTOLIC BLOOD PRESSURE: 116 MMHG | DIASTOLIC BLOOD PRESSURE: 72 MMHG

## 2022-04-30 VITALS — DIASTOLIC BLOOD PRESSURE: 70 MMHG | SYSTOLIC BLOOD PRESSURE: 135 MMHG

## 2022-04-30 VITALS — DIASTOLIC BLOOD PRESSURE: 85 MMHG | SYSTOLIC BLOOD PRESSURE: 157 MMHG

## 2022-04-30 VITALS — SYSTOLIC BLOOD PRESSURE: 129 MMHG | DIASTOLIC BLOOD PRESSURE: 79 MMHG

## 2022-04-30 VITALS — DIASTOLIC BLOOD PRESSURE: 74 MMHG | SYSTOLIC BLOOD PRESSURE: 147 MMHG

## 2022-04-30 VITALS — SYSTOLIC BLOOD PRESSURE: 90 MMHG | DIASTOLIC BLOOD PRESSURE: 57 MMHG

## 2022-04-30 VITALS — DIASTOLIC BLOOD PRESSURE: 87 MMHG | SYSTOLIC BLOOD PRESSURE: 163 MMHG

## 2022-04-30 VITALS — SYSTOLIC BLOOD PRESSURE: 136 MMHG | DIASTOLIC BLOOD PRESSURE: 82 MMHG

## 2022-04-30 VITALS — DIASTOLIC BLOOD PRESSURE: 84 MMHG | SYSTOLIC BLOOD PRESSURE: 140 MMHG

## 2022-04-30 VITALS — SYSTOLIC BLOOD PRESSURE: 135 MMHG | DIASTOLIC BLOOD PRESSURE: 75 MMHG

## 2022-04-30 VITALS — DIASTOLIC BLOOD PRESSURE: 32 MMHG | SYSTOLIC BLOOD PRESSURE: 62 MMHG

## 2022-04-30 VITALS — SYSTOLIC BLOOD PRESSURE: 83 MMHG | DIASTOLIC BLOOD PRESSURE: 37 MMHG

## 2022-04-30 VITALS — DIASTOLIC BLOOD PRESSURE: 73 MMHG | SYSTOLIC BLOOD PRESSURE: 124 MMHG

## 2022-04-30 VITALS — SYSTOLIC BLOOD PRESSURE: 140 MMHG | DIASTOLIC BLOOD PRESSURE: 75 MMHG

## 2022-04-30 VITALS — DIASTOLIC BLOOD PRESSURE: 68 MMHG | SYSTOLIC BLOOD PRESSURE: 116 MMHG

## 2022-04-30 VITALS — SYSTOLIC BLOOD PRESSURE: 143 MMHG | DIASTOLIC BLOOD PRESSURE: 81 MMHG

## 2022-04-30 VITALS — SYSTOLIC BLOOD PRESSURE: 147 MMHG | DIASTOLIC BLOOD PRESSURE: 91 MMHG

## 2022-04-30 VITALS — SYSTOLIC BLOOD PRESSURE: 79 MMHG | DIASTOLIC BLOOD PRESSURE: 56 MMHG

## 2022-04-30 VITALS — SYSTOLIC BLOOD PRESSURE: 113 MMHG | DIASTOLIC BLOOD PRESSURE: 74 MMHG

## 2022-04-30 VITALS — DIASTOLIC BLOOD PRESSURE: 102 MMHG | SYSTOLIC BLOOD PRESSURE: 123 MMHG

## 2022-04-30 VITALS — SYSTOLIC BLOOD PRESSURE: 117 MMHG | DIASTOLIC BLOOD PRESSURE: 72 MMHG

## 2022-04-30 DIAGNOSIS — I25.10: ICD-10-CM

## 2022-04-30 DIAGNOSIS — J69.0: ICD-10-CM

## 2022-04-30 DIAGNOSIS — K21.9: ICD-10-CM

## 2022-04-30 DIAGNOSIS — G47.30: ICD-10-CM

## 2022-04-30 DIAGNOSIS — J96.01: ICD-10-CM

## 2022-04-30 DIAGNOSIS — Z87.11: ICD-10-CM

## 2022-04-30 DIAGNOSIS — G89.29: ICD-10-CM

## 2022-04-30 DIAGNOSIS — I48.91: ICD-10-CM

## 2022-04-30 DIAGNOSIS — Z20.822: ICD-10-CM

## 2022-04-30 DIAGNOSIS — Z98.84: ICD-10-CM

## 2022-04-30 DIAGNOSIS — F32.A: ICD-10-CM

## 2022-04-30 DIAGNOSIS — G93.41: ICD-10-CM

## 2022-04-30 DIAGNOSIS — T40.601A: Primary | ICD-10-CM

## 2022-04-30 DIAGNOSIS — N17.9: ICD-10-CM

## 2022-04-30 DIAGNOSIS — E03.9: ICD-10-CM

## 2022-04-30 DIAGNOSIS — Z87.440: ICD-10-CM

## 2022-04-30 DIAGNOSIS — I95.2: ICD-10-CM

## 2022-04-30 DIAGNOSIS — F11.20: ICD-10-CM

## 2022-04-30 DIAGNOSIS — F41.9: ICD-10-CM

## 2022-04-30 DIAGNOSIS — J44.0: ICD-10-CM

## 2022-04-30 DIAGNOSIS — Z79.01: ICD-10-CM

## 2022-04-30 DIAGNOSIS — G62.9: ICD-10-CM

## 2022-04-30 DIAGNOSIS — I10: ICD-10-CM

## 2022-04-30 LAB
ALBUMIN SERPL-MCNC: 2.9 G/DL (ref 3.2–5)
ALP SERPL-CCNC: 86 U/L (ref 38–126)
ANION GAP SERPL CALCULATED.3IONS-SCNC: 10 MMOL/L
AST SERPL-CCNC: 35 U/L (ref 9–36)
BASOPHILS NFR BLD AUTO: 0 % (ref 0–3)
BILIRUB UR QL STRIP.AUTO: NEGATIVE
BUN SERPL-MCNC: 21 MG/DL (ref 8–23)
BUN/CREAT SERPL: 17
CHLORIDE SERPL-SCNC: 98 MMOL/L (ref 95–108)
CO2 SERPL-SCNC: 26 MMOL/L (ref 22–30)
COLOR UR AUTO: YELLOW
CREAT SERPL-MCNC: 1.2 MG/DL (ref 0.5–1)
EOSINOPHIL NFR BLD AUTO: 1 % (ref 0–8)
ERYTHROCYTE [DISTWIDTH] IN BLOOD BY AUTOMATED COUNT: 16.7 % (ref 11.5–15.5)
GLUCOSE UR STRIP.AUTO-MCNC: NEGATIVE MG/DL
HCT VFR BLD AUTO: 34.2 % (ref 37–47)
HGB BLD-MCNC: 10.4 G/DL (ref 12–16)
HGB UR QL STRIP.AUTO: NEGATIVE
IMM GRANULOCYTES NFR BLD: 0.2 % (ref 0–5)
KETONES UR STRIP.AUTO-MCNC: NEGATIVE MG/DL
LEUKOCYTE ESTERASE UR QL STRIP.AUTO: NEGATIVE
LYMPHOCYTES NFR BLD: 5 % (ref 15–41)
MCH RBC QN AUTO: 26.4 PG  CALC (ref 26–32)
MCHC RBC AUTO-ENTMCNC: 30.4 G/DL CAL (ref 32–36)
MCV RBC AUTO: 86.8 FL  CALC (ref 80–100)
MONOCYTES NFR BLD AUTO: 7 % (ref 2–13)
NEUTROPHILS # BLD AUTO: 4.93 THOU/UL (ref 2–7.15)
NEUTROPHILS NFR BLD AUTO: 87 % (ref 42–76)
NITRITE UR QL STRIP.AUTO: NEGATIVE
PH UR STRIP.AUTO: 5.5 [PH] (ref 4.5–8)
PLATELET # BLD AUTO: 164 THOU/UL (ref 130–400)
POTASSIUM SERPL-SCNC: 3.4 MMOL/L (ref 3.5–5.1)
PROT SERPL-MCNC: 5.9 G/DL (ref 6.3–8.2)
PROT UR QL STRIP.AUTO: NEGATIVE MG/DL
RBC # BLD AUTO: 3.94 MILL/UL (ref 4.2–5.6)
SODIUM SERPL-SCNC: 131 MMOL/L (ref 137–146)
SP GR UR STRIP.AUTO: 1.02
UROBILINOGEN UR QL STRIP.AUTO: 0.2 E.U./DL

## 2022-04-30 PROCEDURE — S0164 INJECTION PANTROPRAZOLE: HCPCS

## 2022-04-30 PROCEDURE — 06HY33Z INSERTION OF INFUSION DEVICE INTO LOWER VEIN, PERCUTANEOUS APPROACH: ICD-10-PCS | Performed by: FAMILY MEDICINE

## 2022-04-30 PROCEDURE — 0T9B70Z DRAINAGE OF BLADDER WITH DRAINAGE DEVICE, VIA NATURAL OR ARTIFICIAL OPENING: ICD-10-PCS | Performed by: FAMILY MEDICINE

## 2022-04-30 PROCEDURE — 5A1935Z RESPIRATORY VENTILATION, LESS THAN 24 CONSECUTIVE HOURS: ICD-10-PCS | Performed by: FAMILY MEDICINE

## 2022-04-30 PROCEDURE — 0BH17EZ INSERTION OF ENDOTRACHEAL AIRWAY INTO TRACHEA, VIA NATURAL OR ARTIFICIAL OPENING: ICD-10-PCS | Performed by: FAMILY MEDICINE

## 2022-05-01 VITALS — DIASTOLIC BLOOD PRESSURE: 63 MMHG | SYSTOLIC BLOOD PRESSURE: 109 MMHG

## 2022-05-01 VITALS — DIASTOLIC BLOOD PRESSURE: 69 MMHG | SYSTOLIC BLOOD PRESSURE: 111 MMHG

## 2022-05-01 VITALS — DIASTOLIC BLOOD PRESSURE: 68 MMHG | SYSTOLIC BLOOD PRESSURE: 122 MMHG

## 2022-05-01 VITALS — SYSTOLIC BLOOD PRESSURE: 129 MMHG | DIASTOLIC BLOOD PRESSURE: 67 MMHG

## 2022-05-01 VITALS — SYSTOLIC BLOOD PRESSURE: 108 MMHG | DIASTOLIC BLOOD PRESSURE: 61 MMHG

## 2022-05-01 VITALS — SYSTOLIC BLOOD PRESSURE: 124 MMHG | DIASTOLIC BLOOD PRESSURE: 63 MMHG

## 2022-05-01 VITALS — DIASTOLIC BLOOD PRESSURE: 65 MMHG | SYSTOLIC BLOOD PRESSURE: 112 MMHG

## 2022-05-01 VITALS — DIASTOLIC BLOOD PRESSURE: 66 MMHG | SYSTOLIC BLOOD PRESSURE: 117 MMHG

## 2022-05-01 VITALS — DIASTOLIC BLOOD PRESSURE: 64 MMHG | SYSTOLIC BLOOD PRESSURE: 120 MMHG

## 2022-05-01 VITALS — SYSTOLIC BLOOD PRESSURE: 106 MMHG | DIASTOLIC BLOOD PRESSURE: 58 MMHG

## 2022-05-01 VITALS — SYSTOLIC BLOOD PRESSURE: 98 MMHG | DIASTOLIC BLOOD PRESSURE: 65 MMHG

## 2022-05-01 VITALS — DIASTOLIC BLOOD PRESSURE: 58 MMHG | SYSTOLIC BLOOD PRESSURE: 115 MMHG

## 2022-05-01 VITALS — DIASTOLIC BLOOD PRESSURE: 60 MMHG | SYSTOLIC BLOOD PRESSURE: 98 MMHG

## 2022-05-01 VITALS — SYSTOLIC BLOOD PRESSURE: 128 MMHG | DIASTOLIC BLOOD PRESSURE: 64 MMHG

## 2022-05-01 VITALS — SYSTOLIC BLOOD PRESSURE: 123 MMHG | DIASTOLIC BLOOD PRESSURE: 71 MMHG

## 2022-05-01 VITALS — SYSTOLIC BLOOD PRESSURE: 102 MMHG | DIASTOLIC BLOOD PRESSURE: 60 MMHG

## 2022-05-01 VITALS — SYSTOLIC BLOOD PRESSURE: 133 MMHG | DIASTOLIC BLOOD PRESSURE: 76 MMHG

## 2022-05-01 VITALS — SYSTOLIC BLOOD PRESSURE: 104 MMHG | DIASTOLIC BLOOD PRESSURE: 56 MMHG

## 2022-05-01 VITALS — DIASTOLIC BLOOD PRESSURE: 76 MMHG | SYSTOLIC BLOOD PRESSURE: 132 MMHG

## 2022-05-01 VITALS — SYSTOLIC BLOOD PRESSURE: 115 MMHG | DIASTOLIC BLOOD PRESSURE: 67 MMHG

## 2022-05-01 VITALS — SYSTOLIC BLOOD PRESSURE: 114 MMHG | DIASTOLIC BLOOD PRESSURE: 67 MMHG

## 2022-05-01 VITALS — SYSTOLIC BLOOD PRESSURE: 109 MMHG | DIASTOLIC BLOOD PRESSURE: 66 MMHG

## 2022-05-01 VITALS — SYSTOLIC BLOOD PRESSURE: 144 MMHG | DIASTOLIC BLOOD PRESSURE: 75 MMHG

## 2022-05-01 VITALS — DIASTOLIC BLOOD PRESSURE: 78 MMHG | SYSTOLIC BLOOD PRESSURE: 135 MMHG

## 2022-05-01 VITALS — SYSTOLIC BLOOD PRESSURE: 93 MMHG | DIASTOLIC BLOOD PRESSURE: 77 MMHG

## 2022-05-01 VITALS — DIASTOLIC BLOOD PRESSURE: 70 MMHG | SYSTOLIC BLOOD PRESSURE: 127 MMHG

## 2022-05-01 VITALS — SYSTOLIC BLOOD PRESSURE: 121 MMHG | DIASTOLIC BLOOD PRESSURE: 62 MMHG

## 2022-05-01 VITALS — SYSTOLIC BLOOD PRESSURE: 120 MMHG | DIASTOLIC BLOOD PRESSURE: 62 MMHG

## 2022-05-01 VITALS — SYSTOLIC BLOOD PRESSURE: 137 MMHG | DIASTOLIC BLOOD PRESSURE: 73 MMHG

## 2022-05-01 VITALS — DIASTOLIC BLOOD PRESSURE: 66 MMHG | SYSTOLIC BLOOD PRESSURE: 119 MMHG

## 2022-05-01 VITALS — DIASTOLIC BLOOD PRESSURE: 70 MMHG | SYSTOLIC BLOOD PRESSURE: 120 MMHG

## 2022-05-01 VITALS — DIASTOLIC BLOOD PRESSURE: 68 MMHG | SYSTOLIC BLOOD PRESSURE: 118 MMHG

## 2022-05-01 VITALS — SYSTOLIC BLOOD PRESSURE: 124 MMHG | DIASTOLIC BLOOD PRESSURE: 70 MMHG

## 2022-05-01 VITALS — DIASTOLIC BLOOD PRESSURE: 76 MMHG | SYSTOLIC BLOOD PRESSURE: 126 MMHG

## 2022-05-01 VITALS — DIASTOLIC BLOOD PRESSURE: 72 MMHG | SYSTOLIC BLOOD PRESSURE: 138 MMHG

## 2022-05-01 VITALS — DIASTOLIC BLOOD PRESSURE: 61 MMHG | SYSTOLIC BLOOD PRESSURE: 105 MMHG

## 2022-05-01 VITALS — SYSTOLIC BLOOD PRESSURE: 135 MMHG | DIASTOLIC BLOOD PRESSURE: 82 MMHG

## 2022-05-01 VITALS — DIASTOLIC BLOOD PRESSURE: 66 MMHG | SYSTOLIC BLOOD PRESSURE: 121 MMHG

## 2022-05-01 VITALS — DIASTOLIC BLOOD PRESSURE: 56 MMHG | SYSTOLIC BLOOD PRESSURE: 100 MMHG

## 2022-05-01 VITALS — DIASTOLIC BLOOD PRESSURE: 71 MMHG | SYSTOLIC BLOOD PRESSURE: 118 MMHG

## 2022-05-01 VITALS — SYSTOLIC BLOOD PRESSURE: 142 MMHG | DIASTOLIC BLOOD PRESSURE: 82 MMHG

## 2022-05-01 VITALS — SYSTOLIC BLOOD PRESSURE: 107 MMHG | DIASTOLIC BLOOD PRESSURE: 69 MMHG

## 2022-05-01 VITALS — DIASTOLIC BLOOD PRESSURE: 58 MMHG | SYSTOLIC BLOOD PRESSURE: 101 MMHG

## 2022-05-01 VITALS — DIASTOLIC BLOOD PRESSURE: 66 MMHG | SYSTOLIC BLOOD PRESSURE: 131 MMHG

## 2022-05-01 VITALS — DIASTOLIC BLOOD PRESSURE: 67 MMHG | SYSTOLIC BLOOD PRESSURE: 115 MMHG

## 2022-05-01 VITALS — SYSTOLIC BLOOD PRESSURE: 121 MMHG | DIASTOLIC BLOOD PRESSURE: 68 MMHG

## 2022-05-01 VITALS — SYSTOLIC BLOOD PRESSURE: 118 MMHG | DIASTOLIC BLOOD PRESSURE: 68 MMHG

## 2022-05-01 VITALS — DIASTOLIC BLOOD PRESSURE: 79 MMHG | SYSTOLIC BLOOD PRESSURE: 134 MMHG

## 2022-05-01 VITALS — SYSTOLIC BLOOD PRESSURE: 106 MMHG | DIASTOLIC BLOOD PRESSURE: 73 MMHG

## 2022-05-01 VITALS — SYSTOLIC BLOOD PRESSURE: 108 MMHG | DIASTOLIC BLOOD PRESSURE: 59 MMHG

## 2022-05-01 VITALS — SYSTOLIC BLOOD PRESSURE: 113 MMHG | DIASTOLIC BLOOD PRESSURE: 89 MMHG

## 2022-05-01 VITALS — DIASTOLIC BLOOD PRESSURE: 67 MMHG | SYSTOLIC BLOOD PRESSURE: 114 MMHG

## 2022-05-01 LAB
ANION GAP SERPL CALCULATED.3IONS-SCNC: 11 MMOL/L
BUN SERPL-MCNC: 18 MG/DL (ref 8–23)
BUN/CREAT SERPL: 20
CHLORIDE SERPL-SCNC: 104 MMOL/L (ref 95–108)
CO2 SERPL-SCNC: 24 MMOL/L (ref 22–30)
CREAT SERPL-MCNC: 0.9 MG/DL (ref 0.5–1)
ERYTHROCYTE [DISTWIDTH] IN BLOOD BY AUTOMATED COUNT: 16.7 % (ref 11.5–15.5)
HCT VFR BLD AUTO: 35.2 % (ref 37–47)
HGB BLD-MCNC: 10.9 G/DL (ref 12–16)
MAGNESIUM SERPL-MCNC: 1.4 MG/DL (ref 1.6–2.3)
MCH RBC QN AUTO: 25.9 PG  CALC (ref 26–32)
MCHC RBC AUTO-ENTMCNC: 31 G/DL CAL (ref 32–36)
MCV RBC AUTO: 83.6 FL  CALC (ref 80–100)
PLATELET # BLD AUTO: 154 THOU/UL (ref 130–400)
POTASSIUM SERPL-SCNC: 3.5 MMOL/L (ref 3.5–5.1)
RBC # BLD AUTO: 4.21 MILL/UL (ref 4.2–5.6)
SODIUM SERPL-SCNC: 135 MMOL/L (ref 137–146)

## 2022-05-02 VITALS — SYSTOLIC BLOOD PRESSURE: 141 MMHG | DIASTOLIC BLOOD PRESSURE: 77 MMHG

## 2022-05-02 VITALS — SYSTOLIC BLOOD PRESSURE: 138 MMHG | DIASTOLIC BLOOD PRESSURE: 65 MMHG

## 2022-05-02 VITALS — DIASTOLIC BLOOD PRESSURE: 57 MMHG | SYSTOLIC BLOOD PRESSURE: 98 MMHG

## 2022-05-02 VITALS — DIASTOLIC BLOOD PRESSURE: 70 MMHG | SYSTOLIC BLOOD PRESSURE: 127 MMHG

## 2022-05-02 VITALS — DIASTOLIC BLOOD PRESSURE: 73 MMHG | SYSTOLIC BLOOD PRESSURE: 144 MMHG

## 2022-05-02 VITALS — SYSTOLIC BLOOD PRESSURE: 112 MMHG | DIASTOLIC BLOOD PRESSURE: 68 MMHG

## 2022-05-02 VITALS — SYSTOLIC BLOOD PRESSURE: 125 MMHG | DIASTOLIC BLOOD PRESSURE: 70 MMHG

## 2022-05-02 VITALS — DIASTOLIC BLOOD PRESSURE: 82 MMHG | SYSTOLIC BLOOD PRESSURE: 161 MMHG

## 2022-05-02 VITALS — DIASTOLIC BLOOD PRESSURE: 67 MMHG | SYSTOLIC BLOOD PRESSURE: 131 MMHG

## 2022-05-02 LAB
ANION GAP SERPL CALCULATED.3IONS-SCNC: 7 MMOL/L
BUN SERPL-MCNC: 17 MG/DL (ref 8–23)
BUN/CREAT SERPL: 20
CHLORIDE SERPL-SCNC: 109 MMOL/L (ref 95–108)
CO2 SERPL-SCNC: 24 MMOL/L (ref 22–30)
CREAT SERPL-MCNC: 0.9 MG/DL (ref 0.5–1)
ERYTHROCYTE [DISTWIDTH] IN BLOOD BY AUTOMATED COUNT: 17.3 % (ref 11.5–15.5)
HCT VFR BLD AUTO: 31.1 % (ref 37–47)
HGB BLD-MCNC: 9.8 G/DL (ref 12–16)
MAGNESIUM SERPL-MCNC: 2.2 MG/DL (ref 1.6–2.3)
MCH RBC QN AUTO: 26.3 PG  CALC (ref 26–32)
MCHC RBC AUTO-ENTMCNC: 31.5 G/DL CAL (ref 32–36)
MCV RBC AUTO: 83.4 FL  CALC (ref 80–100)
PLATELET # BLD AUTO: 169 THOU/UL (ref 130–400)
POTASSIUM SERPL-SCNC: 3.5 MMOL/L (ref 3.5–5.1)
RBC # BLD AUTO: 3.73 MILL/UL (ref 4.2–5.6)
SODIUM SERPL-SCNC: 136 MMOL/L (ref 137–146)

## 2022-05-03 VITALS — DIASTOLIC BLOOD PRESSURE: 78 MMHG | SYSTOLIC BLOOD PRESSURE: 140 MMHG

## 2022-05-03 VITALS — DIASTOLIC BLOOD PRESSURE: 70 MMHG | SYSTOLIC BLOOD PRESSURE: 144 MMHG

## 2022-05-03 VITALS — DIASTOLIC BLOOD PRESSURE: 78 MMHG | SYSTOLIC BLOOD PRESSURE: 157 MMHG

## 2022-05-03 LAB
ANION GAP SERPL CALCULATED.3IONS-SCNC: 6 MMOL/L
BASOPHILS NFR BLD AUTO: 0 % (ref 0–3)
BUN SERPL-MCNC: 12 MG/DL (ref 8–23)
BUN/CREAT SERPL: 16
CHLORIDE SERPL-SCNC: 114 MMOL/L (ref 95–108)
CO2 SERPL-SCNC: 24 MMOL/L (ref 22–30)
CREAT SERPL-MCNC: 0.8 MG/DL (ref 0.5–1)
EOSINOPHIL NFR BLD AUTO: 2 % (ref 0–8)
ERYTHROCYTE [DISTWIDTH] IN BLOOD BY AUTOMATED COUNT: 17.4 % (ref 11.5–15.5)
HCT VFR BLD AUTO: 29.4 % (ref 37–47)
HGB BLD-MCNC: 9.2 G/DL (ref 12–16)
IMM GRANULOCYTES NFR BLD: 0.6 % (ref 0–5)
LYMPHOCYTES NFR BLD: 12 % (ref 15–41)
MAGNESIUM SERPL-MCNC: 1.9 MG/DL (ref 1.6–2.3)
MCH RBC QN AUTO: 26.5 PG  CALC (ref 26–32)
MCHC RBC AUTO-ENTMCNC: 31.3 G/DL CAL (ref 32–36)
MCV RBC AUTO: 84.7 FL  CALC (ref 80–100)
MONOCYTES NFR BLD AUTO: 7 % (ref 2–13)
NEUTROPHILS # BLD AUTO: 6.87 THOU/UL (ref 2–7.15)
NEUTROPHILS NFR BLD AUTO: 78 % (ref 42–76)
PLATELET # BLD AUTO: 154 THOU/UL (ref 130–400)
POTASSIUM SERPL-SCNC: 3.3 MMOL/L (ref 3.5–5.1)
RBC # BLD AUTO: 3.47 MILL/UL (ref 4.2–5.6)
SODIUM SERPL-SCNC: 141 MMOL/L (ref 137–146)

## 2023-03-02 ENCOUNTER — HOSPITAL ENCOUNTER (INPATIENT)
Dept: HOSPITAL 82 - ED | Age: 73
LOS: 4 days | Discharge: PSYCHIATRIC OR SUBSTANCE USE FACILITY | DRG: 871 | End: 2023-03-06
Attending: INTERNAL MEDICINE | Admitting: INTERNAL MEDICINE
Payer: MEDICARE

## 2023-03-02 VITALS — SYSTOLIC BLOOD PRESSURE: 102 MMHG | DIASTOLIC BLOOD PRESSURE: 59 MMHG

## 2023-03-02 VITALS — SYSTOLIC BLOOD PRESSURE: 96 MMHG | DIASTOLIC BLOOD PRESSURE: 58 MMHG

## 2023-03-02 VITALS — SYSTOLIC BLOOD PRESSURE: 93 MMHG | DIASTOLIC BLOOD PRESSURE: 61 MMHG

## 2023-03-02 VITALS — DIASTOLIC BLOOD PRESSURE: 73 MMHG | SYSTOLIC BLOOD PRESSURE: 94 MMHG

## 2023-03-02 VITALS — SYSTOLIC BLOOD PRESSURE: 91 MMHG | DIASTOLIC BLOOD PRESSURE: 64 MMHG

## 2023-03-02 VITALS — DIASTOLIC BLOOD PRESSURE: 43 MMHG | SYSTOLIC BLOOD PRESSURE: 69 MMHG

## 2023-03-02 VITALS — SYSTOLIC BLOOD PRESSURE: 105 MMHG | DIASTOLIC BLOOD PRESSURE: 65 MMHG

## 2023-03-02 VITALS — SYSTOLIC BLOOD PRESSURE: 65 MMHG | DIASTOLIC BLOOD PRESSURE: 33 MMHG

## 2023-03-02 VITALS — SYSTOLIC BLOOD PRESSURE: 111 MMHG | DIASTOLIC BLOOD PRESSURE: 61 MMHG

## 2023-03-02 VITALS — DIASTOLIC BLOOD PRESSURE: 52 MMHG | SYSTOLIC BLOOD PRESSURE: 104 MMHG

## 2023-03-02 VITALS — DIASTOLIC BLOOD PRESSURE: 49 MMHG | SYSTOLIC BLOOD PRESSURE: 123 MMHG

## 2023-03-02 VITALS — DIASTOLIC BLOOD PRESSURE: 61 MMHG | SYSTOLIC BLOOD PRESSURE: 93 MMHG

## 2023-03-02 VITALS — DIASTOLIC BLOOD PRESSURE: 61 MMHG | SYSTOLIC BLOOD PRESSURE: 99 MMHG

## 2023-03-02 VITALS — SYSTOLIC BLOOD PRESSURE: 88 MMHG | DIASTOLIC BLOOD PRESSURE: 53 MMHG

## 2023-03-02 VITALS — SYSTOLIC BLOOD PRESSURE: 113 MMHG | DIASTOLIC BLOOD PRESSURE: 66 MMHG

## 2023-03-02 VITALS — SYSTOLIC BLOOD PRESSURE: 86 MMHG | DIASTOLIC BLOOD PRESSURE: 59 MMHG

## 2023-03-02 VITALS — DIASTOLIC BLOOD PRESSURE: 54 MMHG | SYSTOLIC BLOOD PRESSURE: 101 MMHG

## 2023-03-02 VITALS — DIASTOLIC BLOOD PRESSURE: 48 MMHG | SYSTOLIC BLOOD PRESSURE: 113 MMHG

## 2023-03-02 VITALS — SYSTOLIC BLOOD PRESSURE: 113 MMHG | DIASTOLIC BLOOD PRESSURE: 73 MMHG

## 2023-03-02 VITALS — DIASTOLIC BLOOD PRESSURE: 47 MMHG | SYSTOLIC BLOOD PRESSURE: 105 MMHG

## 2023-03-02 VITALS — SYSTOLIC BLOOD PRESSURE: 98 MMHG | DIASTOLIC BLOOD PRESSURE: 71 MMHG

## 2023-03-02 VITALS — SYSTOLIC BLOOD PRESSURE: 100 MMHG | DIASTOLIC BLOOD PRESSURE: 55 MMHG

## 2023-03-02 VITALS — SYSTOLIC BLOOD PRESSURE: 116 MMHG | DIASTOLIC BLOOD PRESSURE: 56 MMHG

## 2023-03-02 VITALS — SYSTOLIC BLOOD PRESSURE: 110 MMHG | DIASTOLIC BLOOD PRESSURE: 77 MMHG

## 2023-03-02 VITALS — SYSTOLIC BLOOD PRESSURE: 102 MMHG | DIASTOLIC BLOOD PRESSURE: 66 MMHG

## 2023-03-02 VITALS — SYSTOLIC BLOOD PRESSURE: 88 MMHG | DIASTOLIC BLOOD PRESSURE: 57 MMHG

## 2023-03-02 VITALS — DIASTOLIC BLOOD PRESSURE: 52 MMHG | SYSTOLIC BLOOD PRESSURE: 94 MMHG

## 2023-03-02 VITALS — DIASTOLIC BLOOD PRESSURE: 59 MMHG | SYSTOLIC BLOOD PRESSURE: 98 MMHG

## 2023-03-02 VITALS — DIASTOLIC BLOOD PRESSURE: 58 MMHG | SYSTOLIC BLOOD PRESSURE: 98 MMHG

## 2023-03-02 VITALS — SYSTOLIC BLOOD PRESSURE: 131 MMHG | DIASTOLIC BLOOD PRESSURE: 47 MMHG

## 2023-03-02 VITALS — SYSTOLIC BLOOD PRESSURE: 95 MMHG | DIASTOLIC BLOOD PRESSURE: 57 MMHG

## 2023-03-02 VITALS — DIASTOLIC BLOOD PRESSURE: 63 MMHG | SYSTOLIC BLOOD PRESSURE: 113 MMHG

## 2023-03-02 VITALS — DIASTOLIC BLOOD PRESSURE: 57 MMHG | SYSTOLIC BLOOD PRESSURE: 86 MMHG

## 2023-03-02 VITALS — DIASTOLIC BLOOD PRESSURE: 66 MMHG | SYSTOLIC BLOOD PRESSURE: 102 MMHG

## 2023-03-02 VITALS — SYSTOLIC BLOOD PRESSURE: 98 MMHG | DIASTOLIC BLOOD PRESSURE: 56 MMHG

## 2023-03-02 VITALS — SYSTOLIC BLOOD PRESSURE: 101 MMHG | DIASTOLIC BLOOD PRESSURE: 76 MMHG

## 2023-03-02 VITALS — SYSTOLIC BLOOD PRESSURE: 101 MMHG | DIASTOLIC BLOOD PRESSURE: 64 MMHG

## 2023-03-02 VITALS — DIASTOLIC BLOOD PRESSURE: 61 MMHG | SYSTOLIC BLOOD PRESSURE: 91 MMHG

## 2023-03-02 VITALS — SYSTOLIC BLOOD PRESSURE: 112 MMHG | DIASTOLIC BLOOD PRESSURE: 57 MMHG

## 2023-03-02 VITALS — SYSTOLIC BLOOD PRESSURE: 114 MMHG | DIASTOLIC BLOOD PRESSURE: 49 MMHG

## 2023-03-02 VITALS — SYSTOLIC BLOOD PRESSURE: 100 MMHG | DIASTOLIC BLOOD PRESSURE: 42 MMHG

## 2023-03-02 VITALS — SYSTOLIC BLOOD PRESSURE: 83 MMHG | DIASTOLIC BLOOD PRESSURE: 51 MMHG

## 2023-03-02 VITALS — DIASTOLIC BLOOD PRESSURE: 61 MMHG | SYSTOLIC BLOOD PRESSURE: 100 MMHG

## 2023-03-02 VITALS — SYSTOLIC BLOOD PRESSURE: 100 MMHG | DIASTOLIC BLOOD PRESSURE: 58 MMHG

## 2023-03-02 VITALS — DIASTOLIC BLOOD PRESSURE: 50 MMHG | SYSTOLIC BLOOD PRESSURE: 115 MMHG

## 2023-03-02 VITALS — DIASTOLIC BLOOD PRESSURE: 64 MMHG | SYSTOLIC BLOOD PRESSURE: 94 MMHG

## 2023-03-02 VITALS — SYSTOLIC BLOOD PRESSURE: 115 MMHG | DIASTOLIC BLOOD PRESSURE: 63 MMHG

## 2023-03-02 VITALS — SYSTOLIC BLOOD PRESSURE: 99 MMHG | DIASTOLIC BLOOD PRESSURE: 67 MMHG

## 2023-03-02 VITALS — DIASTOLIC BLOOD PRESSURE: 62 MMHG | SYSTOLIC BLOOD PRESSURE: 103 MMHG

## 2023-03-02 VITALS — SYSTOLIC BLOOD PRESSURE: 93 MMHG | DIASTOLIC BLOOD PRESSURE: 55 MMHG

## 2023-03-02 VITALS — SYSTOLIC BLOOD PRESSURE: 101 MMHG | DIASTOLIC BLOOD PRESSURE: 73 MMHG

## 2023-03-02 VITALS — DIASTOLIC BLOOD PRESSURE: 68 MMHG | SYSTOLIC BLOOD PRESSURE: 137 MMHG

## 2023-03-02 VITALS — SYSTOLIC BLOOD PRESSURE: 81 MMHG | DIASTOLIC BLOOD PRESSURE: 50 MMHG

## 2023-03-02 VITALS — DIASTOLIC BLOOD PRESSURE: 76 MMHG | SYSTOLIC BLOOD PRESSURE: 95 MMHG

## 2023-03-02 VITALS — SYSTOLIC BLOOD PRESSURE: 87 MMHG | DIASTOLIC BLOOD PRESSURE: 52 MMHG

## 2023-03-02 VITALS — DIASTOLIC BLOOD PRESSURE: 56 MMHG | SYSTOLIC BLOOD PRESSURE: 77 MMHG

## 2023-03-02 VITALS — SYSTOLIC BLOOD PRESSURE: 95 MMHG | DIASTOLIC BLOOD PRESSURE: 55 MMHG

## 2023-03-02 VITALS — DIASTOLIC BLOOD PRESSURE: 72 MMHG | SYSTOLIC BLOOD PRESSURE: 100 MMHG

## 2023-03-02 VITALS — DIASTOLIC BLOOD PRESSURE: 57 MMHG | SYSTOLIC BLOOD PRESSURE: 97 MMHG

## 2023-03-02 VITALS — SYSTOLIC BLOOD PRESSURE: 99 MMHG | DIASTOLIC BLOOD PRESSURE: 59 MMHG

## 2023-03-02 VITALS — SYSTOLIC BLOOD PRESSURE: 110 MMHG | DIASTOLIC BLOOD PRESSURE: 61 MMHG

## 2023-03-02 VITALS — BODY MASS INDEX: 39.68 KG/M2 | WEIGHT: 215.61 LBS | HEIGHT: 62 IN

## 2023-03-02 VITALS — DIASTOLIC BLOOD PRESSURE: 51 MMHG | SYSTOLIC BLOOD PRESSURE: 68 MMHG

## 2023-03-02 VITALS — SYSTOLIC BLOOD PRESSURE: 123 MMHG | DIASTOLIC BLOOD PRESSURE: 75 MMHG

## 2023-03-02 VITALS — DIASTOLIC BLOOD PRESSURE: 79 MMHG | SYSTOLIC BLOOD PRESSURE: 120 MMHG

## 2023-03-02 VITALS — DIASTOLIC BLOOD PRESSURE: 72 MMHG | SYSTOLIC BLOOD PRESSURE: 94 MMHG

## 2023-03-02 VITALS — SYSTOLIC BLOOD PRESSURE: 102 MMHG | DIASTOLIC BLOOD PRESSURE: 58 MMHG

## 2023-03-02 VITALS — DIASTOLIC BLOOD PRESSURE: 55 MMHG | SYSTOLIC BLOOD PRESSURE: 94 MMHG

## 2023-03-02 VITALS — SYSTOLIC BLOOD PRESSURE: 92 MMHG | DIASTOLIC BLOOD PRESSURE: 57 MMHG

## 2023-03-02 VITALS — SYSTOLIC BLOOD PRESSURE: 68 MMHG | DIASTOLIC BLOOD PRESSURE: 47 MMHG

## 2023-03-02 VITALS — SYSTOLIC BLOOD PRESSURE: 93 MMHG | DIASTOLIC BLOOD PRESSURE: 58 MMHG

## 2023-03-02 VITALS — SYSTOLIC BLOOD PRESSURE: 98 MMHG | DIASTOLIC BLOOD PRESSURE: 62 MMHG

## 2023-03-02 VITALS — SYSTOLIC BLOOD PRESSURE: 103 MMHG | DIASTOLIC BLOOD PRESSURE: 56 MMHG

## 2023-03-02 VITALS — DIASTOLIC BLOOD PRESSURE: 44 MMHG | SYSTOLIC BLOOD PRESSURE: 78 MMHG

## 2023-03-02 VITALS — SYSTOLIC BLOOD PRESSURE: 112 MMHG | DIASTOLIC BLOOD PRESSURE: 75 MMHG

## 2023-03-02 VITALS — DIASTOLIC BLOOD PRESSURE: 73 MMHG | SYSTOLIC BLOOD PRESSURE: 103 MMHG

## 2023-03-02 VITALS — DIASTOLIC BLOOD PRESSURE: 66 MMHG | SYSTOLIC BLOOD PRESSURE: 113 MMHG

## 2023-03-02 VITALS — SYSTOLIC BLOOD PRESSURE: 94 MMHG | DIASTOLIC BLOOD PRESSURE: 67 MMHG

## 2023-03-02 VITALS — DIASTOLIC BLOOD PRESSURE: 65 MMHG | SYSTOLIC BLOOD PRESSURE: 108 MMHG

## 2023-03-02 VITALS — SYSTOLIC BLOOD PRESSURE: 107 MMHG | DIASTOLIC BLOOD PRESSURE: 62 MMHG

## 2023-03-02 VITALS — DIASTOLIC BLOOD PRESSURE: 66 MMHG | SYSTOLIC BLOOD PRESSURE: 96 MMHG

## 2023-03-02 VITALS — DIASTOLIC BLOOD PRESSURE: 69 MMHG | SYSTOLIC BLOOD PRESSURE: 111 MMHG

## 2023-03-02 VITALS — SYSTOLIC BLOOD PRESSURE: 82 MMHG | DIASTOLIC BLOOD PRESSURE: 56 MMHG

## 2023-03-02 VITALS — DIASTOLIC BLOOD PRESSURE: 50 MMHG | SYSTOLIC BLOOD PRESSURE: 67 MMHG

## 2023-03-02 VITALS — DIASTOLIC BLOOD PRESSURE: 54 MMHG | SYSTOLIC BLOOD PRESSURE: 93 MMHG

## 2023-03-02 VITALS — DIASTOLIC BLOOD PRESSURE: 73 MMHG | SYSTOLIC BLOOD PRESSURE: 122 MMHG

## 2023-03-02 VITALS — DIASTOLIC BLOOD PRESSURE: 64 MMHG | SYSTOLIC BLOOD PRESSURE: 86 MMHG

## 2023-03-02 VITALS — SYSTOLIC BLOOD PRESSURE: 86 MMHG | DIASTOLIC BLOOD PRESSURE: 60 MMHG

## 2023-03-02 VITALS — SYSTOLIC BLOOD PRESSURE: 90 MMHG | DIASTOLIC BLOOD PRESSURE: 52 MMHG

## 2023-03-02 VITALS — SYSTOLIC BLOOD PRESSURE: 104 MMHG | DIASTOLIC BLOOD PRESSURE: 62 MMHG

## 2023-03-02 VITALS — DIASTOLIC BLOOD PRESSURE: 49 MMHG | SYSTOLIC BLOOD PRESSURE: 116 MMHG

## 2023-03-02 VITALS — DIASTOLIC BLOOD PRESSURE: 66 MMHG | SYSTOLIC BLOOD PRESSURE: 105 MMHG

## 2023-03-02 DIAGNOSIS — I48.91: ICD-10-CM

## 2023-03-02 DIAGNOSIS — E03.9: ICD-10-CM

## 2023-03-02 DIAGNOSIS — Z87.11: ICD-10-CM

## 2023-03-02 DIAGNOSIS — I11.0: ICD-10-CM

## 2023-03-02 DIAGNOSIS — Z20.822: ICD-10-CM

## 2023-03-02 DIAGNOSIS — F41.9: ICD-10-CM

## 2023-03-02 DIAGNOSIS — J18.9: ICD-10-CM

## 2023-03-02 DIAGNOSIS — Z87.440: ICD-10-CM

## 2023-03-02 DIAGNOSIS — Z99.81: ICD-10-CM

## 2023-03-02 DIAGNOSIS — A41.9: Primary | ICD-10-CM

## 2023-03-02 DIAGNOSIS — I95.9: ICD-10-CM

## 2023-03-02 DIAGNOSIS — F32.A: ICD-10-CM

## 2023-03-02 DIAGNOSIS — Z79.01: ICD-10-CM

## 2023-03-02 DIAGNOSIS — I50.9: ICD-10-CM

## 2023-03-02 DIAGNOSIS — G89.29: ICD-10-CM

## 2023-03-02 DIAGNOSIS — Z98.84: ICD-10-CM

## 2023-03-02 DIAGNOSIS — I25.10: ICD-10-CM

## 2023-03-02 DIAGNOSIS — J44.0: ICD-10-CM

## 2023-03-02 DIAGNOSIS — E66.01: ICD-10-CM

## 2023-03-02 DIAGNOSIS — D64.9: ICD-10-CM

## 2023-03-02 DIAGNOSIS — Z87.01: ICD-10-CM

## 2023-03-02 DIAGNOSIS — G62.9: ICD-10-CM

## 2023-03-02 DIAGNOSIS — G93.41: ICD-10-CM

## 2023-03-02 DIAGNOSIS — J96.01: ICD-10-CM

## 2023-03-02 DIAGNOSIS — R65.20: ICD-10-CM

## 2023-03-02 DIAGNOSIS — F11.20: ICD-10-CM

## 2023-03-02 LAB
ALBUMIN SERPL-MCNC: 2.4 G/DL (ref 3.2–5)
ALP SERPL-CCNC: 106 U/L (ref 38–126)
ANION GAP SERPL CALCULATED.3IONS-SCNC: 7 MMOL/L
AST SERPL-CCNC: 54 U/L (ref 9–36)
BASOPHILS NFR BLD AUTO: 0.1 % (ref 0–3)
BILIRUB UR QL STRIP.AUTO: (no result)
BUN SERPL-MCNC: 23 MG/DL (ref 8–23)
BUN/CREAT SERPL: 28
CHLORIDE SERPL-SCNC: 104 MMOL/L (ref 95–108)
CO2 SERPL-SCNC: 31 MMOL/L (ref 22–30)
COLOR UR AUTO: (no result)
CREAT SERPL-MCNC: 0.8 MG/DL (ref 0.5–1)
EOSINOPHIL NFR BLD AUTO: 0 % (ref 0–8)
ERYTHROCYTE [DISTWIDTH] IN BLOOD BY AUTOMATED COUNT: 18.2 % (ref 11.5–15.5)
GLUCOSE UR STRIP.AUTO-MCNC: NEGATIVE MG/DL
HCT VFR BLD AUTO: 30.6 % (ref 37–47)
HGB BLD-MCNC: 8.6 G/DL (ref 12–16)
HGB UR QL STRIP.AUTO: NEGATIVE
IMM GRANULOCYTES NFR BLD: 0.2 % (ref 0–5)
KETONES UR STRIP.AUTO-MCNC: NEGATIVE MG/DL
LEUKOCYTE ESTERASE UR QL STRIP.AUTO: NEGATIVE
LYMPHOCYTES NFR BLD: 2.5 % (ref 15–41)
MCH RBC QN AUTO: 23.1 PG  CALC (ref 26–32)
MCHC RBC AUTO-ENTMCNC: 28.1 G/DL CAL (ref 32–36)
MCV RBC AUTO: 82.3 FL  CALC (ref 80–100)
MONOCYTES NFR BLD AUTO: 6.8 % (ref 2–13)
NEUTROPHILS # BLD AUTO: 15.82 THOU/UL (ref 2–7.15)
NEUTROPHILS NFR BLD AUTO: 90.4 % (ref 42–76)
NITRITE UR QL STRIP.AUTO: NEGATIVE
PH UR STRIP.AUTO: 5.5 [PH] (ref 4.5–8)
PLATELET # BLD AUTO: 168 THOU/UL (ref 130–400)
POTASSIUM SERPL-SCNC: 3.5 MMOL/L (ref 3.5–5.1)
PROT SERPL-MCNC: 4.8 G/DL (ref 6.3–8.2)
PROT UR QL STRIP.AUTO: (no result) MG/DL
RBC # BLD AUTO: 3.72 MILL/UL (ref 4.2–5.6)
SODIUM SERPL-SCNC: 138 MMOL/L (ref 137–146)
SP GR UR STRIP.AUTO: >=1.03
UROBILINOGEN UR QL STRIP.AUTO: 1 E.U./DL

## 2023-03-02 PROCEDURE — 02HV33Z INSERTION OF INFUSION DEVICE INTO SUPERIOR VENA CAVA, PERCUTANEOUS APPROACH: ICD-10-PCS | Performed by: FAMILY MEDICINE

## 2023-03-02 NOTE — NUR
72 yr old white female admitted to icu3 per stretcher from er. transferred x3
to bed. bed weight obtained. o2 cont per nc. cardiac monitor shows sinus tach.
#20 rac & rij tlc in place. pure wick in place.  history obtained per pt & er
record. oriented to room. fall precautions initiated. pt has MANY requests
(food, drink, pain pills).

## 2023-03-02 NOTE — NUR
PT RESTING COMFORTABLY IN BED. RN AT BEDSIDE. VS STABLE AT THIS TIME. ALL NEEDS
ADDRESSED. WILL CONTINUE TO MONITOR.

## 2023-03-03 VITALS — DIASTOLIC BLOOD PRESSURE: 50 MMHG | SYSTOLIC BLOOD PRESSURE: 102 MMHG

## 2023-03-03 VITALS — SYSTOLIC BLOOD PRESSURE: 101 MMHG | DIASTOLIC BLOOD PRESSURE: 49 MMHG

## 2023-03-03 VITALS — DIASTOLIC BLOOD PRESSURE: 57 MMHG | SYSTOLIC BLOOD PRESSURE: 120 MMHG

## 2023-03-03 VITALS — SYSTOLIC BLOOD PRESSURE: 96 MMHG | DIASTOLIC BLOOD PRESSURE: 61 MMHG

## 2023-03-03 VITALS — DIASTOLIC BLOOD PRESSURE: 61 MMHG | SYSTOLIC BLOOD PRESSURE: 102 MMHG

## 2023-03-03 VITALS — SYSTOLIC BLOOD PRESSURE: 103 MMHG | DIASTOLIC BLOOD PRESSURE: 50 MMHG

## 2023-03-03 VITALS — DIASTOLIC BLOOD PRESSURE: 64 MMHG | SYSTOLIC BLOOD PRESSURE: 111 MMHG

## 2023-03-03 VITALS — SYSTOLIC BLOOD PRESSURE: 114 MMHG | DIASTOLIC BLOOD PRESSURE: 50 MMHG

## 2023-03-03 VITALS — SYSTOLIC BLOOD PRESSURE: 111 MMHG | DIASTOLIC BLOOD PRESSURE: 56 MMHG

## 2023-03-03 VITALS — DIASTOLIC BLOOD PRESSURE: 48 MMHG | SYSTOLIC BLOOD PRESSURE: 97 MMHG

## 2023-03-03 VITALS — SYSTOLIC BLOOD PRESSURE: 93 MMHG | DIASTOLIC BLOOD PRESSURE: 48 MMHG

## 2023-03-03 VITALS — DIASTOLIC BLOOD PRESSURE: 72 MMHG | SYSTOLIC BLOOD PRESSURE: 130 MMHG

## 2023-03-03 VITALS — SYSTOLIC BLOOD PRESSURE: 104 MMHG | DIASTOLIC BLOOD PRESSURE: 54 MMHG

## 2023-03-03 VITALS — DIASTOLIC BLOOD PRESSURE: 63 MMHG | SYSTOLIC BLOOD PRESSURE: 104 MMHG

## 2023-03-03 VITALS — DIASTOLIC BLOOD PRESSURE: 46 MMHG | SYSTOLIC BLOOD PRESSURE: 94 MMHG

## 2023-03-03 VITALS — DIASTOLIC BLOOD PRESSURE: 56 MMHG | SYSTOLIC BLOOD PRESSURE: 98 MMHG

## 2023-03-03 VITALS — SYSTOLIC BLOOD PRESSURE: 96 MMHG | DIASTOLIC BLOOD PRESSURE: 52 MMHG

## 2023-03-03 VITALS — DIASTOLIC BLOOD PRESSURE: 56 MMHG | SYSTOLIC BLOOD PRESSURE: 99 MMHG

## 2023-03-03 VITALS — SYSTOLIC BLOOD PRESSURE: 105 MMHG | DIASTOLIC BLOOD PRESSURE: 74 MMHG

## 2023-03-03 VITALS — DIASTOLIC BLOOD PRESSURE: 48 MMHG | SYSTOLIC BLOOD PRESSURE: 96 MMHG

## 2023-03-03 VITALS — DIASTOLIC BLOOD PRESSURE: 58 MMHG | SYSTOLIC BLOOD PRESSURE: 99 MMHG

## 2023-03-03 VITALS — SYSTOLIC BLOOD PRESSURE: 96 MMHG | DIASTOLIC BLOOD PRESSURE: 58 MMHG

## 2023-03-03 VITALS — DIASTOLIC BLOOD PRESSURE: 82 MMHG | SYSTOLIC BLOOD PRESSURE: 126 MMHG

## 2023-03-03 VITALS — SYSTOLIC BLOOD PRESSURE: 112 MMHG | DIASTOLIC BLOOD PRESSURE: 64 MMHG

## 2023-03-03 VITALS — SYSTOLIC BLOOD PRESSURE: 107 MMHG | DIASTOLIC BLOOD PRESSURE: 63 MMHG

## 2023-03-03 VITALS — DIASTOLIC BLOOD PRESSURE: 66 MMHG | SYSTOLIC BLOOD PRESSURE: 111 MMHG

## 2023-03-03 VITALS — SYSTOLIC BLOOD PRESSURE: 95 MMHG | DIASTOLIC BLOOD PRESSURE: 48 MMHG

## 2023-03-03 VITALS — SYSTOLIC BLOOD PRESSURE: 111 MMHG | DIASTOLIC BLOOD PRESSURE: 85 MMHG

## 2023-03-03 VITALS — SYSTOLIC BLOOD PRESSURE: 109 MMHG | DIASTOLIC BLOOD PRESSURE: 58 MMHG

## 2023-03-03 VITALS — SYSTOLIC BLOOD PRESSURE: 126 MMHG | DIASTOLIC BLOOD PRESSURE: 64 MMHG

## 2023-03-03 VITALS — DIASTOLIC BLOOD PRESSURE: 67 MMHG | SYSTOLIC BLOOD PRESSURE: 105 MMHG

## 2023-03-03 VITALS — DIASTOLIC BLOOD PRESSURE: 52 MMHG | SYSTOLIC BLOOD PRESSURE: 110 MMHG

## 2023-03-03 VITALS — SYSTOLIC BLOOD PRESSURE: 106 MMHG | DIASTOLIC BLOOD PRESSURE: 53 MMHG

## 2023-03-03 VITALS — DIASTOLIC BLOOD PRESSURE: 61 MMHG | SYSTOLIC BLOOD PRESSURE: 125 MMHG

## 2023-03-03 VITALS — DIASTOLIC BLOOD PRESSURE: 58 MMHG | SYSTOLIC BLOOD PRESSURE: 108 MMHG

## 2023-03-03 VITALS — DIASTOLIC BLOOD PRESSURE: 52 MMHG | SYSTOLIC BLOOD PRESSURE: 114 MMHG

## 2023-03-03 VITALS — DIASTOLIC BLOOD PRESSURE: 66 MMHG | SYSTOLIC BLOOD PRESSURE: 108 MMHG

## 2023-03-03 VITALS — SYSTOLIC BLOOD PRESSURE: 109 MMHG | DIASTOLIC BLOOD PRESSURE: 62 MMHG

## 2023-03-03 VITALS — SYSTOLIC BLOOD PRESSURE: 98 MMHG | DIASTOLIC BLOOD PRESSURE: 47 MMHG

## 2023-03-03 VITALS — SYSTOLIC BLOOD PRESSURE: 111 MMHG | DIASTOLIC BLOOD PRESSURE: 75 MMHG

## 2023-03-03 VITALS — SYSTOLIC BLOOD PRESSURE: 115 MMHG | DIASTOLIC BLOOD PRESSURE: 74 MMHG

## 2023-03-03 VITALS — DIASTOLIC BLOOD PRESSURE: 64 MMHG | SYSTOLIC BLOOD PRESSURE: 109 MMHG

## 2023-03-03 VITALS — SYSTOLIC BLOOD PRESSURE: 103 MMHG | DIASTOLIC BLOOD PRESSURE: 53 MMHG

## 2023-03-03 VITALS — SYSTOLIC BLOOD PRESSURE: 116 MMHG | DIASTOLIC BLOOD PRESSURE: 67 MMHG

## 2023-03-03 VITALS — DIASTOLIC BLOOD PRESSURE: 62 MMHG | SYSTOLIC BLOOD PRESSURE: 114 MMHG

## 2023-03-03 VITALS — SYSTOLIC BLOOD PRESSURE: 109 MMHG | DIASTOLIC BLOOD PRESSURE: 60 MMHG

## 2023-03-03 VITALS — SYSTOLIC BLOOD PRESSURE: 115 MMHG | DIASTOLIC BLOOD PRESSURE: 56 MMHG

## 2023-03-03 VITALS — SYSTOLIC BLOOD PRESSURE: 100 MMHG | DIASTOLIC BLOOD PRESSURE: 57 MMHG

## 2023-03-03 VITALS — DIASTOLIC BLOOD PRESSURE: 50 MMHG | SYSTOLIC BLOOD PRESSURE: 96 MMHG

## 2023-03-03 VITALS — DIASTOLIC BLOOD PRESSURE: 53 MMHG | SYSTOLIC BLOOD PRESSURE: 90 MMHG

## 2023-03-03 VITALS — DIASTOLIC BLOOD PRESSURE: 74 MMHG | SYSTOLIC BLOOD PRESSURE: 119 MMHG

## 2023-03-03 VITALS — SYSTOLIC BLOOD PRESSURE: 101 MMHG | DIASTOLIC BLOOD PRESSURE: 57 MMHG

## 2023-03-03 VITALS — DIASTOLIC BLOOD PRESSURE: 54 MMHG | SYSTOLIC BLOOD PRESSURE: 109 MMHG

## 2023-03-03 VITALS — SYSTOLIC BLOOD PRESSURE: 96 MMHG | DIASTOLIC BLOOD PRESSURE: 49 MMHG

## 2023-03-03 VITALS — DIASTOLIC BLOOD PRESSURE: 82 MMHG | SYSTOLIC BLOOD PRESSURE: 132 MMHG

## 2023-03-03 VITALS — DIASTOLIC BLOOD PRESSURE: 59 MMHG | SYSTOLIC BLOOD PRESSURE: 116 MMHG

## 2023-03-03 VITALS — SYSTOLIC BLOOD PRESSURE: 108 MMHG | DIASTOLIC BLOOD PRESSURE: 68 MMHG

## 2023-03-03 LAB
ALBUMIN SERPL-MCNC: 2 G/DL (ref 3.2–5)
ALP SERPL-CCNC: 108 U/L (ref 38–126)
ANION GAP SERPL CALCULATED.3IONS-SCNC: 3 MMOL/L
AST SERPL-CCNC: 37 U/L (ref 9–36)
BUN SERPL-MCNC: 18 MG/DL (ref 8–23)
BUN/CREAT SERPL: 20
CHLORIDE SERPL-SCNC: 111 MMOL/L (ref 95–108)
CO2 SERPL-SCNC: 28 MMOL/L (ref 22–30)
CREAT SERPL-MCNC: 0.9 MG/DL (ref 0.5–1)
ERYTHROCYTE [DISTWIDTH] IN BLOOD BY AUTOMATED COUNT: 18.4 % (ref 11.5–15.5)
HCT VFR BLD AUTO: 26.4 % (ref 37–47)
HGB BLD-MCNC: 7.4 G/DL (ref 12–16)
MAGNESIUM SERPL-MCNC: 1.6 MG/DL (ref 1.6–2.3)
MCH RBC QN AUTO: 23.2 PG  CALC (ref 26–32)
MCHC RBC AUTO-ENTMCNC: 28 G/DL CAL (ref 32–36)
MCV RBC AUTO: 82.8 FL  CALC (ref 80–100)
PLATELET # BLD AUTO: 155 THOU/UL (ref 130–400)
POTASSIUM SERPL-SCNC: 3.9 MMOL/L (ref 3.5–5.1)
PROT SERPL-MCNC: 4.2 G/DL (ref 6.3–8.2)
RBC # BLD AUTO: 3.19 MILL/UL (ref 4.2–5.6)
SODIUM SERPL-SCNC: 138 MMOL/L (ref 137–146)

## 2023-03-03 NOTE — NUR
Patient sitting up in bed. Patient denies any pain. No s/s of distress. VSS.
Will continue to monitor.

## 2023-03-03 NOTE — NUR
Patient lying in bed. Patient denies any pain at this time. VSS. Breathing
even and unlabored. Will continue to monitor.

## 2023-03-03 NOTE — NUR
AWAKE. NO ACUTE DISTRESS. O2 CONT PER NC. CARDIAC MONITOR SHOWS SINUS RHYTHM.
#20 RAC SALINE LOCK. RIJ TLC IN PLACE. NS INFUSING @ 100CCHR. PO FLUIDS TAKEN
WELL. PURE WICK IN PLACE. URINE CLEAR YELLOW. FALL PRECAUTIONS CONT.

## 2023-03-03 NOTE — NUR
PT ARRIVED TO [facility] ROOM 281. RESPIRATIONS EVEN AND UNLABORED ON ROOM AIR.
PRN O2 AT BEDSIDE. TELE MONITORING IN PLACE. RIJ TRIPLE LUMEN INFUSING WITH
IVF INFUSING PER ORDER. #20G RAC PATENT. PT STATES PAIN IS IMPROVED SINCE
MEDICATED IN ICU. DENIES OF ADDITIONAL NEEDS. ALL SAFTEY PRECAUTIONS ARE IN
PLACE WITH CALL LIGHT IN REACH

## 2023-03-03 NOTE — NUR
Patient lying in bed. Patient c/o generalized, aching pain at an 8. Patient
denies any other issues at this time. No s/s of distress at this time.
Assessment complete. VSS. Will continue to monitor.

## 2023-03-03 NOTE — NUR
Patient lying in bed. Denies pain. Breathing even and unlabored. No s/s of
distress. Will continue to monitor.

## 2023-03-03 NOTE — NUR
Patient sitting up in bed eating lunch. Patient denies any pain at this time.
VSS. Breathing even and unlabored. Will continue to monitor.

## 2023-03-03 NOTE — NUR
Patient sitting up in bed.  at bedside. Patient denies any pain at this
time. No issues or concerns. No s/s of distress.

## 2023-03-04 VITALS — SYSTOLIC BLOOD PRESSURE: 121 MMHG | DIASTOLIC BLOOD PRESSURE: 67 MMHG

## 2023-03-04 VITALS — SYSTOLIC BLOOD PRESSURE: 126 MMHG | DIASTOLIC BLOOD PRESSURE: 77 MMHG

## 2023-03-04 VITALS — DIASTOLIC BLOOD PRESSURE: 60 MMHG | SYSTOLIC BLOOD PRESSURE: 117 MMHG

## 2023-03-04 VITALS — SYSTOLIC BLOOD PRESSURE: 126 MMHG | DIASTOLIC BLOOD PRESSURE: 78 MMHG

## 2023-03-04 VITALS — SYSTOLIC BLOOD PRESSURE: 127 MMHG | DIASTOLIC BLOOD PRESSURE: 55 MMHG

## 2023-03-04 VITALS — DIASTOLIC BLOOD PRESSURE: 63 MMHG | SYSTOLIC BLOOD PRESSURE: 114 MMHG

## 2023-03-04 LAB
ALBUMIN SERPL-MCNC: 1.8 G/DL (ref 3.2–5)
ALP SERPL-CCNC: 85 U/L (ref 38–126)
ANION GAP SERPL CALCULATED.3IONS-SCNC: 1 MMOL/L
AST SERPL-CCNC: 24 U/L (ref 9–36)
BUN SERPL-MCNC: 15 MG/DL (ref 8–23)
BUN/CREAT SERPL: 22
CHLORIDE SERPL-SCNC: 110 MMOL/L (ref 95–108)
CO2 SERPL-SCNC: 31 MMOL/L (ref 22–30)
CREAT SERPL-MCNC: 0.7 MG/DL (ref 0.5–1)
ERYTHROCYTE [DISTWIDTH] IN BLOOD BY AUTOMATED COUNT: 18.1 % (ref 11.5–15.5)
HCT VFR BLD AUTO: 25.4 % (ref 37–47)
HGB BLD-MCNC: 7.1 G/DL (ref 12–16)
MAGNESIUM SERPL-MCNC: 1.6 MG/DL (ref 1.6–2.3)
MCH RBC QN AUTO: 22.8 PG  CALC (ref 26–32)
MCHC RBC AUTO-ENTMCNC: 28 G/DL CAL (ref 32–36)
MCV RBC AUTO: 81.7 FL  CALC (ref 80–100)
PLATELET # BLD AUTO: 163 THOU/UL (ref 130–400)
POTASSIUM SERPL-SCNC: 3.6 MMOL/L (ref 3.5–5.1)
PROT SERPL-MCNC: 4 G/DL (ref 6.3–8.2)
RBC # BLD AUTO: 3.11 MILL/UL (ref 4.2–5.6)
SODIUM SERPL-SCNC: 139 MMOL/L (ref 137–146)

## 2023-03-04 NOTE — NUR
Late entry from 0745 03/04/23 Preliminary blood cultrues called to .
1/4 vials growing gram (+) cocci. No new orders.

## 2023-03-04 NOTE — NUR
RN AT BEDSIDE FOR SHIFT ASSESSMENT. PT A/O X3 WITH VERBALIZED PAIN. PAIN
MANAGEMENT PLAN OF CARE CREATED WITH PT FOCUSED INVOLVEMENT; PT VERBALIZES
UNDERSTANDING AND AGREEMENT TO PLAN OF CARE. PT DENIES CHEST PAIN AND/OR
SHORTNESS OF BREATH AT THIS TIME. CALL LIGHT AND PERSONAL BELONGINGS WITHIN
REACH; ROOM SAFETY CHECK COMPLETED. WILL CONTINUE TO MONITOR.

## 2023-03-04 NOTE — NUR
S:  VIRA MONTEZ is a 72 F who presents with sepsis.
 
She has a history of pneumonia. All medications in patient's
chart were reviewed.
 
O:
 
VS: BP <121/67>, P<78>, RR<16>,T<97.8>
 
W <97kg>, HT<62in>, Scr=<1>,CrCl= 52-68<ml/min>
 
A:
 
Blood culture <is pending preliminary shows gram (+) cocci in 1/4
 
Urine culture <is pending
 
P:
 
Patient is on Azithromycin 500mg iv  and Rocephin 1 gram iv.
 
Vancomycin ordered for pharmacy to dose.
 
Start
Vancomycin 1 gram IV Q12H. Vancomycin trough is drawn before the 4th dose
on 03/06/23 @0030.
 
Vancomycin goal trough is between <15-20 mcg/ml>.
 
Pharmacy will follow and or advise on antibiotics use as needed.

## 2023-03-04 NOTE — NUR
PT RESTING IN BED, NO SIGNS OF DISTRESS NOTED, RESP EVEN AND UNLABORED. PT
ALERT AND ORIENTED X3, PT ON 02 NC, PURE WICK IN PLACE. TRIPLE LUMEN CENTRAL
LINE TO RIJ, DRESSING CDI. GOOD BLOOD RETURN. PT ALSO HAS A #20 RAC SL FLUSHED
WELL. DISCUSSED POC, VERBALIZED UNDERSTANDING. PT MEDICATED PER MAR.
ASSESSMENT COMPLETED, CALL LIGHT IN REACH,CONTINUE TO MONITOR.

## 2023-03-04 NOTE — NUR
PT HAD BM AT THIS TIME. GARRETT CARE PROVIDED. NEW PUREWHICK. RESPIRATIONS EVEN
AND UNLABORED ON ROOM AIR. RIJ TRIPLE LUMEN INFUSING WITH IVF PER ORDER. #20G
RAC PATENT. PT ASKING FOR SLEEPING PILL. ALL SAFTEY PRECAUTIONS ARE IN PLACE
WITH CALL LIGHT IN REACH.

## 2023-03-04 NOTE — NUR
PT SLEEPING IN SEMI FOWLERS POSITION. RESPIRATIONS EVEN AND UNLABORED ON ROOM
AIR. PRN 2L NC AT BESIDE. RIJ TRIPLE LUMEN INFUSING WITH IVF PER ORDER. #20G
RAC PATENT. PUREWHICK IN PLACE, DRAINING CLEAR YELLOW URINE. NO SIGNS OF ANY
DISTRESS. ALL SAFETY PRECAUTIONS ARE IN PLACE WITH CALL LIGHT IN REACH.

## 2023-03-05 VITALS — SYSTOLIC BLOOD PRESSURE: 114 MMHG | DIASTOLIC BLOOD PRESSURE: 52 MMHG

## 2023-03-05 VITALS — DIASTOLIC BLOOD PRESSURE: 54 MMHG | SYSTOLIC BLOOD PRESSURE: 126 MMHG

## 2023-03-05 VITALS — DIASTOLIC BLOOD PRESSURE: 50 MMHG | SYSTOLIC BLOOD PRESSURE: 133 MMHG

## 2023-03-05 VITALS — DIASTOLIC BLOOD PRESSURE: 60 MMHG | SYSTOLIC BLOOD PRESSURE: 117 MMHG

## 2023-03-05 VITALS — SYSTOLIC BLOOD PRESSURE: 117 MMHG | DIASTOLIC BLOOD PRESSURE: 50 MMHG

## 2023-03-05 NOTE — NUR
PT RESTING IN  BED. OFFERED TO GET HER UP. STATES SHE CANNOT GET UP AS THE
LAST TIME SHE TRIED HER KNEES GAVE OUT AND SHE FELL. EDUCATED ON NEED TO SHIFT
HER WEIGHT TO ENSURE NO PRESSURE INJURY OCCURS. STATES UNDERSTANDING.

## 2023-03-05 NOTE — NUR
PT RESTING IN BED, IV VANCO INITIATED. PT DENIES ANY NEEDS OR COMPLAINTS AT
THIS TIME. CALL LIGHT IN REACH,CONTINUE TO MONITOR.

## 2023-03-05 NOTE — NUR
ASSUMED CARE OF PT. PT RESTING IN BED. DENIES ANY IMMEDIATE NEEDS. PYREK IN
PLACE. ASSESSEMENT COMPLETE. IV INFUSING WITHOUT DIFFICULTY. PERSONAL
BELONGINGS WITHIN REACH. BED IN LOWEST POSITION.

## 2023-03-05 NOTE — NUR
PT RESTING IN BED. DENIES ANY NEEDS AT THIS TIME. NO CHANGE IN ASSESSMENT.
WILL CONTINUE TO MONITOR.

## 2023-03-05 NOTE — NUR
PT IN BED WATCHING TV. BREATHING EVEN AND UNLABORED. NO S/S OF DISTRESS NOTED.
ASSESSMENT COMPLETED. PT REPORT PAIN TO 4 EXTREMITIES, PT NEDICATED PER MAR.
CALL LIGHT IN REACH AND BED IN LOWEST POSITION.

## 2023-03-05 NOTE — NUR
PT AWAKE WATCHING TV, NO SIGNS OF DISTRESS NOTED, RESP EVEN AND UNLABORED.
CALL LIGHT IN REACH,CONTINUE TO MONITOR.

## 2023-03-06 VITALS — DIASTOLIC BLOOD PRESSURE: 58 MMHG | SYSTOLIC BLOOD PRESSURE: 116 MMHG

## 2023-03-06 VITALS — SYSTOLIC BLOOD PRESSURE: 116 MMHG | DIASTOLIC BLOOD PRESSURE: 58 MMHG

## 2023-03-06 VITALS — SYSTOLIC BLOOD PRESSURE: 123 MMHG | DIASTOLIC BLOOD PRESSURE: 65 MMHG

## 2023-03-06 VITALS — SYSTOLIC BLOOD PRESSURE: 144 MMHG | DIASTOLIC BLOOD PRESSURE: 60 MMHG

## 2023-03-06 LAB
ANION GAP SERPL CALCULATED.3IONS-SCNC: 2 MMOL/L
BASOPHILS NFR BLD AUTO: 0.7 % (ref 0–3)
BUN SERPL-MCNC: 11 MG/DL (ref 8–23)
BUN/CREAT SERPL: 17
CHLORIDE SERPL-SCNC: 110 MMOL/L (ref 95–108)
CO2 SERPL-SCNC: 30 MMOL/L (ref 22–30)
CREAT SERPL-MCNC: 0.6 MG/DL (ref 0.5–1)
EOSINOPHIL NFR BLD AUTO: 4.7 % (ref 0–8)
ERYTHROCYTE [DISTWIDTH] IN BLOOD BY AUTOMATED COUNT: 18.2 % (ref 11.5–15.5)
HCT VFR BLD AUTO: 28.2 % (ref 37–47)
HGB BLD-MCNC: 8.2 G/DL (ref 12–16)
IMM GRANULOCYTES NFR BLD: 0.7 % (ref 0–5)
LYMPHOCYTES NFR BLD: 26.8 % (ref 15–41)
MAGNESIUM SERPL-MCNC: 1.6 MG/DL (ref 1.6–2.3)
MCH RBC QN AUTO: 23.4 PG  CALC (ref 26–32)
MCHC RBC AUTO-ENTMCNC: 29.1 G/DL CAL (ref 32–36)
MCV RBC AUTO: 80.3 FL  CALC (ref 80–100)
MONOCYTES NFR BLD AUTO: 11.5 % (ref 2–13)
NEUTROPHILS # BLD AUTO: 3.09 THOU/UL (ref 2–7.15)
NEUTROPHILS NFR BLD AUTO: 55.6 % (ref 42–76)
PLATELET # BLD AUTO: 175 THOU/UL (ref 130–400)
POTASSIUM SERPL-SCNC: 3.3 MMOL/L (ref 3.5–5.1)
RBC # BLD AUTO: 3.51 MILL/UL (ref 4.2–5.6)
SODIUM SERPL-SCNC: 139 MMOL/L (ref 137–146)

## 2023-03-06 NOTE — NUR
IV REMOVED WITH TIP INTACT, NO IV RELATED COMPLICATIONS NOTED. TELE REMOVED.
SPOKE TO DR. REYES AND MADE AWARE THAT THE PATIENT NEEDS TO F/U WITH [facility] TO
GET OUTPT CBC AND STOOL FOR OB, PT VERBALIZES UNDERSTANDING. DC INSTRUCTIONS
EXPLAINED TO PT, PT VERBALIZES UNDERSTANDING AND DENIES QUESTIONS. DOXYCYCLINE
PO SENT TO PATIENT'S PREFERRED PHARMACY. SENA  SET UP BY [facility] CASE
MANAGEMENT. PT DISCHARGED HOME WITH ALL PERSONAL BELONGINGS VIA  AT 1522.
RIGHT IJ REMOVED BY JONG EDGE, NO S/S OF COMPLICATIONS NOTED AT INSERTION
SITE X 30 MINUTES.

## 2023-03-06 NOTE — NUR
PT IN BED RESTING WITH EYES CLOSED BREATHING EVEN AND UNLABORED, NO S/S OF
DISTRESS NOTED. CALL LIGHT IN REACH AND BED IN LOWEST POSITION.

## 2023-03-06 NOTE — NUR
PT IN BED AWAKE BRAATHING EVEN AND UNLABORED. NO S/S OF DISTRESS NOTED,
PERINEAL CARE PROVIDE IT. NO NEEDS OR CONCERN VOICED. CALL LIGHT IN REACH AND
BED IN LOWEST POSITION.

## 2023-03-06 NOTE — NUR
0700 BEDSIDE REPORT RECEIVED FROM YOUSIF KELLY. PT EASILY AROUSED WHEN CALLED BY
NAME. NO COMPLAINTS VOICED AT THIS TIME. RESP EVEN AND UNLABORED. PT UPDATED
ON POC, VERBALIZES UNDERSTANDING AND DENIES QUESTIONS. ALL PERSONAL ITEMS
WITHIN REACH, PT ADVISED TO CALL BEFORE TRYING TO GET OOB. SAFETY PRECAUTIONS
IN PLACE.

## 2023-05-16 ENCOUNTER — HOSPITAL ENCOUNTER (OUTPATIENT)
Dept: HOSPITAL 82 - ED | Age: 73
Setting detail: OBSERVATION
LOS: 1 days | Discharge: PSYCHIATRIC OR SUBSTANCE USE FACILITY | End: 2023-05-17
Attending: INTERNAL MEDICINE | Admitting: INTERNAL MEDICINE
Payer: MEDICARE

## 2023-05-16 VITALS — SYSTOLIC BLOOD PRESSURE: 103 MMHG | DIASTOLIC BLOOD PRESSURE: 57 MMHG

## 2023-05-16 VITALS — SYSTOLIC BLOOD PRESSURE: 86 MMHG | DIASTOLIC BLOOD PRESSURE: 49 MMHG

## 2023-05-16 VITALS — HEIGHT: 62 IN | WEIGHT: 204.15 LBS | BODY MASS INDEX: 37.57 KG/M2

## 2023-05-16 VITALS — SYSTOLIC BLOOD PRESSURE: 131 MMHG | DIASTOLIC BLOOD PRESSURE: 72 MMHG

## 2023-05-16 VITALS — DIASTOLIC BLOOD PRESSURE: 67 MMHG | SYSTOLIC BLOOD PRESSURE: 107 MMHG

## 2023-05-16 VITALS — SYSTOLIC BLOOD PRESSURE: 88 MMHG | DIASTOLIC BLOOD PRESSURE: 52 MMHG

## 2023-05-16 VITALS — SYSTOLIC BLOOD PRESSURE: 92 MMHG | DIASTOLIC BLOOD PRESSURE: 53 MMHG

## 2023-05-16 VITALS — SYSTOLIC BLOOD PRESSURE: 79 MMHG | DIASTOLIC BLOOD PRESSURE: 38 MMHG

## 2023-05-16 VITALS — DIASTOLIC BLOOD PRESSURE: 53 MMHG | SYSTOLIC BLOOD PRESSURE: 88 MMHG

## 2023-05-16 VITALS — DIASTOLIC BLOOD PRESSURE: 52 MMHG | SYSTOLIC BLOOD PRESSURE: 94 MMHG

## 2023-05-16 VITALS — DIASTOLIC BLOOD PRESSURE: 61 MMHG | SYSTOLIC BLOOD PRESSURE: 89 MMHG

## 2023-05-16 VITALS — DIASTOLIC BLOOD PRESSURE: 59 MMHG | SYSTOLIC BLOOD PRESSURE: 100 MMHG

## 2023-05-16 VITALS — SYSTOLIC BLOOD PRESSURE: 76 MMHG | DIASTOLIC BLOOD PRESSURE: 47 MMHG

## 2023-05-16 VITALS — SYSTOLIC BLOOD PRESSURE: 127 MMHG | DIASTOLIC BLOOD PRESSURE: 61 MMHG

## 2023-05-16 VITALS — DIASTOLIC BLOOD PRESSURE: 51 MMHG | SYSTOLIC BLOOD PRESSURE: 80 MMHG

## 2023-05-16 VITALS — SYSTOLIC BLOOD PRESSURE: 103 MMHG | DIASTOLIC BLOOD PRESSURE: 56 MMHG

## 2023-05-16 VITALS — DIASTOLIC BLOOD PRESSURE: 75 MMHG | SYSTOLIC BLOOD PRESSURE: 112 MMHG

## 2023-05-16 VITALS — SYSTOLIC BLOOD PRESSURE: 84 MMHG | DIASTOLIC BLOOD PRESSURE: 50 MMHG

## 2023-05-16 VITALS — DIASTOLIC BLOOD PRESSURE: 50 MMHG | SYSTOLIC BLOOD PRESSURE: 87 MMHG

## 2023-05-16 VITALS — DIASTOLIC BLOOD PRESSURE: 60 MMHG | SYSTOLIC BLOOD PRESSURE: 113 MMHG

## 2023-05-16 VITALS — DIASTOLIC BLOOD PRESSURE: 61 MMHG | SYSTOLIC BLOOD PRESSURE: 116 MMHG

## 2023-05-16 VITALS — DIASTOLIC BLOOD PRESSURE: 27 MMHG | SYSTOLIC BLOOD PRESSURE: 73 MMHG

## 2023-05-16 VITALS — SYSTOLIC BLOOD PRESSURE: 98 MMHG | DIASTOLIC BLOOD PRESSURE: 54 MMHG

## 2023-05-16 VITALS — SYSTOLIC BLOOD PRESSURE: 100 MMHG | DIASTOLIC BLOOD PRESSURE: 62 MMHG

## 2023-05-16 VITALS — SYSTOLIC BLOOD PRESSURE: 85 MMHG | DIASTOLIC BLOOD PRESSURE: 53 MMHG

## 2023-05-16 VITALS — SYSTOLIC BLOOD PRESSURE: 98 MMHG | DIASTOLIC BLOOD PRESSURE: 56 MMHG

## 2023-05-16 VITALS — DIASTOLIC BLOOD PRESSURE: 72 MMHG | SYSTOLIC BLOOD PRESSURE: 111 MMHG

## 2023-05-16 VITALS — SYSTOLIC BLOOD PRESSURE: 77 MMHG | DIASTOLIC BLOOD PRESSURE: 49 MMHG

## 2023-05-16 VITALS — DIASTOLIC BLOOD PRESSURE: 56 MMHG | SYSTOLIC BLOOD PRESSURE: 99 MMHG

## 2023-05-16 VITALS — SYSTOLIC BLOOD PRESSURE: 89 MMHG | DIASTOLIC BLOOD PRESSURE: 44 MMHG

## 2023-05-16 VITALS — SYSTOLIC BLOOD PRESSURE: 98 MMHG | DIASTOLIC BLOOD PRESSURE: 59 MMHG

## 2023-05-16 VITALS — DIASTOLIC BLOOD PRESSURE: 68 MMHG | SYSTOLIC BLOOD PRESSURE: 123 MMHG

## 2023-05-16 VITALS — SYSTOLIC BLOOD PRESSURE: 68 MMHG | DIASTOLIC BLOOD PRESSURE: 49 MMHG

## 2023-05-16 DIAGNOSIS — G89.4: ICD-10-CM

## 2023-05-16 DIAGNOSIS — I48.91: ICD-10-CM

## 2023-05-16 DIAGNOSIS — G62.9: ICD-10-CM

## 2023-05-16 DIAGNOSIS — I25.10: ICD-10-CM

## 2023-05-16 DIAGNOSIS — Z99.81: ICD-10-CM

## 2023-05-16 DIAGNOSIS — I50.32: ICD-10-CM

## 2023-05-16 DIAGNOSIS — K21.9: ICD-10-CM

## 2023-05-16 DIAGNOSIS — E66.01: ICD-10-CM

## 2023-05-16 DIAGNOSIS — Z79.891: ICD-10-CM

## 2023-05-16 DIAGNOSIS — I11.0: ICD-10-CM

## 2023-05-16 DIAGNOSIS — J44.9: ICD-10-CM

## 2023-05-16 DIAGNOSIS — F32.A: ICD-10-CM

## 2023-05-16 DIAGNOSIS — Z87.440: ICD-10-CM

## 2023-05-16 DIAGNOSIS — Z98.84: ICD-10-CM

## 2023-05-16 DIAGNOSIS — Z20.822: ICD-10-CM

## 2023-05-16 DIAGNOSIS — E03.9: ICD-10-CM

## 2023-05-16 DIAGNOSIS — Z87.11: ICD-10-CM

## 2023-05-16 DIAGNOSIS — G47.30: ICD-10-CM

## 2023-05-16 DIAGNOSIS — F41.9: ICD-10-CM

## 2023-05-16 DIAGNOSIS — J10.1: Primary | ICD-10-CM

## 2023-05-16 DIAGNOSIS — Z91.81: ICD-10-CM

## 2023-05-16 LAB
ALBUMIN SERPL-MCNC: 3.2 G/DL (ref 3.2–5)
ALP SERPL-CCNC: 106 U/L (ref 38–126)
ANION GAP SERPL CALCULATED.3IONS-SCNC: 5 MMOL/L
AST SERPL-CCNC: 24 U/L (ref 9–36)
BASOPHILS NFR BLD AUTO: 0.2 % (ref 0–3)
BILIRUB UR QL STRIP.AUTO: NEGATIVE
BUN SERPL-MCNC: 19 MG/DL (ref 8–23)
BUN/CREAT SERPL: 23
CHLORIDE SERPL-SCNC: 101 MMOL/L (ref 95–108)
CO2 SERPL-SCNC: 36 MMOL/L (ref 22–30)
COLOR UR AUTO: YELLOW
CREAT SERPL-MCNC: 0.8 MG/DL (ref 0.5–1)
EOSINOPHIL NFR BLD AUTO: 1.3 % (ref 0–8)
ERYTHROCYTE [DISTWIDTH] IN BLOOD BY AUTOMATED COUNT: 17 % (ref 11.5–15.5)
GLUCOSE UR STRIP.AUTO-MCNC: NEGATIVE MG/DL
HCT VFR BLD AUTO: 38.2 % (ref 37–47)
HGB BLD-MCNC: 11.3 G/DL (ref 12–16)
HGB UR QL STRIP.AUTO: (no result)
IMM GRANULOCYTES NFR BLD: 0.2 % (ref 0–5)
KETONES UR STRIP.AUTO-MCNC: NEGATIVE MG/DL
LEUKOCYTE ESTERASE UR QL STRIP.AUTO: NEGATIVE
LYMPHOCYTES NFR BLD: 10.5 % (ref 15–41)
MCH RBC QN AUTO: 24.6 PG  CALC (ref 26–32)
MCHC RBC AUTO-ENTMCNC: 29.6 G/DL CAL (ref 32–36)
MCV RBC AUTO: 83 FL  CALC (ref 80–100)
MONOCYTES NFR BLD AUTO: 7.2 % (ref 2–13)
NEUTROPHILS # BLD AUTO: 7.26 THOU/UL (ref 2–7.15)
NEUTROPHILS NFR BLD AUTO: 80.6 % (ref 42–76)
NITRITE UR QL STRIP.AUTO: NEGATIVE
PH UR STRIP.AUTO: 5.5 [PH] (ref 4.5–8)
PLATELET # BLD AUTO: 129 THOU/UL (ref 130–400)
POTASSIUM SERPL-SCNC: 3.6 MMOL/L (ref 3.5–5.1)
PROT SERPL-MCNC: 6.1 G/DL (ref 6.3–8.2)
PROT UR QL STRIP.AUTO: NEGATIVE MG/DL
RBC # BLD AUTO: 4.6 MILL/UL (ref 4.2–5.6)
SODIUM SERPL-SCNC: 138 MMOL/L (ref 137–146)
SP GR UR STRIP.AUTO: 1.02
UROBILINOGEN UR QL STRIP.AUTO: 0.2 E.U./DL

## 2023-05-17 VITALS — SYSTOLIC BLOOD PRESSURE: 115 MMHG | DIASTOLIC BLOOD PRESSURE: 58 MMHG

## 2023-05-17 VITALS — DIASTOLIC BLOOD PRESSURE: 62 MMHG | SYSTOLIC BLOOD PRESSURE: 112 MMHG

## 2023-05-17 VITALS — DIASTOLIC BLOOD PRESSURE: 58 MMHG | SYSTOLIC BLOOD PRESSURE: 102 MMHG

## 2023-05-30 NOTE — NUR
PT LAYING IN BED WITH EYES CLOSED, APPEARS TO BE SLEEPING, APPEARS COMFORTABLE
AND IN NO DISTRESS. RESPIRATIONS REGULAR AND UNLABORED. ITEMS REMAIN WITHIN
REACH, CALL BELL REMAINS WITHIN REACH. BED REMAINS LOCKED AND IN LOW POSITION
WITH BEDRAILS UP X2. WILL CONTINUE TO MONITOR. Detail Level: Detailed

## 2023-06-07 NOTE — NUR
Patient is seen for exercises and bed mobility. She received a copy of HEP
including gluteal sets, quad sets and bridging.
Her Am Pac score is unchanged. She remains a good rehab candidate and will be
working on bed to chair transfers and sit to [facility].
We plan to continue strengthening and progress transfers as tolerated None available

## 2023-06-12 ENCOUNTER — HOSPITAL ENCOUNTER (OUTPATIENT)
Dept: HOSPITAL 82 - ED | Age: 73
Setting detail: OBSERVATION
LOS: 1 days | Discharge: PSYCHIATRIC OR SUBSTANCE USE FACILITY | End: 2023-06-13
Attending: INTERNAL MEDICINE | Admitting: INTERNAL MEDICINE
Payer: MEDICARE

## 2023-06-12 VITALS — SYSTOLIC BLOOD PRESSURE: 91 MMHG | DIASTOLIC BLOOD PRESSURE: 55 MMHG

## 2023-06-12 VITALS — SYSTOLIC BLOOD PRESSURE: 93 MMHG | DIASTOLIC BLOOD PRESSURE: 61 MMHG

## 2023-06-12 VITALS — SYSTOLIC BLOOD PRESSURE: 82 MMHG | DIASTOLIC BLOOD PRESSURE: 48 MMHG

## 2023-06-12 VITALS — DIASTOLIC BLOOD PRESSURE: 58 MMHG | SYSTOLIC BLOOD PRESSURE: 89 MMHG

## 2023-06-12 VITALS — DIASTOLIC BLOOD PRESSURE: 60 MMHG | SYSTOLIC BLOOD PRESSURE: 107 MMHG

## 2023-06-12 VITALS — SYSTOLIC BLOOD PRESSURE: 83 MMHG | DIASTOLIC BLOOD PRESSURE: 53 MMHG

## 2023-06-12 VITALS — SYSTOLIC BLOOD PRESSURE: 97 MMHG | DIASTOLIC BLOOD PRESSURE: 59 MMHG

## 2023-06-12 VITALS — DIASTOLIC BLOOD PRESSURE: 56 MMHG | SYSTOLIC BLOOD PRESSURE: 89 MMHG

## 2023-06-12 VITALS — DIASTOLIC BLOOD PRESSURE: 75 MMHG | SYSTOLIC BLOOD PRESSURE: 104 MMHG

## 2023-06-12 VITALS — SYSTOLIC BLOOD PRESSURE: 92 MMHG | DIASTOLIC BLOOD PRESSURE: 53 MMHG

## 2023-06-12 VITALS — DIASTOLIC BLOOD PRESSURE: 33 MMHG | SYSTOLIC BLOOD PRESSURE: 86 MMHG

## 2023-06-12 VITALS — SYSTOLIC BLOOD PRESSURE: 98 MMHG | DIASTOLIC BLOOD PRESSURE: 69 MMHG

## 2023-06-12 VITALS — SYSTOLIC BLOOD PRESSURE: 89 MMHG | DIASTOLIC BLOOD PRESSURE: 58 MMHG

## 2023-06-12 VITALS — DIASTOLIC BLOOD PRESSURE: 58 MMHG | SYSTOLIC BLOOD PRESSURE: 114 MMHG

## 2023-06-12 VITALS — SYSTOLIC BLOOD PRESSURE: 85 MMHG | DIASTOLIC BLOOD PRESSURE: 42 MMHG

## 2023-06-12 VITALS — SYSTOLIC BLOOD PRESSURE: 93 MMHG | DIASTOLIC BLOOD PRESSURE: 54 MMHG

## 2023-06-12 VITALS — SYSTOLIC BLOOD PRESSURE: 79 MMHG | DIASTOLIC BLOOD PRESSURE: 50 MMHG

## 2023-06-12 VITALS — SYSTOLIC BLOOD PRESSURE: 107 MMHG | DIASTOLIC BLOOD PRESSURE: 65 MMHG

## 2023-06-12 VITALS — DIASTOLIC BLOOD PRESSURE: 60 MMHG | SYSTOLIC BLOOD PRESSURE: 111 MMHG

## 2023-06-12 VITALS — SYSTOLIC BLOOD PRESSURE: 91 MMHG | DIASTOLIC BLOOD PRESSURE: 57 MMHG

## 2023-06-12 VITALS — DIASTOLIC BLOOD PRESSURE: 57 MMHG | SYSTOLIC BLOOD PRESSURE: 100 MMHG

## 2023-06-12 VITALS — DIASTOLIC BLOOD PRESSURE: 67 MMHG | SYSTOLIC BLOOD PRESSURE: 90 MMHG

## 2023-06-12 VITALS — SYSTOLIC BLOOD PRESSURE: 85 MMHG | DIASTOLIC BLOOD PRESSURE: 50 MMHG

## 2023-06-12 VITALS — SYSTOLIC BLOOD PRESSURE: 93 MMHG | DIASTOLIC BLOOD PRESSURE: 57 MMHG

## 2023-06-12 VITALS — SYSTOLIC BLOOD PRESSURE: 88 MMHG | DIASTOLIC BLOOD PRESSURE: 54 MMHG

## 2023-06-12 VITALS — DIASTOLIC BLOOD PRESSURE: 68 MMHG | SYSTOLIC BLOOD PRESSURE: 114 MMHG

## 2023-06-12 VITALS — DIASTOLIC BLOOD PRESSURE: 47 MMHG | SYSTOLIC BLOOD PRESSURE: 79 MMHG

## 2023-06-12 VITALS — DIASTOLIC BLOOD PRESSURE: 65 MMHG | SYSTOLIC BLOOD PRESSURE: 113 MMHG

## 2023-06-12 VITALS — SYSTOLIC BLOOD PRESSURE: 90 MMHG | DIASTOLIC BLOOD PRESSURE: 69 MMHG

## 2023-06-12 VITALS — DIASTOLIC BLOOD PRESSURE: 52 MMHG | SYSTOLIC BLOOD PRESSURE: 93 MMHG

## 2023-06-12 VITALS — DIASTOLIC BLOOD PRESSURE: 57 MMHG | SYSTOLIC BLOOD PRESSURE: 108 MMHG

## 2023-06-12 VITALS — SYSTOLIC BLOOD PRESSURE: 120 MMHG | DIASTOLIC BLOOD PRESSURE: 73 MMHG

## 2023-06-12 VITALS — DIASTOLIC BLOOD PRESSURE: 64 MMHG | SYSTOLIC BLOOD PRESSURE: 98 MMHG

## 2023-06-12 VITALS — SYSTOLIC BLOOD PRESSURE: 81 MMHG | DIASTOLIC BLOOD PRESSURE: 67 MMHG

## 2023-06-12 VITALS — DIASTOLIC BLOOD PRESSURE: 65 MMHG | SYSTOLIC BLOOD PRESSURE: 104 MMHG

## 2023-06-12 VITALS — SYSTOLIC BLOOD PRESSURE: 106 MMHG | DIASTOLIC BLOOD PRESSURE: 63 MMHG

## 2023-06-12 VITALS — SYSTOLIC BLOOD PRESSURE: 87 MMHG | DIASTOLIC BLOOD PRESSURE: 39 MMHG

## 2023-06-12 VITALS — DIASTOLIC BLOOD PRESSURE: 55 MMHG | SYSTOLIC BLOOD PRESSURE: 87 MMHG

## 2023-06-12 VITALS — SYSTOLIC BLOOD PRESSURE: 86 MMHG | DIASTOLIC BLOOD PRESSURE: 52 MMHG

## 2023-06-12 VITALS — DIASTOLIC BLOOD PRESSURE: 52 MMHG | SYSTOLIC BLOOD PRESSURE: 89 MMHG

## 2023-06-12 VITALS — SYSTOLIC BLOOD PRESSURE: 86 MMHG | DIASTOLIC BLOOD PRESSURE: 53 MMHG

## 2023-06-12 VITALS — BODY MASS INDEX: 37.89 KG/M2 | WEIGHT: 205.91 LBS | HEIGHT: 62 IN

## 2023-06-12 VITALS — SYSTOLIC BLOOD PRESSURE: 94 MMHG | DIASTOLIC BLOOD PRESSURE: 55 MMHG

## 2023-06-12 VITALS — SYSTOLIC BLOOD PRESSURE: 91 MMHG | DIASTOLIC BLOOD PRESSURE: 40 MMHG

## 2023-06-12 VITALS — SYSTOLIC BLOOD PRESSURE: 87 MMHG | DIASTOLIC BLOOD PRESSURE: 55 MMHG

## 2023-06-12 VITALS — SYSTOLIC BLOOD PRESSURE: 111 MMHG | DIASTOLIC BLOOD PRESSURE: 63 MMHG

## 2023-06-12 VITALS — SYSTOLIC BLOOD PRESSURE: 102 MMHG | DIASTOLIC BLOOD PRESSURE: 54 MMHG

## 2023-06-12 VITALS — SYSTOLIC BLOOD PRESSURE: 105 MMHG | DIASTOLIC BLOOD PRESSURE: 66 MMHG

## 2023-06-12 VITALS — SYSTOLIC BLOOD PRESSURE: 85 MMHG | DIASTOLIC BLOOD PRESSURE: 56 MMHG

## 2023-06-12 VITALS — SYSTOLIC BLOOD PRESSURE: 90 MMHG | DIASTOLIC BLOOD PRESSURE: 55 MMHG

## 2023-06-12 VITALS — DIASTOLIC BLOOD PRESSURE: 51 MMHG | SYSTOLIC BLOOD PRESSURE: 85 MMHG

## 2023-06-12 VITALS — SYSTOLIC BLOOD PRESSURE: 80 MMHG | DIASTOLIC BLOOD PRESSURE: 47 MMHG

## 2023-06-12 VITALS — DIASTOLIC BLOOD PRESSURE: 40 MMHG | SYSTOLIC BLOOD PRESSURE: 97 MMHG

## 2023-06-12 VITALS — DIASTOLIC BLOOD PRESSURE: 46 MMHG | SYSTOLIC BLOOD PRESSURE: 78 MMHG

## 2023-06-12 VITALS — DIASTOLIC BLOOD PRESSURE: 37 MMHG | SYSTOLIC BLOOD PRESSURE: 80 MMHG

## 2023-06-12 VITALS — DIASTOLIC BLOOD PRESSURE: 53 MMHG | SYSTOLIC BLOOD PRESSURE: 93 MMHG

## 2023-06-12 VITALS — SYSTOLIC BLOOD PRESSURE: 90 MMHG | DIASTOLIC BLOOD PRESSURE: 52 MMHG

## 2023-06-12 VITALS — DIASTOLIC BLOOD PRESSURE: 52 MMHG | SYSTOLIC BLOOD PRESSURE: 83 MMHG

## 2023-06-12 VITALS — DIASTOLIC BLOOD PRESSURE: 67 MMHG | SYSTOLIC BLOOD PRESSURE: 95 MMHG

## 2023-06-12 VITALS — SYSTOLIC BLOOD PRESSURE: 93 MMHG | DIASTOLIC BLOOD PRESSURE: 55 MMHG

## 2023-06-12 VITALS — SYSTOLIC BLOOD PRESSURE: 74 MMHG | DIASTOLIC BLOOD PRESSURE: 50 MMHG

## 2023-06-12 VITALS — SYSTOLIC BLOOD PRESSURE: 80 MMHG | DIASTOLIC BLOOD PRESSURE: 46 MMHG

## 2023-06-12 VITALS — DIASTOLIC BLOOD PRESSURE: 82 MMHG | SYSTOLIC BLOOD PRESSURE: 111 MMHG

## 2023-06-12 VITALS — DIASTOLIC BLOOD PRESSURE: 49 MMHG | SYSTOLIC BLOOD PRESSURE: 85 MMHG

## 2023-06-12 VITALS — SYSTOLIC BLOOD PRESSURE: 100 MMHG | DIASTOLIC BLOOD PRESSURE: 71 MMHG

## 2023-06-12 DIAGNOSIS — J44.9: ICD-10-CM
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
## 2023-06-12 DIAGNOSIS — J96.10: ICD-10-CM

## 2023-06-12 DIAGNOSIS — G47.30: ICD-10-CM

## 2023-06-12 DIAGNOSIS — Z79.891: ICD-10-CM

## 2023-06-12 DIAGNOSIS — E03.9: ICD-10-CM

## 2023-06-12 DIAGNOSIS — I95.9: ICD-10-CM

## 2023-06-12 DIAGNOSIS — J10.1: Primary | ICD-10-CM

## 2023-06-12 DIAGNOSIS — F32.A: ICD-10-CM

## 2023-06-12 DIAGNOSIS — Z79.01: ICD-10-CM

## 2023-06-12 DIAGNOSIS — G89.4: ICD-10-CM

## 2023-06-12 DIAGNOSIS — K21.9: ICD-10-CM

## 2023-06-12 DIAGNOSIS — Z98.84: ICD-10-CM
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
## 2023-06-12 DIAGNOSIS — I50.32: ICD-10-CM

## 2023-06-12 DIAGNOSIS — Z87.11: ICD-10-CM

## 2023-06-12 DIAGNOSIS — F41.9: ICD-10-CM

## 2023-06-12 DIAGNOSIS — Z99.81: ICD-10-CM

## 2023-06-12 DIAGNOSIS — E66.01: ICD-10-CM

## 2023-06-12 DIAGNOSIS — S00.93XA: ICD-10-CM

## 2023-06-12 DIAGNOSIS — I48.91: ICD-10-CM

## 2023-06-12 DIAGNOSIS — I25.10: ICD-10-CM

## 2023-06-12 LAB
ALBUMIN SERPL-MCNC: 3.3 G/DL (ref 3.2–5)
ALP SERPL-CCNC: 112 U/L (ref 38–126)
ANION GAP SERPL CALCULATED.3IONS-SCNC: 9 MMOL/L
AST SERPL-CCNC: 26 U/L (ref 9–36)
BASOPHILS NFR BLD AUTO: 0.2 % (ref 0–3)
BILIRUB UR QL STRIP.AUTO: NEGATIVE
BUN SERPL-MCNC: 27 MG/DL (ref 8–23)
BUN/CREAT SERPL: 23
CHLORIDE SERPL-SCNC: 104 MMOL/L (ref 95–108)
CO2 SERPL-SCNC: 30 MMOL/L (ref 22–30)
COLOR UR AUTO: YELLOW
CREAT SERPL-MCNC: 1.2 MG/DL (ref 0.5–1)
EOSINOPHIL NFR BLD AUTO: 0.6 % (ref 0–8)
ERYTHROCYTE [DISTWIDTH] IN BLOOD BY AUTOMATED COUNT: 15.9 % (ref 11.5–15.5)
GLUCOSE UR STRIP.AUTO-MCNC: NEGATIVE MG/DL
HCT VFR BLD AUTO: 36 % (ref 37–47)
HGB BLD-MCNC: 10.5 G/DL (ref 12–16)
HGB UR QL STRIP.AUTO: NEGATIVE
IMM GRANULOCYTES NFR BLD: 0.3 % (ref 0–5)
KETONES UR STRIP.AUTO-MCNC: NEGATIVE MG/DL
LEUKOCYTE ESTERASE UR QL STRIP.AUTO: NEGATIVE
LYMPHOCYTES NFR BLD: 7.1 % (ref 15–41)
MCH RBC QN AUTO: 24 PG  CALC (ref 26–32)
MCHC RBC AUTO-ENTMCNC: 29.2 G/DL CAL (ref 32–36)
MCV RBC AUTO: 82.4 FL  CALC (ref 80–100)
MONOCYTES NFR BLD AUTO: 6.1 % (ref 2–13)
NEUTROPHILS # BLD AUTO: 14.86 THOU/UL (ref 2–7.15)
NEUTROPHILS NFR BLD AUTO: 85.7 % (ref 42–76)
NITRITE UR QL STRIP.AUTO: NEGATIVE
PH UR STRIP.AUTO: 5.5 [PH] (ref 4.5–8)
PLATELET # BLD AUTO: 120 THOU/UL (ref 130–400)
POTASSIUM SERPL-SCNC: 3.8 MMOL/L (ref 3.5–5.1)
PROT SERPL-MCNC: 6.4 G/DL (ref 6.3–8.2)
PROT UR QL STRIP.AUTO: (no result) MG/DL
RBC # BLD AUTO: 4.37 MILL/UL (ref 4.2–5.6)
SODIUM SERPL-SCNC: 139 MMOL/L (ref 137–146)
SP GR UR STRIP.AUTO: >=1.03
UROBILINOGEN UR QL STRIP.AUTO: 1 E.U./DL

## 2023-06-13 VITALS — DIASTOLIC BLOOD PRESSURE: 43 MMHG | SYSTOLIC BLOOD PRESSURE: 99 MMHG

## 2023-06-13 VITALS — DIASTOLIC BLOOD PRESSURE: 53 MMHG | SYSTOLIC BLOOD PRESSURE: 106 MMHG

## 2023-06-13 VITALS — SYSTOLIC BLOOD PRESSURE: 124 MMHG | DIASTOLIC BLOOD PRESSURE: 65 MMHG

## 2023-06-13 LAB
ALBUMIN SERPL-MCNC: 2.9 G/DL (ref 3.2–5)
ALP SERPL-CCNC: 109 U/L (ref 38–126)
ANION GAP SERPL CALCULATED.3IONS-SCNC: 5 MMOL/L
AST SERPL-CCNC: 21 U/L (ref 9–36)
BASOPHILS NFR BLD AUTO: 0.2 % (ref 0–3)
BUN SERPL-MCNC: 19 MG/DL (ref 8–23)
BUN/CREAT SERPL: 21
CHLORIDE SERPL-SCNC: 111 MMOL/L (ref 95–108)
CO2 SERPL-SCNC: 28 MMOL/L (ref 22–30)
CREAT SERPL-MCNC: 0.9 MG/DL (ref 0.5–1)
EOSINOPHIL NFR BLD AUTO: 1.6 % (ref 0–8)
ERYTHROCYTE [DISTWIDTH] IN BLOOD BY AUTOMATED COUNT: 16.2 % (ref 11.5–15.5)
HCT VFR BLD AUTO: 35.2 % (ref 37–47)
HGB BLD-MCNC: 10.1 G/DL (ref 12–16)
IMM GRANULOCYTES NFR BLD: 0.2 % (ref 0–5)
LYMPHOCYTES NFR BLD: 8.5 % (ref 15–41)
MAGNESIUM SERPL-MCNC: 1.7 MG/DL (ref 1.6–2.3)
MCH RBC QN AUTO: 23.8 PG  CALC (ref 26–32)
MCHC RBC AUTO-ENTMCNC: 28.7 G/DL CAL (ref 32–36)
MCV RBC AUTO: 83 FL  CALC (ref 80–100)
MONOCYTES NFR BLD AUTO: 4.9 % (ref 2–13)
NEUTROPHILS # BLD AUTO: 8.72 THOU/UL (ref 2–7.15)
NEUTROPHILS NFR BLD AUTO: 84.6 % (ref 42–76)
PLATELET # BLD AUTO: 103 THOU/UL (ref 130–400)
POTASSIUM SERPL-SCNC: 3.8 MMOL/L (ref 3.5–5.1)
PROT SERPL-MCNC: 6 G/DL (ref 6.3–8.2)
RBC # BLD AUTO: 4.24 MILL/UL (ref 4.2–5.6)
SODIUM SERPL-SCNC: 140 MMOL/L (ref 137–146)

## 2023-07-10 ENCOUNTER — HOSPITAL ENCOUNTER (INPATIENT)
Dept: HOSPITAL 82 - ED | Age: 73
LOS: 4 days | Discharge: PSYCHIATRIC OR SUBSTANCE USE FACILITY | DRG: 871 | End: 2023-07-14
Attending: INTERNAL MEDICINE | Admitting: INTERNAL MEDICINE
Payer: MEDICARE

## 2023-07-10 VITALS — SYSTOLIC BLOOD PRESSURE: 150 MMHG | DIASTOLIC BLOOD PRESSURE: 135 MMHG

## 2023-07-10 VITALS — SYSTOLIC BLOOD PRESSURE: 71 MMHG | DIASTOLIC BLOOD PRESSURE: 54 MMHG

## 2023-07-10 VITALS — DIASTOLIC BLOOD PRESSURE: 114 MMHG | SYSTOLIC BLOOD PRESSURE: 132 MMHG

## 2023-07-10 VITALS — WEIGHT: 201.94 LBS | BODY MASS INDEX: 37.16 KG/M2 | HEIGHT: 62 IN

## 2023-07-10 VITALS — SYSTOLIC BLOOD PRESSURE: 81 MMHG | DIASTOLIC BLOOD PRESSURE: 48 MMHG

## 2023-07-10 VITALS — SYSTOLIC BLOOD PRESSURE: 85 MMHG | DIASTOLIC BLOOD PRESSURE: 43 MMHG

## 2023-07-10 VITALS — SYSTOLIC BLOOD PRESSURE: 90 MMHG | DIASTOLIC BLOOD PRESSURE: 61 MMHG

## 2023-07-10 DIAGNOSIS — E03.9: ICD-10-CM

## 2023-07-10 DIAGNOSIS — Z79.891: ICD-10-CM

## 2023-07-10 DIAGNOSIS — M19.90: ICD-10-CM

## 2023-07-10 DIAGNOSIS — E66.01: ICD-10-CM

## 2023-07-10 DIAGNOSIS — F32.A: ICD-10-CM

## 2023-07-10 DIAGNOSIS — A41.9: Primary | ICD-10-CM

## 2023-07-10 DIAGNOSIS — D64.9: ICD-10-CM

## 2023-07-10 DIAGNOSIS — G93.41: ICD-10-CM

## 2023-07-10 DIAGNOSIS — Z87.440: ICD-10-CM

## 2023-07-10 DIAGNOSIS — J96.21: ICD-10-CM

## 2023-07-10 DIAGNOSIS — Z87.11: ICD-10-CM

## 2023-07-10 DIAGNOSIS — Z20.822: ICD-10-CM

## 2023-07-10 DIAGNOSIS — R19.5: ICD-10-CM

## 2023-07-10 DIAGNOSIS — I25.10: ICD-10-CM

## 2023-07-10 DIAGNOSIS — Z91.81: ICD-10-CM

## 2023-07-10 DIAGNOSIS — F41.9: ICD-10-CM

## 2023-07-10 DIAGNOSIS — I50.32: ICD-10-CM

## 2023-07-10 DIAGNOSIS — K21.9: ICD-10-CM

## 2023-07-10 DIAGNOSIS — J44.0: ICD-10-CM

## 2023-07-10 DIAGNOSIS — Z79.01: ICD-10-CM

## 2023-07-10 DIAGNOSIS — I11.0: ICD-10-CM

## 2023-07-10 DIAGNOSIS — G89.29: ICD-10-CM

## 2023-07-10 DIAGNOSIS — R65.21: ICD-10-CM

## 2023-07-10 DIAGNOSIS — Z98.84: ICD-10-CM

## 2023-07-10 DIAGNOSIS — I95.9: ICD-10-CM

## 2023-07-10 DIAGNOSIS — Z99.81: ICD-10-CM

## 2023-07-10 DIAGNOSIS — J18.9: ICD-10-CM

## 2023-07-10 DIAGNOSIS — I48.91: ICD-10-CM

## 2023-07-10 LAB
BASOPHILS NFR BLD AUTO: 0.3 % (ref 0–3)
EOSINOPHIL NFR BLD AUTO: 0 % (ref 0–8)
ERYTHROCYTE [DISTWIDTH] IN BLOOD BY AUTOMATED COUNT: 19.3 % (ref 11.5–15.5)
HCT VFR BLD AUTO: 42.4 % (ref 37–47)
HGB BLD-MCNC: 12.4 G/DL (ref 12–16)
IMM GRANULOCYTES NFR BLD: 0.3 % (ref 0–5)
LYMPHOCYTES NFR BLD: 5.2 % (ref 15–41)
MCH RBC QN AUTO: 24.2 PG  CALC (ref 26–32)
MCHC RBC AUTO-ENTMCNC: 29.2 G/DL CAL (ref 32–36)
MCV RBC AUTO: 82.7 FL  CALC (ref 80–100)
MONOCYTES NFR BLD AUTO: 6.8 % (ref 2–13)
NEUTROPHILS # BLD AUTO: 18.13 THOU/UL (ref 2–7.15)
NEUTROPHILS NFR BLD AUTO: 87.4 % (ref 42–76)
PLATELET # BLD AUTO: 319 THOU/UL (ref 130–400)
RBC # BLD AUTO: 5.13 MILL/UL (ref 4.2–5.6)

## 2023-07-11 VITALS — DIASTOLIC BLOOD PRESSURE: 56 MMHG | SYSTOLIC BLOOD PRESSURE: 96 MMHG

## 2023-07-11 VITALS — DIASTOLIC BLOOD PRESSURE: 64 MMHG | SYSTOLIC BLOOD PRESSURE: 90 MMHG

## 2023-07-11 VITALS — DIASTOLIC BLOOD PRESSURE: 55 MMHG | SYSTOLIC BLOOD PRESSURE: 108 MMHG

## 2023-07-11 VITALS — SYSTOLIC BLOOD PRESSURE: 86 MMHG | DIASTOLIC BLOOD PRESSURE: 38 MMHG

## 2023-07-11 VITALS — DIASTOLIC BLOOD PRESSURE: 53 MMHG | SYSTOLIC BLOOD PRESSURE: 106 MMHG

## 2023-07-11 VITALS — SYSTOLIC BLOOD PRESSURE: 113 MMHG | DIASTOLIC BLOOD PRESSURE: 68 MMHG

## 2023-07-11 VITALS — SYSTOLIC BLOOD PRESSURE: 98 MMHG | DIASTOLIC BLOOD PRESSURE: 61 MMHG

## 2023-07-11 VITALS — DIASTOLIC BLOOD PRESSURE: 61 MMHG | SYSTOLIC BLOOD PRESSURE: 102 MMHG

## 2023-07-11 VITALS — DIASTOLIC BLOOD PRESSURE: 57 MMHG | SYSTOLIC BLOOD PRESSURE: 101 MMHG

## 2023-07-11 VITALS — DIASTOLIC BLOOD PRESSURE: 48 MMHG | SYSTOLIC BLOOD PRESSURE: 84 MMHG

## 2023-07-11 VITALS — SYSTOLIC BLOOD PRESSURE: 103 MMHG | DIASTOLIC BLOOD PRESSURE: 64 MMHG

## 2023-07-11 VITALS — DIASTOLIC BLOOD PRESSURE: 66 MMHG | SYSTOLIC BLOOD PRESSURE: 108 MMHG

## 2023-07-11 VITALS — DIASTOLIC BLOOD PRESSURE: 61 MMHG | SYSTOLIC BLOOD PRESSURE: 91 MMHG

## 2023-07-11 VITALS — DIASTOLIC BLOOD PRESSURE: 50 MMHG | SYSTOLIC BLOOD PRESSURE: 78 MMHG

## 2023-07-11 VITALS — SYSTOLIC BLOOD PRESSURE: 118 MMHG | DIASTOLIC BLOOD PRESSURE: 71 MMHG

## 2023-07-11 VITALS — SYSTOLIC BLOOD PRESSURE: 80 MMHG | DIASTOLIC BLOOD PRESSURE: 44 MMHG

## 2023-07-11 VITALS — DIASTOLIC BLOOD PRESSURE: 59 MMHG | SYSTOLIC BLOOD PRESSURE: 79 MMHG

## 2023-07-11 VITALS — SYSTOLIC BLOOD PRESSURE: 99 MMHG | DIASTOLIC BLOOD PRESSURE: 56 MMHG

## 2023-07-11 VITALS — DIASTOLIC BLOOD PRESSURE: 67 MMHG | SYSTOLIC BLOOD PRESSURE: 101 MMHG

## 2023-07-11 VITALS — SYSTOLIC BLOOD PRESSURE: 127 MMHG | DIASTOLIC BLOOD PRESSURE: 105 MMHG

## 2023-07-11 VITALS — SYSTOLIC BLOOD PRESSURE: 101 MMHG | DIASTOLIC BLOOD PRESSURE: 64 MMHG

## 2023-07-11 VITALS — DIASTOLIC BLOOD PRESSURE: 65 MMHG | SYSTOLIC BLOOD PRESSURE: 104 MMHG

## 2023-07-11 VITALS — SYSTOLIC BLOOD PRESSURE: 108 MMHG | DIASTOLIC BLOOD PRESSURE: 63 MMHG

## 2023-07-11 VITALS — SYSTOLIC BLOOD PRESSURE: 103 MMHG | DIASTOLIC BLOOD PRESSURE: 70 MMHG

## 2023-07-11 VITALS — DIASTOLIC BLOOD PRESSURE: 73 MMHG | SYSTOLIC BLOOD PRESSURE: 91 MMHG

## 2023-07-11 VITALS — DIASTOLIC BLOOD PRESSURE: 65 MMHG | SYSTOLIC BLOOD PRESSURE: 99 MMHG

## 2023-07-11 VITALS — SYSTOLIC BLOOD PRESSURE: 115 MMHG | DIASTOLIC BLOOD PRESSURE: 67 MMHG

## 2023-07-11 VITALS — DIASTOLIC BLOOD PRESSURE: 61 MMHG | SYSTOLIC BLOOD PRESSURE: 106 MMHG

## 2023-07-11 VITALS — DIASTOLIC BLOOD PRESSURE: 62 MMHG | SYSTOLIC BLOOD PRESSURE: 109 MMHG

## 2023-07-11 VITALS — SYSTOLIC BLOOD PRESSURE: 87 MMHG | DIASTOLIC BLOOD PRESSURE: 56 MMHG

## 2023-07-11 VITALS — DIASTOLIC BLOOD PRESSURE: 71 MMHG | SYSTOLIC BLOOD PRESSURE: 108 MMHG

## 2023-07-11 VITALS — SYSTOLIC BLOOD PRESSURE: 97 MMHG | DIASTOLIC BLOOD PRESSURE: 51 MMHG

## 2023-07-11 VITALS — SYSTOLIC BLOOD PRESSURE: 108 MMHG | DIASTOLIC BLOOD PRESSURE: 66 MMHG

## 2023-07-11 VITALS — SYSTOLIC BLOOD PRESSURE: 107 MMHG | DIASTOLIC BLOOD PRESSURE: 67 MMHG

## 2023-07-11 VITALS — DIASTOLIC BLOOD PRESSURE: 44 MMHG | SYSTOLIC BLOOD PRESSURE: 74 MMHG

## 2023-07-11 VITALS — SYSTOLIC BLOOD PRESSURE: 96 MMHG | DIASTOLIC BLOOD PRESSURE: 73 MMHG

## 2023-07-11 VITALS — SYSTOLIC BLOOD PRESSURE: 99 MMHG | DIASTOLIC BLOOD PRESSURE: 63 MMHG

## 2023-07-11 VITALS — SYSTOLIC BLOOD PRESSURE: 110 MMHG | DIASTOLIC BLOOD PRESSURE: 71 MMHG

## 2023-07-11 VITALS — DIASTOLIC BLOOD PRESSURE: 64 MMHG | SYSTOLIC BLOOD PRESSURE: 106 MMHG

## 2023-07-11 VITALS — DIASTOLIC BLOOD PRESSURE: 68 MMHG | SYSTOLIC BLOOD PRESSURE: 114 MMHG

## 2023-07-11 VITALS — DIASTOLIC BLOOD PRESSURE: 42 MMHG | SYSTOLIC BLOOD PRESSURE: 86 MMHG

## 2023-07-11 VITALS — DIASTOLIC BLOOD PRESSURE: 47 MMHG | SYSTOLIC BLOOD PRESSURE: 87 MMHG

## 2023-07-11 VITALS — SYSTOLIC BLOOD PRESSURE: 103 MMHG | DIASTOLIC BLOOD PRESSURE: 58 MMHG

## 2023-07-11 VITALS — DIASTOLIC BLOOD PRESSURE: 54 MMHG | SYSTOLIC BLOOD PRESSURE: 86 MMHG

## 2023-07-11 VITALS — SYSTOLIC BLOOD PRESSURE: 108 MMHG | DIASTOLIC BLOOD PRESSURE: 72 MMHG

## 2023-07-11 VITALS — SYSTOLIC BLOOD PRESSURE: 96 MMHG | DIASTOLIC BLOOD PRESSURE: 61 MMHG

## 2023-07-11 VITALS — SYSTOLIC BLOOD PRESSURE: 83 MMHG | DIASTOLIC BLOOD PRESSURE: 56 MMHG

## 2023-07-11 VITALS — SYSTOLIC BLOOD PRESSURE: 106 MMHG | DIASTOLIC BLOOD PRESSURE: 66 MMHG

## 2023-07-11 VITALS — DIASTOLIC BLOOD PRESSURE: 50 MMHG | SYSTOLIC BLOOD PRESSURE: 90 MMHG

## 2023-07-11 VITALS — DIASTOLIC BLOOD PRESSURE: 63 MMHG | SYSTOLIC BLOOD PRESSURE: 103 MMHG

## 2023-07-11 VITALS — DIASTOLIC BLOOD PRESSURE: 46 MMHG | SYSTOLIC BLOOD PRESSURE: 70 MMHG

## 2023-07-11 VITALS — DIASTOLIC BLOOD PRESSURE: 59 MMHG | SYSTOLIC BLOOD PRESSURE: 104 MMHG

## 2023-07-11 VITALS — SYSTOLIC BLOOD PRESSURE: 113 MMHG | DIASTOLIC BLOOD PRESSURE: 67 MMHG

## 2023-07-11 VITALS — SYSTOLIC BLOOD PRESSURE: 93 MMHG | DIASTOLIC BLOOD PRESSURE: 56 MMHG

## 2023-07-11 VITALS — DIASTOLIC BLOOD PRESSURE: 44 MMHG | SYSTOLIC BLOOD PRESSURE: 78 MMHG

## 2023-07-11 VITALS — SYSTOLIC BLOOD PRESSURE: 111 MMHG | DIASTOLIC BLOOD PRESSURE: 71 MMHG

## 2023-07-11 VITALS — DIASTOLIC BLOOD PRESSURE: 66 MMHG | SYSTOLIC BLOOD PRESSURE: 103 MMHG

## 2023-07-11 VITALS — SYSTOLIC BLOOD PRESSURE: 112 MMHG | DIASTOLIC BLOOD PRESSURE: 70 MMHG

## 2023-07-11 VITALS — SYSTOLIC BLOOD PRESSURE: 113 MMHG | DIASTOLIC BLOOD PRESSURE: 64 MMHG

## 2023-07-11 VITALS — SYSTOLIC BLOOD PRESSURE: 117 MMHG | DIASTOLIC BLOOD PRESSURE: 66 MMHG

## 2023-07-11 VITALS — SYSTOLIC BLOOD PRESSURE: 94 MMHG | DIASTOLIC BLOOD PRESSURE: 62 MMHG

## 2023-07-11 VITALS — SYSTOLIC BLOOD PRESSURE: 82 MMHG | DIASTOLIC BLOOD PRESSURE: 48 MMHG

## 2023-07-11 VITALS — DIASTOLIC BLOOD PRESSURE: 40 MMHG | SYSTOLIC BLOOD PRESSURE: 67 MMHG

## 2023-07-11 VITALS — SYSTOLIC BLOOD PRESSURE: 82 MMHG | DIASTOLIC BLOOD PRESSURE: 52 MMHG

## 2023-07-11 VITALS — SYSTOLIC BLOOD PRESSURE: 82 MMHG | DIASTOLIC BLOOD PRESSURE: 51 MMHG

## 2023-07-11 VITALS — SYSTOLIC BLOOD PRESSURE: 94 MMHG | DIASTOLIC BLOOD PRESSURE: 51 MMHG

## 2023-07-11 VITALS — SYSTOLIC BLOOD PRESSURE: 89 MMHG | DIASTOLIC BLOOD PRESSURE: 55 MMHG

## 2023-07-11 VITALS — SYSTOLIC BLOOD PRESSURE: 93 MMHG | DIASTOLIC BLOOD PRESSURE: 59 MMHG

## 2023-07-11 VITALS — DIASTOLIC BLOOD PRESSURE: 65 MMHG | SYSTOLIC BLOOD PRESSURE: 109 MMHG

## 2023-07-11 VITALS — DIASTOLIC BLOOD PRESSURE: 65 MMHG | SYSTOLIC BLOOD PRESSURE: 100 MMHG

## 2023-07-11 VITALS — SYSTOLIC BLOOD PRESSURE: 136 MMHG | DIASTOLIC BLOOD PRESSURE: 46 MMHG

## 2023-07-11 VITALS — DIASTOLIC BLOOD PRESSURE: 57 MMHG | SYSTOLIC BLOOD PRESSURE: 94 MMHG

## 2023-07-11 VITALS — DIASTOLIC BLOOD PRESSURE: 56 MMHG | SYSTOLIC BLOOD PRESSURE: 102 MMHG

## 2023-07-11 VITALS — DIASTOLIC BLOOD PRESSURE: 63 MMHG | SYSTOLIC BLOOD PRESSURE: 111 MMHG

## 2023-07-11 VITALS — DIASTOLIC BLOOD PRESSURE: 46 MMHG | SYSTOLIC BLOOD PRESSURE: 79 MMHG

## 2023-07-11 VITALS — DIASTOLIC BLOOD PRESSURE: 62 MMHG | SYSTOLIC BLOOD PRESSURE: 104 MMHG

## 2023-07-11 VITALS — DIASTOLIC BLOOD PRESSURE: 58 MMHG | SYSTOLIC BLOOD PRESSURE: 100 MMHG

## 2023-07-11 VITALS — SYSTOLIC BLOOD PRESSURE: 110 MMHG | DIASTOLIC BLOOD PRESSURE: 67 MMHG

## 2023-07-11 VITALS — DIASTOLIC BLOOD PRESSURE: 69 MMHG | SYSTOLIC BLOOD PRESSURE: 106 MMHG

## 2023-07-11 VITALS — SYSTOLIC BLOOD PRESSURE: 100 MMHG | DIASTOLIC BLOOD PRESSURE: 65 MMHG

## 2023-07-11 VITALS — DIASTOLIC BLOOD PRESSURE: 65 MMHG | SYSTOLIC BLOOD PRESSURE: 111 MMHG

## 2023-07-11 VITALS — SYSTOLIC BLOOD PRESSURE: 113 MMHG | DIASTOLIC BLOOD PRESSURE: 79 MMHG

## 2023-07-11 VITALS — DIASTOLIC BLOOD PRESSURE: 67 MMHG | SYSTOLIC BLOOD PRESSURE: 106 MMHG

## 2023-07-11 VITALS — DIASTOLIC BLOOD PRESSURE: 57 MMHG | SYSTOLIC BLOOD PRESSURE: 87 MMHG

## 2023-07-11 VITALS — DIASTOLIC BLOOD PRESSURE: 52 MMHG | SYSTOLIC BLOOD PRESSURE: 95 MMHG

## 2023-07-11 VITALS — SYSTOLIC BLOOD PRESSURE: 120 MMHG | DIASTOLIC BLOOD PRESSURE: 71 MMHG

## 2023-07-11 VITALS — DIASTOLIC BLOOD PRESSURE: 77 MMHG | SYSTOLIC BLOOD PRESSURE: 120 MMHG

## 2023-07-11 VITALS — DIASTOLIC BLOOD PRESSURE: 62 MMHG | SYSTOLIC BLOOD PRESSURE: 98 MMHG

## 2023-07-11 VITALS — SYSTOLIC BLOOD PRESSURE: 115 MMHG | DIASTOLIC BLOOD PRESSURE: 70 MMHG

## 2023-07-11 VITALS — DIASTOLIC BLOOD PRESSURE: 56 MMHG | SYSTOLIC BLOOD PRESSURE: 97 MMHG

## 2023-07-11 VITALS — SYSTOLIC BLOOD PRESSURE: 79 MMHG | DIASTOLIC BLOOD PRESSURE: 48 MMHG

## 2023-07-11 VITALS — DIASTOLIC BLOOD PRESSURE: 44 MMHG | SYSTOLIC BLOOD PRESSURE: 79 MMHG

## 2023-07-11 VITALS — DIASTOLIC BLOOD PRESSURE: 53 MMHG | SYSTOLIC BLOOD PRESSURE: 85 MMHG

## 2023-07-11 VITALS — SYSTOLIC BLOOD PRESSURE: 111 MMHG | DIASTOLIC BLOOD PRESSURE: 68 MMHG

## 2023-07-11 VITALS — DIASTOLIC BLOOD PRESSURE: 118 MMHG | SYSTOLIC BLOOD PRESSURE: 139 MMHG

## 2023-07-11 VITALS — DIASTOLIC BLOOD PRESSURE: 64 MMHG | SYSTOLIC BLOOD PRESSURE: 105 MMHG

## 2023-07-11 VITALS — SYSTOLIC BLOOD PRESSURE: 102 MMHG | DIASTOLIC BLOOD PRESSURE: 61 MMHG

## 2023-07-11 VITALS — DIASTOLIC BLOOD PRESSURE: 65 MMHG | SYSTOLIC BLOOD PRESSURE: 116 MMHG

## 2023-07-11 VITALS — DIASTOLIC BLOOD PRESSURE: 63 MMHG | SYSTOLIC BLOOD PRESSURE: 101 MMHG

## 2023-07-11 VITALS — DIASTOLIC BLOOD PRESSURE: 60 MMHG | SYSTOLIC BLOOD PRESSURE: 101 MMHG

## 2023-07-11 VITALS — DIASTOLIC BLOOD PRESSURE: 64 MMHG | SYSTOLIC BLOOD PRESSURE: 119 MMHG

## 2023-07-11 VITALS — SYSTOLIC BLOOD PRESSURE: 109 MMHG | DIASTOLIC BLOOD PRESSURE: 54 MMHG

## 2023-07-11 VITALS — SYSTOLIC BLOOD PRESSURE: 109 MMHG | DIASTOLIC BLOOD PRESSURE: 68 MMHG

## 2023-07-11 VITALS — DIASTOLIC BLOOD PRESSURE: 41 MMHG | SYSTOLIC BLOOD PRESSURE: 86 MMHG

## 2023-07-11 VITALS — DIASTOLIC BLOOD PRESSURE: 70 MMHG | SYSTOLIC BLOOD PRESSURE: 113 MMHG

## 2023-07-11 VITALS — SYSTOLIC BLOOD PRESSURE: 80 MMHG | DIASTOLIC BLOOD PRESSURE: 54 MMHG

## 2023-07-11 VITALS — DIASTOLIC BLOOD PRESSURE: 60 MMHG | SYSTOLIC BLOOD PRESSURE: 99 MMHG

## 2023-07-11 VITALS — DIASTOLIC BLOOD PRESSURE: 53 MMHG | SYSTOLIC BLOOD PRESSURE: 96 MMHG

## 2023-07-11 VITALS — DIASTOLIC BLOOD PRESSURE: 56 MMHG | SYSTOLIC BLOOD PRESSURE: 83 MMHG

## 2023-07-11 VITALS — DIASTOLIC BLOOD PRESSURE: 51 MMHG | SYSTOLIC BLOOD PRESSURE: 91 MMHG

## 2023-07-11 VITALS — DIASTOLIC BLOOD PRESSURE: 54 MMHG | SYSTOLIC BLOOD PRESSURE: 80 MMHG

## 2023-07-11 VITALS — SYSTOLIC BLOOD PRESSURE: 106 MMHG | DIASTOLIC BLOOD PRESSURE: 63 MMHG

## 2023-07-11 VITALS — DIASTOLIC BLOOD PRESSURE: 54 MMHG | SYSTOLIC BLOOD PRESSURE: 87 MMHG

## 2023-07-11 VITALS — SYSTOLIC BLOOD PRESSURE: 106 MMHG | DIASTOLIC BLOOD PRESSURE: 65 MMHG

## 2023-07-11 VITALS — DIASTOLIC BLOOD PRESSURE: 77 MMHG | SYSTOLIC BLOOD PRESSURE: 103 MMHG

## 2023-07-11 VITALS — SYSTOLIC BLOOD PRESSURE: 93 MMHG | DIASTOLIC BLOOD PRESSURE: 50 MMHG

## 2023-07-11 VITALS — DIASTOLIC BLOOD PRESSURE: 55 MMHG | SYSTOLIC BLOOD PRESSURE: 97 MMHG

## 2023-07-11 VITALS — DIASTOLIC BLOOD PRESSURE: 66 MMHG | SYSTOLIC BLOOD PRESSURE: 105 MMHG

## 2023-07-11 VITALS — SYSTOLIC BLOOD PRESSURE: 101 MMHG | DIASTOLIC BLOOD PRESSURE: 58 MMHG

## 2023-07-11 VITALS — DIASTOLIC BLOOD PRESSURE: 65 MMHG | SYSTOLIC BLOOD PRESSURE: 102 MMHG

## 2023-07-11 VITALS — SYSTOLIC BLOOD PRESSURE: 80 MMHG | DIASTOLIC BLOOD PRESSURE: 48 MMHG

## 2023-07-11 VITALS — SYSTOLIC BLOOD PRESSURE: 82 MMHG | DIASTOLIC BLOOD PRESSURE: 47 MMHG

## 2023-07-11 VITALS — DIASTOLIC BLOOD PRESSURE: 31 MMHG | SYSTOLIC BLOOD PRESSURE: 92 MMHG

## 2023-07-11 VITALS — SYSTOLIC BLOOD PRESSURE: 74 MMHG | DIASTOLIC BLOOD PRESSURE: 53 MMHG

## 2023-07-11 VITALS — DIASTOLIC BLOOD PRESSURE: 71 MMHG | SYSTOLIC BLOOD PRESSURE: 105 MMHG

## 2023-07-11 VITALS — SYSTOLIC BLOOD PRESSURE: 107 MMHG | DIASTOLIC BLOOD PRESSURE: 71 MMHG

## 2023-07-11 VITALS — DIASTOLIC BLOOD PRESSURE: 62 MMHG | SYSTOLIC BLOOD PRESSURE: 89 MMHG

## 2023-07-11 VITALS — SYSTOLIC BLOOD PRESSURE: 102 MMHG | DIASTOLIC BLOOD PRESSURE: 59 MMHG

## 2023-07-11 VITALS — DIASTOLIC BLOOD PRESSURE: 70 MMHG | SYSTOLIC BLOOD PRESSURE: 102 MMHG

## 2023-07-11 VITALS — DIASTOLIC BLOOD PRESSURE: 59 MMHG | SYSTOLIC BLOOD PRESSURE: 103 MMHG

## 2023-07-11 VITALS — SYSTOLIC BLOOD PRESSURE: 95 MMHG | DIASTOLIC BLOOD PRESSURE: 63 MMHG

## 2023-07-11 VITALS — DIASTOLIC BLOOD PRESSURE: 68 MMHG | SYSTOLIC BLOOD PRESSURE: 104 MMHG

## 2023-07-11 VITALS — SYSTOLIC BLOOD PRESSURE: 119 MMHG | DIASTOLIC BLOOD PRESSURE: 88 MMHG

## 2023-07-11 VITALS — DIASTOLIC BLOOD PRESSURE: 68 MMHG | SYSTOLIC BLOOD PRESSURE: 107 MMHG

## 2023-07-11 VITALS — SYSTOLIC BLOOD PRESSURE: 94 MMHG | DIASTOLIC BLOOD PRESSURE: 69 MMHG

## 2023-07-11 VITALS — DIASTOLIC BLOOD PRESSURE: 64 MMHG | SYSTOLIC BLOOD PRESSURE: 108 MMHG

## 2023-07-11 VITALS — DIASTOLIC BLOOD PRESSURE: 59 MMHG | SYSTOLIC BLOOD PRESSURE: 90 MMHG

## 2023-07-11 VITALS — SYSTOLIC BLOOD PRESSURE: 105 MMHG | DIASTOLIC BLOOD PRESSURE: 60 MMHG

## 2023-07-11 VITALS — DIASTOLIC BLOOD PRESSURE: 82 MMHG | SYSTOLIC BLOOD PRESSURE: 105 MMHG

## 2023-07-11 VITALS — DIASTOLIC BLOOD PRESSURE: 64 MMHG | SYSTOLIC BLOOD PRESSURE: 112 MMHG

## 2023-07-11 VITALS — SYSTOLIC BLOOD PRESSURE: 97 MMHG | DIASTOLIC BLOOD PRESSURE: 60 MMHG

## 2023-07-11 VITALS — DIASTOLIC BLOOD PRESSURE: 66 MMHG | SYSTOLIC BLOOD PRESSURE: 109 MMHG

## 2023-07-11 VITALS — SYSTOLIC BLOOD PRESSURE: 115 MMHG | DIASTOLIC BLOOD PRESSURE: 66 MMHG

## 2023-07-11 VITALS — SYSTOLIC BLOOD PRESSURE: 99 MMHG | DIASTOLIC BLOOD PRESSURE: 60 MMHG

## 2023-07-11 VITALS — SYSTOLIC BLOOD PRESSURE: 110 MMHG | DIASTOLIC BLOOD PRESSURE: 54 MMHG

## 2023-07-11 VITALS — SYSTOLIC BLOOD PRESSURE: 55 MMHG | DIASTOLIC BLOOD PRESSURE: 37 MMHG

## 2023-07-11 VITALS — DIASTOLIC BLOOD PRESSURE: 73 MMHG | SYSTOLIC BLOOD PRESSURE: 113 MMHG

## 2023-07-11 VITALS — DIASTOLIC BLOOD PRESSURE: 61 MMHG | SYSTOLIC BLOOD PRESSURE: 85 MMHG

## 2023-07-11 LAB
ALBUMIN SERPL-MCNC: 3 G/DL (ref 3.2–5)
ALP SERPL-CCNC: 74 U/L (ref 38–126)
ANION GAP SERPL CALCULATED.3IONS-SCNC: 11 MMOL/L
ANION GAP SERPL CALCULATED.3IONS-SCNC: 15 MMOL/L
AST SERPL-CCNC: 37 U/L (ref 9–36)
BASOPHILS NFR BLD AUTO: 0.1 % (ref 0–3)
BILIRUB UR QL STRIP.AUTO: NEGATIVE
BUN SERPL-MCNC: 23 MG/DL (ref 8–23)
BUN SERPL-MCNC: 23 MG/DL (ref 8–23)
BUN/CREAT SERPL: 16
BUN/CREAT SERPL: 24
CHLORIDE SERPL-SCNC: 104 MMOL/L (ref 95–108)
CHLORIDE SERPL-SCNC: 109 MMOL/L (ref 95–108)
CO2 SERPL-SCNC: 22 MMOL/L (ref 22–30)
CO2 SERPL-SCNC: 25 MMOL/L (ref 22–30)
COLOR UR AUTO: YELLOW
CREAT SERPL-MCNC: 1 MG/DL (ref 0.5–1)
CREAT SERPL-MCNC: 1.4 MG/DL (ref 0.5–1)
EOSINOPHIL NFR BLD AUTO: 0 % (ref 0–8)
ERYTHROCYTE [DISTWIDTH] IN BLOOD BY AUTOMATED COUNT: 18.6 % (ref 11.5–15.5)
GLUCOSE UR STRIP.AUTO-MCNC: NEGATIVE MG/DL
HCT VFR BLD AUTO: 37.5 % (ref 37–47)
HGB BLD-MCNC: 11 G/DL (ref 12–16)
HGB UR QL STRIP.AUTO: NEGATIVE
IMM GRANULOCYTES NFR BLD: 0.5 % (ref 0–5)
KETONES UR STRIP.AUTO-MCNC: NEGATIVE MG/DL
LEUKOCYTE ESTERASE UR QL STRIP.AUTO: NEGATIVE
LYMPHOCYTES NFR BLD: 4 % (ref 15–41)
MAGNESIUM SERPL-MCNC: 1.3 MG/DL (ref 1.6–2.3)
MCH RBC QN AUTO: 23.7 PG  CALC (ref 26–32)
MCHC RBC AUTO-ENTMCNC: 29.3 G/DL CAL (ref 32–36)
MCV RBC AUTO: 80.8 FL  CALC (ref 80–100)
MONOCYTES NFR BLD AUTO: 6.8 % (ref 2–13)
NEUTROPHILS # BLD AUTO: 23.22 THOU/UL (ref 2–7.15)
NEUTROPHILS NFR BLD AUTO: 88.6 % (ref 42–76)
NITRITE UR QL STRIP.AUTO: NEGATIVE
PH UR STRIP.AUTO: 5.5 [PH] (ref 4.5–8)
PLATELET # BLD AUTO: 198 THOU/UL (ref 130–400)
POTASSIUM SERPL-SCNC: 3.5 MMOL/L (ref 3.5–5.1)
POTASSIUM SERPL-SCNC: 3.8 MMOL/L (ref 3.5–5.1)
PROT SERPL-MCNC: 6.3 G/DL (ref 6.3–8.2)
PROT UR QL STRIP.AUTO: (no result) MG/DL
RBC # BLD AUTO: 4.64 MILL/UL (ref 4.2–5.6)
SODIUM SERPL-SCNC: 137 MMOL/L (ref 137–146)
SODIUM SERPL-SCNC: 141 MMOL/L (ref 137–146)
SP GR UR STRIP.AUTO: 1.01
UROBILINOGEN UR QL STRIP.AUTO: 0.2 E.U./DL

## 2023-07-11 PROCEDURE — 3E043XZ INTRODUCTION OF VASOPRESSOR INTO CENTRAL VEIN, PERCUTANEOUS APPROACH: ICD-10-PCS | Performed by: INTERNAL MEDICINE

## 2023-07-11 PROCEDURE — 02HV33Z INSERTION OF INFUSION DEVICE INTO SUPERIOR VENA CAVA, PERCUTANEOUS APPROACH: ICD-10-PCS | Performed by: EMERGENCY MEDICINE

## 2023-07-11 PROCEDURE — 0T9B70Z DRAINAGE OF BLADDER WITH DRAINAGE DEVICE, VIA NATURAL OR ARTIFICIAL OPENING: ICD-10-PCS | Performed by: EMERGENCY MEDICINE

## 2023-07-12 VITALS — SYSTOLIC BLOOD PRESSURE: 100 MMHG | DIASTOLIC BLOOD PRESSURE: 57 MMHG

## 2023-07-12 VITALS — DIASTOLIC BLOOD PRESSURE: 83 MMHG | SYSTOLIC BLOOD PRESSURE: 112 MMHG

## 2023-07-12 VITALS — DIASTOLIC BLOOD PRESSURE: 62 MMHG | SYSTOLIC BLOOD PRESSURE: 101 MMHG

## 2023-07-12 VITALS — SYSTOLIC BLOOD PRESSURE: 104 MMHG | DIASTOLIC BLOOD PRESSURE: 46 MMHG

## 2023-07-12 VITALS — SYSTOLIC BLOOD PRESSURE: 84 MMHG | DIASTOLIC BLOOD PRESSURE: 45 MMHG

## 2023-07-12 VITALS — DIASTOLIC BLOOD PRESSURE: 58 MMHG | SYSTOLIC BLOOD PRESSURE: 91 MMHG

## 2023-07-12 VITALS — DIASTOLIC BLOOD PRESSURE: 68 MMHG | SYSTOLIC BLOOD PRESSURE: 110 MMHG

## 2023-07-12 VITALS — DIASTOLIC BLOOD PRESSURE: 64 MMHG | SYSTOLIC BLOOD PRESSURE: 111 MMHG

## 2023-07-12 VITALS — SYSTOLIC BLOOD PRESSURE: 113 MMHG | DIASTOLIC BLOOD PRESSURE: 71 MMHG

## 2023-07-12 VITALS — DIASTOLIC BLOOD PRESSURE: 65 MMHG | SYSTOLIC BLOOD PRESSURE: 113 MMHG

## 2023-07-12 VITALS — DIASTOLIC BLOOD PRESSURE: 64 MMHG | SYSTOLIC BLOOD PRESSURE: 109 MMHG

## 2023-07-12 VITALS — SYSTOLIC BLOOD PRESSURE: 95 MMHG | DIASTOLIC BLOOD PRESSURE: 51 MMHG

## 2023-07-12 VITALS — SYSTOLIC BLOOD PRESSURE: 95 MMHG | DIASTOLIC BLOOD PRESSURE: 43 MMHG

## 2023-07-12 VITALS — DIASTOLIC BLOOD PRESSURE: 55 MMHG | SYSTOLIC BLOOD PRESSURE: 105 MMHG

## 2023-07-12 VITALS — DIASTOLIC BLOOD PRESSURE: 46 MMHG | SYSTOLIC BLOOD PRESSURE: 94 MMHG

## 2023-07-12 VITALS — DIASTOLIC BLOOD PRESSURE: 65 MMHG | SYSTOLIC BLOOD PRESSURE: 107 MMHG

## 2023-07-12 VITALS — DIASTOLIC BLOOD PRESSURE: 65 MMHG | SYSTOLIC BLOOD PRESSURE: 114 MMHG

## 2023-07-12 VITALS — SYSTOLIC BLOOD PRESSURE: 105 MMHG | DIASTOLIC BLOOD PRESSURE: 53 MMHG

## 2023-07-12 VITALS — SYSTOLIC BLOOD PRESSURE: 85 MMHG | DIASTOLIC BLOOD PRESSURE: 50 MMHG

## 2023-07-12 VITALS — DIASTOLIC BLOOD PRESSURE: 61 MMHG | SYSTOLIC BLOOD PRESSURE: 102 MMHG

## 2023-07-12 VITALS — SYSTOLIC BLOOD PRESSURE: 102 MMHG | DIASTOLIC BLOOD PRESSURE: 60 MMHG

## 2023-07-12 VITALS — DIASTOLIC BLOOD PRESSURE: 59 MMHG | SYSTOLIC BLOOD PRESSURE: 94 MMHG

## 2023-07-12 VITALS — SYSTOLIC BLOOD PRESSURE: 89 MMHG | DIASTOLIC BLOOD PRESSURE: 58 MMHG

## 2023-07-12 VITALS — DIASTOLIC BLOOD PRESSURE: 57 MMHG | SYSTOLIC BLOOD PRESSURE: 104 MMHG

## 2023-07-12 VITALS — DIASTOLIC BLOOD PRESSURE: 54 MMHG | SYSTOLIC BLOOD PRESSURE: 94 MMHG

## 2023-07-12 VITALS — DIASTOLIC BLOOD PRESSURE: 62 MMHG | SYSTOLIC BLOOD PRESSURE: 97 MMHG

## 2023-07-12 VITALS — SYSTOLIC BLOOD PRESSURE: 87 MMHG | DIASTOLIC BLOOD PRESSURE: 44 MMHG

## 2023-07-12 VITALS — DIASTOLIC BLOOD PRESSURE: 53 MMHG | SYSTOLIC BLOOD PRESSURE: 97 MMHG

## 2023-07-12 VITALS — DIASTOLIC BLOOD PRESSURE: 53 MMHG | SYSTOLIC BLOOD PRESSURE: 99 MMHG

## 2023-07-12 VITALS — DIASTOLIC BLOOD PRESSURE: 48 MMHG | SYSTOLIC BLOOD PRESSURE: 93 MMHG

## 2023-07-12 VITALS — DIASTOLIC BLOOD PRESSURE: 57 MMHG | SYSTOLIC BLOOD PRESSURE: 107 MMHG

## 2023-07-12 VITALS — DIASTOLIC BLOOD PRESSURE: 54 MMHG | SYSTOLIC BLOOD PRESSURE: 100 MMHG

## 2023-07-12 VITALS — DIASTOLIC BLOOD PRESSURE: 60 MMHG | SYSTOLIC BLOOD PRESSURE: 108 MMHG

## 2023-07-12 VITALS — SYSTOLIC BLOOD PRESSURE: 96 MMHG | DIASTOLIC BLOOD PRESSURE: 56 MMHG

## 2023-07-12 VITALS — DIASTOLIC BLOOD PRESSURE: 46 MMHG | SYSTOLIC BLOOD PRESSURE: 109 MMHG

## 2023-07-12 VITALS — SYSTOLIC BLOOD PRESSURE: 122 MMHG | DIASTOLIC BLOOD PRESSURE: 75 MMHG

## 2023-07-12 VITALS — SYSTOLIC BLOOD PRESSURE: 109 MMHG | DIASTOLIC BLOOD PRESSURE: 59 MMHG

## 2023-07-12 VITALS — DIASTOLIC BLOOD PRESSURE: 64 MMHG | SYSTOLIC BLOOD PRESSURE: 106 MMHG

## 2023-07-12 VITALS — SYSTOLIC BLOOD PRESSURE: 103 MMHG | DIASTOLIC BLOOD PRESSURE: 62 MMHG

## 2023-07-12 VITALS — SYSTOLIC BLOOD PRESSURE: 104 MMHG | DIASTOLIC BLOOD PRESSURE: 67 MMHG

## 2023-07-12 VITALS — DIASTOLIC BLOOD PRESSURE: 49 MMHG | SYSTOLIC BLOOD PRESSURE: 93 MMHG

## 2023-07-12 VITALS — SYSTOLIC BLOOD PRESSURE: 106 MMHG | DIASTOLIC BLOOD PRESSURE: 62 MMHG

## 2023-07-12 VITALS — DIASTOLIC BLOOD PRESSURE: 64 MMHG | SYSTOLIC BLOOD PRESSURE: 112 MMHG

## 2023-07-12 VITALS — SYSTOLIC BLOOD PRESSURE: 96 MMHG | DIASTOLIC BLOOD PRESSURE: 59 MMHG

## 2023-07-12 VITALS — DIASTOLIC BLOOD PRESSURE: 64 MMHG | SYSTOLIC BLOOD PRESSURE: 108 MMHG

## 2023-07-12 VITALS — DIASTOLIC BLOOD PRESSURE: 66 MMHG | SYSTOLIC BLOOD PRESSURE: 113 MMHG

## 2023-07-12 VITALS — DIASTOLIC BLOOD PRESSURE: 60 MMHG | SYSTOLIC BLOOD PRESSURE: 107 MMHG

## 2023-07-12 VITALS — SYSTOLIC BLOOD PRESSURE: 82 MMHG | DIASTOLIC BLOOD PRESSURE: 44 MMHG

## 2023-07-12 VITALS — SYSTOLIC BLOOD PRESSURE: 98 MMHG | DIASTOLIC BLOOD PRESSURE: 58 MMHG

## 2023-07-12 VITALS — DIASTOLIC BLOOD PRESSURE: 51 MMHG | SYSTOLIC BLOOD PRESSURE: 103 MMHG

## 2023-07-12 VITALS — DIASTOLIC BLOOD PRESSURE: 66 MMHG | SYSTOLIC BLOOD PRESSURE: 100 MMHG

## 2023-07-12 VITALS — DIASTOLIC BLOOD PRESSURE: 56 MMHG | SYSTOLIC BLOOD PRESSURE: 102 MMHG

## 2023-07-12 VITALS — SYSTOLIC BLOOD PRESSURE: 116 MMHG | DIASTOLIC BLOOD PRESSURE: 68 MMHG

## 2023-07-12 VITALS — DIASTOLIC BLOOD PRESSURE: 61 MMHG | SYSTOLIC BLOOD PRESSURE: 106 MMHG

## 2023-07-12 VITALS — SYSTOLIC BLOOD PRESSURE: 93 MMHG | DIASTOLIC BLOOD PRESSURE: 45 MMHG

## 2023-07-12 VITALS — DIASTOLIC BLOOD PRESSURE: 49 MMHG | SYSTOLIC BLOOD PRESSURE: 102 MMHG

## 2023-07-12 VITALS — SYSTOLIC BLOOD PRESSURE: 94 MMHG | DIASTOLIC BLOOD PRESSURE: 67 MMHG

## 2023-07-12 VITALS — SYSTOLIC BLOOD PRESSURE: 115 MMHG | DIASTOLIC BLOOD PRESSURE: 64 MMHG

## 2023-07-12 VITALS — DIASTOLIC BLOOD PRESSURE: 46 MMHG | SYSTOLIC BLOOD PRESSURE: 86 MMHG

## 2023-07-12 VITALS — DIASTOLIC BLOOD PRESSURE: 62 MMHG | SYSTOLIC BLOOD PRESSURE: 114 MMHG

## 2023-07-12 VITALS — SYSTOLIC BLOOD PRESSURE: 111 MMHG | DIASTOLIC BLOOD PRESSURE: 69 MMHG

## 2023-07-12 VITALS — DIASTOLIC BLOOD PRESSURE: 66 MMHG | SYSTOLIC BLOOD PRESSURE: 110 MMHG

## 2023-07-12 VITALS — DIASTOLIC BLOOD PRESSURE: 48 MMHG | SYSTOLIC BLOOD PRESSURE: 96 MMHG

## 2023-07-12 VITALS — DIASTOLIC BLOOD PRESSURE: 66 MMHG | SYSTOLIC BLOOD PRESSURE: 127 MMHG

## 2023-07-12 VITALS — DIASTOLIC BLOOD PRESSURE: 62 MMHG | SYSTOLIC BLOOD PRESSURE: 106 MMHG

## 2023-07-12 VITALS — SYSTOLIC BLOOD PRESSURE: 120 MMHG | DIASTOLIC BLOOD PRESSURE: 54 MMHG

## 2023-07-12 VITALS — SYSTOLIC BLOOD PRESSURE: 98 MMHG | DIASTOLIC BLOOD PRESSURE: 56 MMHG

## 2023-07-12 VITALS — SYSTOLIC BLOOD PRESSURE: 89 MMHG | DIASTOLIC BLOOD PRESSURE: 47 MMHG

## 2023-07-12 VITALS — DIASTOLIC BLOOD PRESSURE: 69 MMHG | SYSTOLIC BLOOD PRESSURE: 117 MMHG

## 2023-07-12 VITALS — SYSTOLIC BLOOD PRESSURE: 113 MMHG | DIASTOLIC BLOOD PRESSURE: 67 MMHG

## 2023-07-12 VITALS — SYSTOLIC BLOOD PRESSURE: 116 MMHG | DIASTOLIC BLOOD PRESSURE: 77 MMHG

## 2023-07-12 VITALS — DIASTOLIC BLOOD PRESSURE: 50 MMHG | SYSTOLIC BLOOD PRESSURE: 97 MMHG

## 2023-07-12 VITALS — DIASTOLIC BLOOD PRESSURE: 63 MMHG | SYSTOLIC BLOOD PRESSURE: 102 MMHG

## 2023-07-12 VITALS — SYSTOLIC BLOOD PRESSURE: 113 MMHG | DIASTOLIC BLOOD PRESSURE: 48 MMHG

## 2023-07-12 VITALS — DIASTOLIC BLOOD PRESSURE: 94 MMHG | SYSTOLIC BLOOD PRESSURE: 116 MMHG

## 2023-07-12 VITALS — DIASTOLIC BLOOD PRESSURE: 49 MMHG | SYSTOLIC BLOOD PRESSURE: 87 MMHG

## 2023-07-12 VITALS — DIASTOLIC BLOOD PRESSURE: 58 MMHG | SYSTOLIC BLOOD PRESSURE: 109 MMHG

## 2023-07-12 VITALS — DIASTOLIC BLOOD PRESSURE: 58 MMHG | SYSTOLIC BLOOD PRESSURE: 98 MMHG

## 2023-07-12 VITALS — SYSTOLIC BLOOD PRESSURE: 116 MMHG | DIASTOLIC BLOOD PRESSURE: 67 MMHG

## 2023-07-12 VITALS — SYSTOLIC BLOOD PRESSURE: 92 MMHG | DIASTOLIC BLOOD PRESSURE: 60 MMHG

## 2023-07-12 VITALS — SYSTOLIC BLOOD PRESSURE: 104 MMHG | DIASTOLIC BLOOD PRESSURE: 65 MMHG

## 2023-07-12 VITALS — DIASTOLIC BLOOD PRESSURE: 65 MMHG | SYSTOLIC BLOOD PRESSURE: 110 MMHG

## 2023-07-12 VITALS — DIASTOLIC BLOOD PRESSURE: 53 MMHG | SYSTOLIC BLOOD PRESSURE: 94 MMHG

## 2023-07-12 VITALS — SYSTOLIC BLOOD PRESSURE: 92 MMHG | DIASTOLIC BLOOD PRESSURE: 53 MMHG

## 2023-07-12 VITALS — SYSTOLIC BLOOD PRESSURE: 110 MMHG | DIASTOLIC BLOOD PRESSURE: 57 MMHG

## 2023-07-12 VITALS — SYSTOLIC BLOOD PRESSURE: 107 MMHG | DIASTOLIC BLOOD PRESSURE: 59 MMHG

## 2023-07-12 VITALS — SYSTOLIC BLOOD PRESSURE: 79 MMHG | DIASTOLIC BLOOD PRESSURE: 47 MMHG

## 2023-07-12 VITALS — DIASTOLIC BLOOD PRESSURE: 51 MMHG | SYSTOLIC BLOOD PRESSURE: 98 MMHG

## 2023-07-12 VITALS — DIASTOLIC BLOOD PRESSURE: 63 MMHG | SYSTOLIC BLOOD PRESSURE: 112 MMHG

## 2023-07-12 VITALS — SYSTOLIC BLOOD PRESSURE: 110 MMHG | DIASTOLIC BLOOD PRESSURE: 63 MMHG

## 2023-07-12 VITALS — SYSTOLIC BLOOD PRESSURE: 103 MMHG | DIASTOLIC BLOOD PRESSURE: 41 MMHG

## 2023-07-12 VITALS — DIASTOLIC BLOOD PRESSURE: 60 MMHG | SYSTOLIC BLOOD PRESSURE: 102 MMHG

## 2023-07-12 VITALS — DIASTOLIC BLOOD PRESSURE: 63 MMHG | SYSTOLIC BLOOD PRESSURE: 115 MMHG

## 2023-07-12 VITALS — SYSTOLIC BLOOD PRESSURE: 85 MMHG | DIASTOLIC BLOOD PRESSURE: 47 MMHG

## 2023-07-12 VITALS — DIASTOLIC BLOOD PRESSURE: 67 MMHG | SYSTOLIC BLOOD PRESSURE: 116 MMHG

## 2023-07-12 VITALS — DIASTOLIC BLOOD PRESSURE: 60 MMHG | SYSTOLIC BLOOD PRESSURE: 100 MMHG

## 2023-07-12 LAB
ALBUMIN SERPL-MCNC: 2 G/DL (ref 3.2–5)
ALP SERPL-CCNC: 93 U/L (ref 38–126)
ANION GAP SERPL CALCULATED.3IONS-SCNC: 4 MMOL/L
AST SERPL-CCNC: 19 U/L (ref 9–36)
BUN SERPL-MCNC: 21 MG/DL (ref 8–23)
BUN/CREAT SERPL: 25
CHLORIDE SERPL-SCNC: 109 MMOL/L (ref 95–108)
CO2 SERPL-SCNC: 30 MMOL/L (ref 22–30)
CREAT SERPL-MCNC: 0.8 MG/DL (ref 0.5–1)
ERYTHROCYTE [DISTWIDTH] IN BLOOD BY AUTOMATED COUNT: 18.6 % (ref 11.5–15.5)
HCT VFR BLD AUTO: 31 % (ref 37–47)
HGB BLD-MCNC: 9.3 G/DL (ref 12–16)
MAGNESIUM SERPL-MCNC: 1.4 MG/DL (ref 1.6–2.3)
MCH RBC QN AUTO: 24.3 PG  CALC (ref 26–32)
MCHC RBC AUTO-ENTMCNC: 30 G/DL CAL (ref 32–36)
MCV RBC AUTO: 81.2 FL  CALC (ref 80–100)
PLATELET # BLD AUTO: 150 THOU/UL (ref 130–400)
POTASSIUM SERPL-SCNC: 3.2 MMOL/L (ref 3.5–5.1)
PROT SERPL-MCNC: 4.5 G/DL (ref 6.3–8.2)
RBC # BLD AUTO: 3.82 MILL/UL (ref 4.2–5.6)
SODIUM SERPL-SCNC: 139 MMOL/L (ref 137–146)

## 2023-07-13 VITALS — SYSTOLIC BLOOD PRESSURE: 104 MMHG | DIASTOLIC BLOOD PRESSURE: 56 MMHG

## 2023-07-13 VITALS — SYSTOLIC BLOOD PRESSURE: 97 MMHG | DIASTOLIC BLOOD PRESSURE: 56 MMHG

## 2023-07-13 VITALS — SYSTOLIC BLOOD PRESSURE: 93 MMHG | DIASTOLIC BLOOD PRESSURE: 56 MMHG

## 2023-07-13 VITALS — SYSTOLIC BLOOD PRESSURE: 110 MMHG | DIASTOLIC BLOOD PRESSURE: 55 MMHG

## 2023-07-13 VITALS — DIASTOLIC BLOOD PRESSURE: 53 MMHG | SYSTOLIC BLOOD PRESSURE: 96 MMHG

## 2023-07-13 VITALS — SYSTOLIC BLOOD PRESSURE: 107 MMHG | DIASTOLIC BLOOD PRESSURE: 64 MMHG

## 2023-07-13 VITALS — SYSTOLIC BLOOD PRESSURE: 120 MMHG | DIASTOLIC BLOOD PRESSURE: 68 MMHG

## 2023-07-13 VITALS — DIASTOLIC BLOOD PRESSURE: 56 MMHG | SYSTOLIC BLOOD PRESSURE: 98 MMHG

## 2023-07-13 VITALS — DIASTOLIC BLOOD PRESSURE: 62 MMHG | SYSTOLIC BLOOD PRESSURE: 102 MMHG

## 2023-07-13 VITALS — DIASTOLIC BLOOD PRESSURE: 63 MMHG | SYSTOLIC BLOOD PRESSURE: 105 MMHG

## 2023-07-13 VITALS — DIASTOLIC BLOOD PRESSURE: 51 MMHG | SYSTOLIC BLOOD PRESSURE: 86 MMHG

## 2023-07-13 VITALS — SYSTOLIC BLOOD PRESSURE: 108 MMHG | DIASTOLIC BLOOD PRESSURE: 54 MMHG

## 2023-07-13 VITALS — DIASTOLIC BLOOD PRESSURE: 49 MMHG | SYSTOLIC BLOOD PRESSURE: 87 MMHG

## 2023-07-13 VITALS — DIASTOLIC BLOOD PRESSURE: 54 MMHG | SYSTOLIC BLOOD PRESSURE: 97 MMHG

## 2023-07-13 VITALS — DIASTOLIC BLOOD PRESSURE: 62 MMHG | SYSTOLIC BLOOD PRESSURE: 124 MMHG

## 2023-07-13 VITALS — DIASTOLIC BLOOD PRESSURE: 52 MMHG | SYSTOLIC BLOOD PRESSURE: 104 MMHG

## 2023-07-13 VITALS — DIASTOLIC BLOOD PRESSURE: 74 MMHG | SYSTOLIC BLOOD PRESSURE: 115 MMHG

## 2023-07-13 LAB
ALBUMIN SERPL-MCNC: 2 G/DL (ref 3.2–5)
ALP SERPL-CCNC: 87 U/L (ref 38–126)
ANION GAP SERPL CALCULATED.3IONS-SCNC: 5 MMOL/L
AST SERPL-CCNC: 15 U/L (ref 9–36)
BUN SERPL-MCNC: 17 MG/DL (ref 8–23)
BUN/CREAT SERPL: 25
CHLORIDE SERPL-SCNC: 108 MMOL/L (ref 95–108)
CO2 SERPL-SCNC: 30 MMOL/L (ref 22–30)
CREAT SERPL-MCNC: 0.7 MG/DL (ref 0.5–1)
ERYTHROCYTE [DISTWIDTH] IN BLOOD BY AUTOMATED COUNT: 18.8 % (ref 11.5–15.5)
HCT VFR BLD AUTO: 29.9 % (ref 37–47)
HGB BLD-MCNC: 8.9 G/DL (ref 12–16)
MAGNESIUM SERPL-MCNC: 1.7 MG/DL (ref 1.6–2.3)
MCH RBC QN AUTO: 24.1 PG  CALC (ref 26–32)
MCHC RBC AUTO-ENTMCNC: 29.8 G/DL CAL (ref 32–36)
MCV RBC AUTO: 80.8 FL  CALC (ref 80–100)
PLATELET # BLD AUTO: 120 THOU/UL (ref 130–400)
POTASSIUM SERPL-SCNC: 3.3 MMOL/L (ref 3.5–5.1)
PROT SERPL-MCNC: 4.5 G/DL (ref 6.3–8.2)
RBC # BLD AUTO: 3.7 MILL/UL (ref 4.2–5.6)
SODIUM SERPL-SCNC: 141 MMOL/L (ref 137–146)

## 2023-07-14 VITALS — DIASTOLIC BLOOD PRESSURE: 70 MMHG | SYSTOLIC BLOOD PRESSURE: 132 MMHG

## 2023-07-14 VITALS — SYSTOLIC BLOOD PRESSURE: 132 MMHG | DIASTOLIC BLOOD PRESSURE: 70 MMHG

## 2023-07-14 VITALS — DIASTOLIC BLOOD PRESSURE: 63 MMHG | SYSTOLIC BLOOD PRESSURE: 105 MMHG

## 2023-07-14 VITALS — SYSTOLIC BLOOD PRESSURE: 109 MMHG | DIASTOLIC BLOOD PRESSURE: 61 MMHG

## 2023-07-14 LAB
ALBUMIN SERPL-MCNC: 2.2 G/DL (ref 3.2–5)
ALP SERPL-CCNC: 81 U/L (ref 38–126)
ANION GAP SERPL CALCULATED.3IONS-SCNC: 3 MMOL/L
AST SERPL-CCNC: 17 U/L (ref 9–36)
BUN SERPL-MCNC: 13 MG/DL (ref 8–23)
BUN/CREAT SERPL: 18
CHLORIDE SERPL-SCNC: 104 MMOL/L (ref 95–108)
CO2 SERPL-SCNC: 32 MMOL/L (ref 22–30)
CREAT SERPL-MCNC: 0.7 MG/DL (ref 0.5–1)
ERYTHROCYTE [DISTWIDTH] IN BLOOD BY AUTOMATED COUNT: 18.5 % (ref 11.5–15.5)
HCT VFR BLD AUTO: 28.3 % (ref 37–47)
HGB BLD-MCNC: 8.5 G/DL (ref 12–16)
MAGNESIUM SERPL-MCNC: 1.5 MG/DL (ref 1.6–2.3)
MCH RBC QN AUTO: 24.3 PG  CALC (ref 26–32)
MCHC RBC AUTO-ENTMCNC: 30 G/DL CAL (ref 32–36)
MCV RBC AUTO: 80.9 FL  CALC (ref 80–100)
PLATELET # BLD AUTO: 108 THOU/UL (ref 130–400)
POTASSIUM SERPL-SCNC: 3.6 MMOL/L (ref 3.5–5.1)
PROT SERPL-MCNC: 5.1 G/DL (ref 6.3–8.2)
RBC # BLD AUTO: 3.5 MILL/UL (ref 4.2–5.6)
SODIUM SERPL-SCNC: 135 MMOL/L (ref 137–146)

## 2023-07-17 ENCOUNTER — HOSPITAL ENCOUNTER (EMERGENCY)
Dept: HOSPITAL 82 - ED | Age: 73
Discharge: HOME | End: 2023-07-17
Payer: MEDICARE

## 2023-07-17 VITALS — DIASTOLIC BLOOD PRESSURE: 129 MMHG | SYSTOLIC BLOOD PRESSURE: 154 MMHG

## 2023-07-17 VITALS — DIASTOLIC BLOOD PRESSURE: 68 MMHG | SYSTOLIC BLOOD PRESSURE: 121 MMHG

## 2023-07-17 VITALS — SYSTOLIC BLOOD PRESSURE: 119 MMHG | DIASTOLIC BLOOD PRESSURE: 63 MMHG

## 2023-07-17 VITALS — DIASTOLIC BLOOD PRESSURE: 110 MMHG | SYSTOLIC BLOOD PRESSURE: 143 MMHG

## 2023-07-17 VITALS — DIASTOLIC BLOOD PRESSURE: 43 MMHG | SYSTOLIC BLOOD PRESSURE: 67 MMHG

## 2023-07-17 VITALS — DIASTOLIC BLOOD PRESSURE: 60 MMHG | SYSTOLIC BLOOD PRESSURE: 108 MMHG

## 2023-07-17 VITALS — SYSTOLIC BLOOD PRESSURE: 105 MMHG | DIASTOLIC BLOOD PRESSURE: 57 MMHG

## 2023-07-17 VITALS — DIASTOLIC BLOOD PRESSURE: 36 MMHG | SYSTOLIC BLOOD PRESSURE: 75 MMHG

## 2023-07-17 VITALS — DIASTOLIC BLOOD PRESSURE: 42 MMHG | SYSTOLIC BLOOD PRESSURE: 86 MMHG

## 2023-07-17 VITALS — BODY MASS INDEX: 33.1 KG/M2 | HEIGHT: 62 IN | WEIGHT: 179.9 LBS

## 2023-07-17 VITALS — DIASTOLIC BLOOD PRESSURE: 58 MMHG | SYSTOLIC BLOOD PRESSURE: 85 MMHG

## 2023-07-17 VITALS — SYSTOLIC BLOOD PRESSURE: 101 MMHG | DIASTOLIC BLOOD PRESSURE: 58 MMHG

## 2023-07-17 VITALS — DIASTOLIC BLOOD PRESSURE: 51 MMHG | SYSTOLIC BLOOD PRESSURE: 70 MMHG

## 2023-07-17 VITALS — SYSTOLIC BLOOD PRESSURE: 139 MMHG | DIASTOLIC BLOOD PRESSURE: 104 MMHG

## 2023-07-17 VITALS — SYSTOLIC BLOOD PRESSURE: 131 MMHG | DIASTOLIC BLOOD PRESSURE: 113 MMHG

## 2023-07-17 DIAGNOSIS — F32.A: ICD-10-CM

## 2023-07-17 DIAGNOSIS — J44.9: ICD-10-CM

## 2023-07-17 DIAGNOSIS — F41.9: ICD-10-CM

## 2023-07-17 DIAGNOSIS — Z87.11: ICD-10-CM

## 2023-07-17 DIAGNOSIS — I25.10: ICD-10-CM

## 2023-07-17 DIAGNOSIS — Z91.81: ICD-10-CM

## 2023-07-17 DIAGNOSIS — E66.01: ICD-10-CM

## 2023-07-17 DIAGNOSIS — Z98.84: ICD-10-CM

## 2023-07-17 DIAGNOSIS — I50.9: ICD-10-CM

## 2023-07-17 DIAGNOSIS — I11.0: ICD-10-CM
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
## 2023-07-17 DIAGNOSIS — I48.91: ICD-10-CM

## 2023-07-17 DIAGNOSIS — E03.9: ICD-10-CM

## 2023-07-17 DIAGNOSIS — Z99.81: ICD-10-CM

## 2023-07-17 DIAGNOSIS — G62.9: ICD-10-CM

## 2023-07-17 DIAGNOSIS — S32.040D: Primary | ICD-10-CM

## 2023-07-17 DIAGNOSIS — K21.9: ICD-10-CM

## 2025-02-04 NOTE — NUR
PT. SITTING UP IN BED WITH NO DISTRESS NOTED. ASSESSMENT COMPLETED. PICC LINE
TO ERIC INTACT AND INFUSING ORDERED IVF WELL. SCHED MEDS GIVEN AT THIS TIME.
PT'S OWN FENTANYL PATCH WAS FOUND ON LEFT BREAST AND DATED FOR 07/10/17, WILL
CALL MD TO ADDRESS PATCH. UPDATED WITH POC, VERBALIZES UNDERSTANDING. PT.
REFUSES ZEB HOSE TO BE PLACED. INSTRUCTED PT. TO CALL FOR ANY NEEDS AND NOT TO
GET OOB ALONE. BED ALARM PLACED FOR SAFETY PREC. WILL CONTINUE TO MONITOR. Attending and PA/NP shared services statement (NON-critical care):